# Patient Record
Sex: FEMALE | Race: WHITE | NOT HISPANIC OR LATINO | Employment: OTHER | URBAN - METROPOLITAN AREA
[De-identification: names, ages, dates, MRNs, and addresses within clinical notes are randomized per-mention and may not be internally consistent; named-entity substitution may affect disease eponyms.]

---

## 2017-01-19 ENCOUNTER — ALLSCRIPTS OFFICE VISIT (OUTPATIENT)
Dept: OTHER | Facility: OTHER | Age: 42
End: 2017-01-19

## 2017-01-19 DIAGNOSIS — Z12.31 ENCOUNTER FOR SCREENING MAMMOGRAM FOR MALIGNANT NEOPLASM OF BREAST: ICD-10-CM

## 2017-01-19 DIAGNOSIS — O24.419 GESTATIONAL DIABETES MELLITUS IN PREGNANCY: ICD-10-CM

## 2017-01-19 DIAGNOSIS — E66.9 OBESITY: ICD-10-CM

## 2017-01-24 ENCOUNTER — APPOINTMENT (OUTPATIENT)
Dept: LAB | Facility: HOSPITAL | Age: 42
End: 2017-01-24
Payer: COMMERCIAL

## 2017-01-24 ENCOUNTER — TRANSCRIBE ORDERS (OUTPATIENT)
Dept: LAB | Facility: HOSPITAL | Age: 42
End: 2017-01-24

## 2017-01-24 DIAGNOSIS — O24.419 GESTATIONAL DIABETES MELLITUS IN PREGNANCY: ICD-10-CM

## 2017-01-24 DIAGNOSIS — E66.9 OBESITY: ICD-10-CM

## 2017-01-24 LAB
ALBUMIN SERPL BCP-MCNC: 3.3 G/DL (ref 3.5–5)
ALP SERPL-CCNC: 72 U/L (ref 46–116)
ALT SERPL W P-5'-P-CCNC: 21 U/L (ref 12–78)
ANION GAP SERPL CALCULATED.3IONS-SCNC: 6 MMOL/L (ref 4–13)
AST SERPL W P-5'-P-CCNC: 25 U/L (ref 5–45)
BILIRUB SERPL-MCNC: 0.36 MG/DL (ref 0.2–1)
BUN SERPL-MCNC: 10 MG/DL (ref 5–25)
CALCIUM SERPL-MCNC: 9 MG/DL (ref 8.3–10.1)
CHLORIDE SERPL-SCNC: 105 MMOL/L (ref 100–108)
CHOLEST SERPL-MCNC: 208 MG/DL (ref 50–200)
CO2 SERPL-SCNC: 28 MMOL/L (ref 21–32)
CREAT SERPL-MCNC: 0.63 MG/DL (ref 0.6–1.3)
EST. AVERAGE GLUCOSE BLD GHB EST-MCNC: 103 MG/DL
GFR SERPL CREATININE-BSD FRML MDRD: >60 ML/MIN/1.73SQ M
GLUCOSE SERPL-MCNC: 85 MG/DL (ref 65–140)
HBA1C MFR BLD: 5.2 % (ref 4.2–6.3)
HDLC SERPL-MCNC: 54 MG/DL (ref 40–60)
LDLC SERPL CALC-MCNC: 138 MG/DL (ref 0–100)
POTASSIUM SERPL-SCNC: 4.5 MMOL/L (ref 3.5–5.3)
PROT SERPL-MCNC: 7.5 G/DL (ref 6.4–8.2)
SODIUM SERPL-SCNC: 139 MMOL/L (ref 136–145)
TRIGL SERPL-MCNC: 81 MG/DL

## 2017-01-24 PROCEDURE — 80053 COMPREHEN METABOLIC PANEL: CPT

## 2017-01-24 PROCEDURE — 36415 COLL VENOUS BLD VENIPUNCTURE: CPT

## 2017-01-24 PROCEDURE — 80061 LIPID PANEL: CPT

## 2017-01-24 PROCEDURE — 83036 HEMOGLOBIN GLYCOSYLATED A1C: CPT

## 2017-01-29 ENCOUNTER — HOSPITAL ENCOUNTER (EMERGENCY)
Facility: HOSPITAL | Age: 42
Discharge: HOME | End: 2017-01-29
Attending: EMERGENCY MEDICINE | Admitting: EMERGENCY MEDICINE
Payer: COMMERCIAL

## 2017-01-29 VITALS
OXYGEN SATURATION: 98 % | WEIGHT: 220 LBS | RESPIRATION RATE: 18 BRPM | TEMPERATURE: 97 F | DIASTOLIC BLOOD PRESSURE: 71 MMHG | SYSTOLIC BLOOD PRESSURE: 158 MMHG | BODY MASS INDEX: 38.98 KG/M2 | HEIGHT: 63 IN | HEART RATE: 97 BPM

## 2017-01-29 DIAGNOSIS — L29.8 PRURITIC ERYTHEMATOUS RASH: Primary | ICD-10-CM

## 2017-01-29 PROCEDURE — 99282 EMERGENCY DEPT VISIT SF MDM: CPT

## 2017-03-07 ENCOUNTER — HOSPITAL ENCOUNTER (OUTPATIENT)
Dept: RADIOLOGY | Facility: HOSPITAL | Age: 42
Discharge: HOME | End: 2017-03-07
Payer: COMMERCIAL

## 2017-03-07 DIAGNOSIS — Z12.31 ENCOUNTER FOR SCREENING MAMMOGRAM FOR MALIGNANT NEOPLASM OF BREAST: ICD-10-CM

## 2017-03-07 PROCEDURE — G0202 SCR MAMMO BI INCL CAD: HCPCS

## 2017-05-11 ENCOUNTER — LAB REQUISITION (OUTPATIENT)
Dept: LAB | Facility: HOSPITAL | Age: 42
End: 2017-05-11
Payer: COMMERCIAL

## 2017-05-11 ENCOUNTER — ALLSCRIPTS OFFICE VISIT (OUTPATIENT)
Dept: OTHER | Facility: OTHER | Age: 42
End: 2017-05-11

## 2017-05-11 DIAGNOSIS — N92.6 IRREGULAR MENSTRUATION: ICD-10-CM

## 2017-05-11 DIAGNOSIS — Z01.419 ENCOUNTER FOR GYNECOLOGICAL EXAMINATION WITHOUT ABNORMAL FINDING: ICD-10-CM

## 2017-05-11 PROCEDURE — G0143 SCR C/V CYTO,THINLAYER,RESCR: HCPCS | Performed by: PHYSICIAN ASSISTANT

## 2017-05-16 ENCOUNTER — ALLSCRIPTS OFFICE VISIT (OUTPATIENT)
Dept: OTHER | Facility: OTHER | Age: 42
End: 2017-05-16

## 2017-05-17 ENCOUNTER — ALLSCRIPTS OFFICE VISIT (OUTPATIENT)
Dept: OTHER | Facility: OTHER | Age: 42
End: 2017-05-17

## 2017-05-17 ENCOUNTER — LAB REQUISITION (OUTPATIENT)
Dept: LAB | Facility: HOSPITAL | Age: 42
End: 2017-05-17
Payer: COMMERCIAL

## 2017-05-17 DIAGNOSIS — N90.89 OTHER SPECIFIED NONINFLAMMATORY DISORDER OF VULVA AND PERINEUM: ICD-10-CM

## 2017-05-17 PROCEDURE — 88305 TISSUE EXAM BY PATHOLOGIST: CPT | Performed by: PHYSICIAN ASSISTANT

## 2017-05-18 LAB
LAB AP GYN PRIMARY INTERPRETATION: NORMAL
LAB AP LMP: NORMAL
Lab: NORMAL
PATH INTERP SPEC-IMP: NORMAL

## 2017-05-19 ENCOUNTER — GENERIC CONVERSION - ENCOUNTER (OUTPATIENT)
Dept: OTHER | Facility: OTHER | Age: 42
End: 2017-05-19

## 2017-05-25 ENCOUNTER — GENERIC CONVERSION - ENCOUNTER (OUTPATIENT)
Dept: OTHER | Facility: OTHER | Age: 42
End: 2017-05-25

## 2017-08-02 ENCOUNTER — ALLSCRIPTS OFFICE VISIT (OUTPATIENT)
Dept: OTHER | Facility: OTHER | Age: 42
End: 2017-08-02

## 2017-10-19 ENCOUNTER — GENERIC CONVERSION - ENCOUNTER (OUTPATIENT)
Dept: OTHER | Facility: OTHER | Age: 42
End: 2017-10-19

## 2017-10-19 DIAGNOSIS — M79.672 PAIN OF LEFT FOOT: ICD-10-CM

## 2017-10-23 ENCOUNTER — ALLSCRIPTS OFFICE VISIT (OUTPATIENT)
Dept: OTHER | Facility: OTHER | Age: 42
End: 2017-10-23

## 2017-10-24 NOTE — PROGRESS NOTES
Assessment  1  Breast cancer screening, high risk patient (V76 11) (Z12 31)    Plan  Breast cancer screening, high risk patient    · * MRI BREAST BILATERAL W OR WO CONTRAST; Status:Need Information - Financial  Authorization; Requested ALQ:[id number];    Perform:[facility] Radiology; GYW:[id number]; Ordered; For:Breast cancer screening, high risk patient; Ordered By:Celia Dempsey;   · Follow-up PRN Evaluation and Treatment  Follow-up  Status: Complete  Done:  [id number]   Ordered; For: Breast cancer screening, high risk patient; Ordered By: Ac Holly Performed:  Due: 16KSH1581   · Follow-up visit in 5 months Evaluation and Treatment  Follow-up  Status: Hold For -  Scheduling  Requested for: [id number]   Ordered; For: Breast cancer screening, high risk patient; Ordered By: Ac Holly Performed:  Due: 73IQK9083    Discussion/Summary  Discussion Summary:   Negative BRCA mutation result  Breast cancer risk score of 32 0%  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  GIven provided packet of information to complete and return to the office asap to be return to the [employer]  Discussed elevated risk associated with breast cancer risk score which is based on family, personal and breast cancer markers  Continue with annual mammogram and monthly BSE  Start annual breast MRI with recommendation of rotation of MRI then mammo 6 months later  Negative mutation result means a breast cancer will not be the result of this gene mutation  Breast cancer risk is no less than that of your personal and family history  Share result information with PCP  Counseling Documentation With Imm: The patient was counseled regarding instructions for management,-- risk factor reductions,-- patient and family education,-- risks and benefits of treatment options,-- importance of compliance with treatment  total time of encounter was 20 minutes-- and-- 18 minutes was spent counseling        Chief Complaint  Chief Complaint Free Text Note Form: Pt here for [facility] results      History of Present Illness  HPI: Patient here for BRCA results  Referred from [facility]  Review of Systems  Focused-Female:   Constitutional: No fever, no chills, feels well, no tiredness, no recent weight gain or loss  Breasts: no complaints of breast pain, breast lump or nipple discharge  ROS Reviewed:   ROS reviewed  Active Problems  1  Bacterial vaginosis (616 10,041 9) (N76 0,B96 89)   2  Bipolar disorder (296 80) (F31 9)   3  Encounter for routine gynecological examination (V72 31) (Z01 419)   4  Genetic testing (V82 79) (Z13 79)   5  Gestational diabetes (648 80) (O24 419)   6  Heel pain, chronic, left (729 5,338 29) (M79 672,G89 29)   7  Lichen sclerosus (202 9) (L90 0)   8  Need for influenza vaccination (V04 81) (Z23)   9  Need for Tdap vaccination (V06 1) (Z23)   10  Obesity (278 00) (E66 9)   11  Pap smear, as part of routine gynecological examination (V76 2) (Z01 419)   12  Screening for colon cancer (V76 51) (Z12 11)   13  Screening for colon cancer (V76 51) (Z12 11)   14  Visit for gynecologic examination (V72 31) (Z01 419)   15  Visit for screening mammogram (V76 12) (Z12 31)   16  Vulvar irritation (624 8) (N90 89)    Past Medical History  1  History of Age At First Period 15 Years Old (Menarche)   2  History of Ankle weakness (719 67) (M62 81)   3  History of Asthma (493 90) (J45 909)   4  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) (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 4)   5  History of Depression (311) (F32 9)   6  History of Diastasis recti (728 84) (M62 08)   7  History of Female pelvic pain (625 9) (R10 2)   8  History Of 5  Previous Pregnancies (V61 5)   9  History of anemia (V12 3) (Z86 2)   10  History of bipolar disorder (V11 1) (Z86 59)   11  History of chlamydia infection (V12 09) (Z86 19)   12  History of low back pain (V13 59) (Z87 39)   13  History of menstrual disorder (V13 29) (Z87 42)   14  History of migraine (V12 49) (Z86 69)   15   History of Polyuria (788 42) (R35 8)   16  History of Post traumatic stress disorder (309 81) (F43 10)  Active Problems And Past Medical History Reviewed: The active problems and past medical history were reviewed and updated today  Surgical History  1  History of Breast Surgery Lumpectomy   2  History of  Section   3  History of Cholecystectomy Laparoscopic   4  History of Laparoscopy With Excision Of Ectopic Pregnancy   5  History of Tubal Ligation  Surgical History Reviewed: The surgical history was reviewed and updated today  Family History  Mother    1  Family history of Current Diet Is High In Cholesterol   2  Family history of Diabetes Mellitus (V18 0)   3  Family history of Hyperlipidemia   4  Family history of Hypertension (V17 49)  Father    5  Family history of Alcoholism   6  Family history of Drug abuse   7  Family history of Incarceration  Sister    6  Family history of colon cancer (V16 0) (Z80 0)  Maternal Grandmother    9  Family history of Leukemia (V16 6)  Maternal Aunt    10  Family history of breast cancer (V16 3) (Z80 3)  Family History    11  Family history of Ectopic Pregnancy   12  Family history of Uterine Cancer (V16 49)  Family History Reviewed: The family history was reviewed and updated today  Social History   · Child of  household (V61 06) (Z63 8)   · Disability   · Denied: History of Drug Use   · Four children   · Never A Smoker   · Occasional alcohol use   · Single  Social History Reviewed: The social history was reviewed and updated today  Current Meds   1  Naproxen 500 MG Oral Tablet; TAKE 1 TABLET EVERY 12 HOURS WITH FOOD AS   NEEDED; Therapy: 10AIW7598 to (Evaluate:2017)  Requested for: 2017; Last   Rx:2017 Ordered    Allergies  1  No Known Drug Allergies  2  No Known Food Allergies   3   Pollen    Vitals  Vital Signs    Recorded: 74QZX0964 61:55EK   Systolic 411, RUE, Sitting   Diastolic 68, RUE, Sitting   Height 5 ft 4 5 in   Weight 232 lb    BMI Calculated 39 21   BSA Calculated 2 1   LMP 23Jun2017     Physical Exam  deferred        Signatures   Electronically signed by :  RIAZ Sherwood; Oct 23 2017 11:14AM EST                       (Author)    Electronically signed by : DOT Ma ; Oct 23 2017  7:14PM EST                       (Author)

## 2018-01-12 VITALS
HEIGHT: 65 IN | BODY MASS INDEX: 38.65 KG/M2 | WEIGHT: 232 LBS | DIASTOLIC BLOOD PRESSURE: 68 MMHG | SYSTOLIC BLOOD PRESSURE: 104 MMHG

## 2018-01-12 VITALS
BODY MASS INDEX: 37.15 KG/M2 | HEART RATE: 80 BPM | WEIGHT: 223 LBS | DIASTOLIC BLOOD PRESSURE: 72 MMHG | HEIGHT: 65 IN | TEMPERATURE: 98.6 F | SYSTOLIC BLOOD PRESSURE: 104 MMHG

## 2018-01-13 VITALS
SYSTOLIC BLOOD PRESSURE: 110 MMHG | BODY MASS INDEX: 36.82 KG/M2 | HEIGHT: 65 IN | WEIGHT: 221 LBS | DIASTOLIC BLOOD PRESSURE: 70 MMHG

## 2018-01-14 VITALS
WEIGHT: 225 LBS | HEIGHT: 65 IN | DIASTOLIC BLOOD PRESSURE: 64 MMHG | SYSTOLIC BLOOD PRESSURE: 108 MMHG | BODY MASS INDEX: 37.49 KG/M2

## 2018-01-14 VITALS
SYSTOLIC BLOOD PRESSURE: 120 MMHG | DIASTOLIC BLOOD PRESSURE: 76 MMHG | BODY MASS INDEX: 37.07 KG/M2 | WEIGHT: 222.5 LBS | HEIGHT: 65 IN

## 2018-01-15 NOTE — MISCELLANEOUS
Message  Spoke with patient regarding PAP results  Negative cytology, but showed BV infection  Patient complains of itching, but no discharge or odor  Rx for metronidazole 500 mg BID x 5 days sent to pharmacy  Instructed patient to avoid alcohol while on abx  We will call with results of vulvar biopsy        Signatures   Electronically signed by : Austen Babin, [facility]; May 19 2017 11:17AM EST                       (Author)

## 2018-01-15 NOTE — MISCELLANEOUS
Message  Spoke with patient regarding vulvar biopsy results  Pathology showed lichen sclerosus  Negative for lesion or malignancy  Rx for Topicort cream sent to pharmacy  Patient to use externally sparingly twice a day  Also discussed genetic testing  Explained [employer]'s financial hardship policy  Patient desires testing  F/u in 8 wks for vulvar check and genetic testing        Signatures   Electronically signed by : Colonel Frost [facility]; May 25 2017  4:16PM EST                       (Author)

## 2018-01-22 ENCOUNTER — APPOINTMENT (OUTPATIENT)
Dept: LAB | Facility: CLINIC | Age: 43
End: 2018-01-22
Payer: COMMERCIAL

## 2018-01-22 VITALS
HEIGHT: 65 IN | BODY MASS INDEX: 38.64 KG/M2 | HEART RATE: 72 BPM | SYSTOLIC BLOOD PRESSURE: 96 MMHG | TEMPERATURE: 98.5 F | DIASTOLIC BLOOD PRESSURE: 70 MMHG | WEIGHT: 231.92 LBS

## 2018-01-22 DIAGNOSIS — E66.9 OBESITY: ICD-10-CM

## 2018-01-22 DIAGNOSIS — O24.419 GESTATIONAL DIABETES MELLITUS IN PREGNANCY: ICD-10-CM

## 2018-01-22 LAB
ALBUMIN SERPL BCP-MCNC: 3.5 G/DL (ref 3.5–5)
ALP SERPL-CCNC: 66 U/L (ref 46–116)
ALT SERPL W P-5'-P-CCNC: 19 U/L (ref 12–78)
ANION GAP SERPL CALCULATED.3IONS-SCNC: 8 MMOL/L (ref 4–13)
AST SERPL W P-5'-P-CCNC: 15 U/L (ref 5–45)
BILIRUB SERPL-MCNC: 0.5 MG/DL (ref 0.2–1)
BUN SERPL-MCNC: 11 MG/DL (ref 5–25)
CALCIUM SERPL-MCNC: 8.4 MG/DL (ref 8.3–10.1)
CHLORIDE SERPL-SCNC: 105 MMOL/L (ref 100–108)
CHOLEST SERPL-MCNC: 189 MG/DL (ref 50–200)
CO2 SERPL-SCNC: 22 MMOL/L (ref 21–32)
CREAT SERPL-MCNC: 0.64 MG/DL (ref 0.6–1.3)
EST. AVERAGE GLUCOSE BLD GHB EST-MCNC: 105 MG/DL
GFR SERPL CREATININE-BSD FRML MDRD: 110 ML/MIN/1.73SQ M
GLUCOSE P FAST SERPL-MCNC: 87 MG/DL (ref 65–99)
HBA1C MFR BLD: 5.3 % (ref 4.2–6.3)
HDLC SERPL-MCNC: 50 MG/DL (ref 40–60)
LDLC SERPL CALC-MCNC: 122 MG/DL (ref 0–100)
POTASSIUM SERPL-SCNC: 4.2 MMOL/L (ref 3.5–5.3)
PROT SERPL-MCNC: 7.8 G/DL (ref 6.4–8.2)
SODIUM SERPL-SCNC: 135 MMOL/L (ref 136–145)
TRIGL SERPL-MCNC: 86 MG/DL

## 2018-01-22 PROCEDURE — 80053 COMPREHEN METABOLIC PANEL: CPT

## 2018-01-22 PROCEDURE — 36415 COLL VENOUS BLD VENIPUNCTURE: CPT

## 2018-01-22 PROCEDURE — 83036 HEMOGLOBIN GLYCOSYLATED A1C: CPT

## 2018-01-22 PROCEDURE — 80061 LIPID PANEL: CPT

## 2018-02-13 PROBLEM — Z12.11 COLON CANCER SCREENING: Status: ACTIVE | Noted: 2018-02-13

## 2018-02-16 ENCOUNTER — TELEPHONE (OUTPATIENT)
Dept: INTERNAL MEDICINE CLINIC | Facility: CLINIC | Age: 43
End: 2018-02-16

## 2018-02-16 DIAGNOSIS — Z12.11 COLON CANCER SCREENING: Primary | ICD-10-CM

## 2018-02-16 PROBLEM — L90.0 LICHEN SCLEROSUS: Status: ACTIVE | Noted: 2017-05-25

## 2018-02-16 RX ORDER — POLYETHYLENE GLYCOL 3350 17 G/17G
POWDER, FOR SOLUTION ORAL
Qty: 500 G | Refills: 0 | Status: SHIPPED | OUTPATIENT
Start: 2018-02-16 | End: 2018-12-28 | Stop reason: ALTCHOICE

## 2018-02-16 NOTE — TELEPHONE ENCOUNTER
This patient is scheduled on 03/16/18 for their Colonoscopy/EGD procedure  Please send prep to the pharmacy  I spoke with the patient and made him/her aware of their appointment date, I mailed out the instructions  According to the referral order form, this patient does not need to hold any meds

## 2018-03-01 DIAGNOSIS — Z12.31 ENCOUNTER FOR SCREENING MAMMOGRAM FOR MALIGNANT NEOPLASM OF BREAST: ICD-10-CM

## 2018-12-21 RX ORDER — NAPROXEN 500 MG/1
1 TABLET ORAL EVERY 12 HOURS
COMMUNITY
Start: 2017-10-19 | End: 2019-08-19 | Stop reason: ALTCHOICE

## 2018-12-28 ENCOUNTER — TELEPHONE (OUTPATIENT)
Dept: GASTROENTEROLOGY | Facility: CLINIC | Age: 43
End: 2018-12-28

## 2018-12-28 ENCOUNTER — OFFICE VISIT (OUTPATIENT)
Dept: INTERNAL MEDICINE CLINIC | Facility: CLINIC | Age: 43
End: 2018-12-28
Payer: COMMERCIAL

## 2018-12-28 VITALS
HEIGHT: 65 IN | SYSTOLIC BLOOD PRESSURE: 110 MMHG | DIASTOLIC BLOOD PRESSURE: 80 MMHG | TEMPERATURE: 97.8 F | WEIGHT: 228.4 LBS | HEART RATE: 72 BPM | BODY MASS INDEX: 38.05 KG/M2

## 2018-12-28 DIAGNOSIS — R40.0 DAYTIME SOMNOLENCE: ICD-10-CM

## 2018-12-28 DIAGNOSIS — F31.9 BIPOLAR AFFECTIVE DISORDER, REMISSION STATUS UNSPECIFIED (HCC): ICD-10-CM

## 2018-12-28 DIAGNOSIS — E66.09 CLASS 2 OBESITY DUE TO EXCESS CALORIES WITHOUT SERIOUS COMORBIDITY WITH BODY MASS INDEX (BMI) OF 38.0 TO 38.9 IN ADULT: ICD-10-CM

## 2018-12-28 DIAGNOSIS — R06.83 SNORING: ICD-10-CM

## 2018-12-28 DIAGNOSIS — Z80.0 FAMILY HISTORY OF COLON CANCER: ICD-10-CM

## 2018-12-28 DIAGNOSIS — G43.109 MIGRAINE WITH AURA AND WITHOUT STATUS MIGRAINOSUS, NOT INTRACTABLE: Primary | ICD-10-CM

## 2018-12-28 DIAGNOSIS — Z12.11 COLON CANCER SCREENING: ICD-10-CM

## 2018-12-28 PROBLEM — E66.9 OBESITY (BMI 35.0-39.9 WITHOUT COMORBIDITY): Status: ACTIVE | Noted: 2018-12-28

## 2018-12-28 PROCEDURE — 99214 OFFICE O/P EST MOD 30 MIN: CPT | Performed by: PHYSICIAN ASSISTANT

## 2018-12-28 PROCEDURE — 3725F SCREEN DEPRESSION PERFORMED: CPT | Performed by: PHYSICIAN ASSISTANT

## 2018-12-28 PROCEDURE — 1036F TOBACCO NON-USER: CPT | Performed by: PHYSICIAN ASSISTANT

## 2018-12-28 RX ORDER — SUMATRIPTAN 50 MG/1
TABLET, FILM COATED ORAL
Qty: 8 TABLET | Refills: 1 | Status: SHIPPED | OUTPATIENT
Start: 2018-12-28 | End: 2019-08-19 | Stop reason: ALTCHOICE

## 2018-12-28 NOTE — ASSESSMENT & PLAN NOTE
As reviewed today you are aware that it is recommended that you resume therapy with her mental health providers  You plan to call them to reschedule and get back in under care

## 2018-12-28 NOTE — PROGRESS NOTES
Assessment/Plan:    Migraine with aura and without status migrainosus, not intractable  As we reviewed with you today we are starting you on medications for your migraines  Please take the magnesium oxide 1 tablet daily  The sumatriptan is to help stop headache if it starts  Please take 1 tablet when you see the white spots or the onset of the headache  You may take a 2nd pill if the headache is not resolved in 2 hr  As review the limit is 2 pills in 24 hr and no more than 8 pills in 1 month  Please also keep a headache diary as far as activity in foods as there are a number of possible triggers contributing to this  Daytime somnolence  Please call and schedule the sleep consultation and evaluation  Based on symptoms of snoring and being told you have gasped for air in the middle of the night by her spouse it is important that you get this evaluated  If you have sleep apnea this can contribute to mood, tiredness, headaches weight as well as health issues in the future including heart problems  Family history of colon cancer  Please call and reschedule your colonoscopy  We reviewed that you canceled this back in March of 2018 secondary to being on well at the time  You are aware that this is very important due to positive family history of colon cancer  Bipolar disorder [facility])  As reviewed today you are aware that it is recommended that you resume therapy with her mental health providers  You plan to call them to reschedule and get back in under care  Class 2 obesity due to excess calories without serious comorbidity with body mass index (BMI) of 38 0 to 38 9 in adult  As we discussed today improving diet and daily exercise will help improve your weight, sleeping and mood  Walking at least 30 min at least 5 days per week will help with this  As reviewed please avoid fast foods  Please monitor portion sizes as this significantly contribute to weight gain         Diagnoses and all orders for this visit:    Migraine with aura and without status migrainosus, not intractable  -     magnesium oxide (MAG-OX) 400 mg; Take 1 tablet (400 mg total) by mouth daily for 90 days  -     SUMAtriptan (IMITREX) 50 mg tablet; Take 1 tablet with migraine  If not resolved in 2 hr may take a 2nd tablet  Limit of 2 tablets per 24 hr and 8 tablets/month    Daytime somnolence  -     Ambulatory referral to Sleep Medicine; Future    Family history of colon cancer    Bipolar affective disorder, remission status unspecified ([facility] )    Class 2 obesity due to excess calories without serious comorbidity with body mass index (BMI) of 38 0 to 38 9 in adult    Colon cancer screening  -     Ambulatory referral to Gastroenterology; Future    Snoring  -     Ambulatory referral to Sleep Medicine; Future          Subjective:      Patient ID: Yaneth Nina is a 37 y o  female  Pt here as was told by insurance to come in but not due for annual until Feb 2019  Overall feeling well only new concern is sometimes getting headaches  States started 2 weeks ago, getting 2-3X a week  States will be temporal area and can be R or L, feels a throbbing sensation  Will last 20 minutes to about 1 hour may get more than once a day  No N/V   Pos Phono / Photophobia  Closing eyes does help a bit  No dizzy or light headed  States often will have some white spots in vision prior to onset of pain  No known FH migraines    Patient admits she feels she has been under slightly more stress  Nothing specific just some financial struggles through the holidays wanting to provide everything she can for her children  Patient reports she is not in any financial urgency just [place] for her children  Patient does admit she has not been sleeping as well  When reviewed patient confirm she has been told she snores a lot and in fact her  has reported that at times she has gasped for air    Patient admits she has been tired and feels like she wants to nap during the day but tries to avoid this so she can sleep at night  Patient drinks minimal caffeine and has not had any change in this over the past 6 months  Patient has not noticed any particular triggers however it is only been 2 weeks  Patient also confirm she has not been following with her mental health providers with the diagnosis of bipolar  Patient states she recently had her social security evaluation and was advised that it was better if she remained under care  Patient reports she stopped going is did not like the medications and the last med she was on was lithium which made her feel unwell  Discussed with patient that if her diagnosis is truly bipolar then she really does need to remain under the care of mental health providers  She is encouraged to do so and to have a on this discussion with them regarding medications and side effects  Patient confirms positive family history of bipolar in her father  Patient also confirms that her colonoscopy was canceled by herself  When she received the call as reminder she advised them she was sick so the colonoscopy was canceled and she never recalled to reschedule  Advised patient I was providing a new referral for GI for colonoscopy secondary to positive family history with her sister having colon cancer  Patient states she understands the importance and will call to get this rescheduled  Migraine    This is a recurrent problem  The current episode started 1 to 4 weeks ago  The problem occurs intermittently  The problem has been gradually worsening  The pain is located in the bilateral and temporal region  The pain does not radiate  The pain quality is not similar to prior headaches  The quality of the pain is described as throbbing and stabbing  The pain is at a severity of 8/10  The pain is moderate  Associated symptoms include phonophobia, photophobia and a visual change (as noted only prior to H/A white spots, no blurry vision)  Pertinent negatives include no abdominal pain, blurred vision, coughing, dizziness, eye pain, eye redness, fever, loss of balance, nausea, numbness, scalp tenderness, vomiting or weakness  The symptoms are aggravated by bright light and fatigue  Treatments tried: excedrin for migraine, feels sometimes helps  The treatment provided moderate relief  The following portions of the patient's history were reviewed and updated as appropriate: allergies, current medications, past family history, past medical history, past social history, past surgical history and problem list     Review of Systems   Constitutional: Negative  Negative for chills, fever and unexpected weight change  HENT: Negative  Eyes: Positive for photophobia  Negative for blurred vision, pain and redness  Respiratory: Negative  Negative for cough and shortness of breath  Cardiovascular: Negative  Negative for chest pain and palpitations  Gastrointestinal: Negative for abdominal pain, nausea and vomiting  Genitourinary: Negative  Musculoskeletal: Negative  Skin: Negative  Neurological: Positive for headaches  Negative for dizziness, weakness, numbness and loss of balance  Hematological: Negative  Objective:      /80 (BP Location: Left arm, Patient Position: Sitting, Cuff Size: Large)   Pulse 72   Temp 97 8 °F (36 6 °C) (Oral)   Ht 5' 4 5" (1 638 m)   Wt 104 kg (228 lb 6 3 oz)   BMI 38 60 kg/m²          Physical Exam   Constitutional: She is oriented to person, place, and time  She appears well-developed and well-nourished  HENT:   Head: Normocephalic and atraumatic  Right Ear: External ear normal    Mouth/Throat: Oropharynx is clear and moist    Eyes: Pupils are equal, round, and reactive to light  Conjunctivae are normal    Neck: Neck supple  No thyromegaly present  Cardiovascular: Normal rate, regular rhythm and normal heart sounds  No murmur heard    Pulmonary/Chest: Effort normal and breath sounds normal  She has no wheezes  Abdominal: Soft  Bowel sounds are normal  There is no tenderness  Musculoskeletal: She exhibits no edema  Neurological: She is alert and oriented to person, place, and time  No cranial nerve deficit  Skin: Skin is warm and dry  No rash noted  Psychiatric: She has a normal mood and affect   Her behavior is normal  Judgment and thought content normal

## 2018-12-28 NOTE — ASSESSMENT & PLAN NOTE
Please call and reschedule your colonoscopy  We reviewed that you canceled this back in March of 2018 secondary to being on well at the time  You are aware that this is very important due to positive family history of colon cancer

## 2018-12-28 NOTE — ASSESSMENT & PLAN NOTE
As we reviewed with you today we are starting you on medications for your migraines  Please take the magnesium oxide 1 tablet daily  The sumatriptan is to help stop headache if it starts  Please take 1 tablet when you see the white spots or the onset of the headache  You may take a 2nd pill if the headache is not resolved in 2 hr  As review the limit is 2 pills in 24 hr and no more than 8 pills in 1 month  Please also keep a headache diary as far as activity in foods as there are a number of possible triggers contributing to this

## 2018-12-28 NOTE — ASSESSMENT & PLAN NOTE
Please call and schedule the sleep consultation and evaluation  Based on symptoms of snoring and being told you have gasped for air in the middle of the night by her spouse it is important that you get this evaluated  If you have sleep apnea this can contribute to mood, tiredness, headaches weight as well as health issues in the future including heart problems

## 2018-12-28 NOTE — PATIENT INSTRUCTIONS
As discussed please start the medications for migraine  You are aware to take the sumatriptan with the onset of your aura which is the white spots in vision  If headache not resolved in 2 hr you may take a 2nd tablet  No more than 2 tablets in 24 hr and no more than 8 tablets per month  Please also keep a headache diary as this may help identify triggers to migraines  Please schedule your sleep consultation and evaluation as this to can contribute to chronic headaches  Please also continue to work to decrease weight as this can also help with your Sleep and breathing  I have also reordered a referral to GI as you still need to get your colonoscopy with positive family history of colon cancer

## 2018-12-28 NOTE — TELEPHONE ENCOUNTER
Colon scheduled 2/19/19 at [facility] with Obie Peñax/Dulcolax instructions mailed to patient's home as that was the prep originally given to her in March

## 2018-12-28 NOTE — ASSESSMENT & PLAN NOTE
As we discussed today improving diet and daily exercise will help improve your weight, sleeping and mood  Walking at least 30 min at least 5 days per week will help with this  As reviewed please avoid fast foods  Please monitor portion sizes as this significantly contribute to weight gain

## 2019-02-14 ENCOUNTER — TELEPHONE (OUTPATIENT)
Dept: GASTROENTEROLOGY | Facility: CLINIC | Age: 44
End: 2019-02-14

## 2019-05-22 ENCOUNTER — ANESTHESIA EVENT (OUTPATIENT)
Dept: GASTROENTEROLOGY | Facility: HOSPITAL | Age: 44
End: 2019-05-22

## 2019-05-22 RX ORDER — SODIUM CHLORIDE 9 MG/ML
125 INJECTION, SOLUTION INTRAVENOUS CONTINUOUS
Status: CANCELLED | OUTPATIENT
Start: 2019-05-22

## 2019-05-22 RX ORDER — LIDOCAINE HYDROCHLORIDE 10 MG/ML
0.5 INJECTION, SOLUTION EPIDURAL; INFILTRATION; INTRACAUDAL; PERINEURAL ONCE AS NEEDED
Status: CANCELLED | OUTPATIENT
Start: 2019-05-22

## 2019-05-23 ENCOUNTER — HOSPITAL ENCOUNTER (OUTPATIENT)
Dept: GASTROENTEROLOGY | Facility: HOSPITAL | Age: 44
Setting detail: OUTPATIENT SURGERY
Discharge: HOME | End: 2019-05-23

## 2019-05-23 ENCOUNTER — ANESTHESIA (OUTPATIENT)
Dept: GASTROENTEROLOGY | Facility: HOSPITAL | Age: 44
End: 2019-05-23

## 2019-06-06 ENCOUNTER — APPOINTMENT (OUTPATIENT)
Dept: LAB | Facility: CLINIC | Age: 44
End: 2019-06-06
Payer: MEDICARE

## 2019-06-06 DIAGNOSIS — F31.32 MODERATE DEPRESSED BIPOLAR I DISORDER (HCC): ICD-10-CM

## 2019-06-06 DIAGNOSIS — Z79.899 ENCOUNTER FOR LONG-TERM (CURRENT) USE OF OTHER MEDICATIONS: Primary | ICD-10-CM

## 2019-06-06 LAB
25(OH)D3 SERPL-MCNC: 12.9 NG/ML (ref 30–100)
ALBUMIN SERPL BCP-MCNC: 3.7 G/DL (ref 3.5–5)
ALP SERPL-CCNC: 66 U/L (ref 46–116)
ALT SERPL W P-5'-P-CCNC: 20 U/L (ref 12–78)
ANION GAP SERPL CALCULATED.3IONS-SCNC: 5 MMOL/L (ref 4–13)
AST SERPL W P-5'-P-CCNC: 12 U/L (ref 5–45)
BASOPHILS # BLD AUTO: 0.04 THOUSANDS/ΜL (ref 0–0.1)
BASOPHILS NFR BLD AUTO: 1 % (ref 0–1)
BILIRUB SERPL-MCNC: 0.41 MG/DL (ref 0.2–1)
BUN SERPL-MCNC: 8 MG/DL (ref 5–25)
CALCIUM SERPL-MCNC: 8.2 MG/DL (ref 8.3–10.1)
CHLORIDE SERPL-SCNC: 107 MMOL/L (ref 100–108)
CHOLEST SERPL-MCNC: 166 MG/DL (ref 50–200)
CO2 SERPL-SCNC: 26 MMOL/L (ref 21–32)
CREAT SERPL-MCNC: 0.65 MG/DL (ref 0.6–1.3)
EOSINOPHIL # BLD AUTO: 0.1 THOUSAND/ΜL (ref 0–0.61)
EOSINOPHIL NFR BLD AUTO: 1 % (ref 0–6)
ERYTHROCYTE [DISTWIDTH] IN BLOOD BY AUTOMATED COUNT: 13.4 % (ref 11.6–15.1)
FOLATE SERPL-MCNC: 7.5 NG/ML (ref 3.1–17.5)
GFR SERPL CREATININE-BSD FRML MDRD: 109 ML/MIN/1.73SQ M
GLUCOSE P FAST SERPL-MCNC: 84 MG/DL (ref 65–99)
GLUCOSE P FAST SERPL-MCNC: 85 MG/DL (ref 65–99)
HCT VFR BLD AUTO: 41.4 % (ref 34.8–46.1)
HDLC SERPL-MCNC: 43 MG/DL (ref 40–60)
HGB BLD-MCNC: 13.1 G/DL (ref 11.5–15.4)
IMM GRANULOCYTES # BLD AUTO: 0.02 THOUSAND/UL (ref 0–0.2)
IMM GRANULOCYTES NFR BLD AUTO: 0 % (ref 0–2)
LDLC SERPL CALC-MCNC: 107 MG/DL (ref 0–100)
LYMPHOCYTES # BLD AUTO: 2.76 THOUSANDS/ΜL (ref 0.6–4.47)
LYMPHOCYTES NFR BLD AUTO: 36 % (ref 14–44)
MCH RBC QN AUTO: 27.1 PG (ref 26.8–34.3)
MCHC RBC AUTO-ENTMCNC: 31.6 G/DL (ref 31.4–37.4)
MCV RBC AUTO: 86 FL (ref 82–98)
MONOCYTES # BLD AUTO: 0.41 THOUSAND/ΜL (ref 0.17–1.22)
MONOCYTES NFR BLD AUTO: 5 % (ref 4–12)
NEUTROPHILS # BLD AUTO: 4.42 THOUSANDS/ΜL (ref 1.85–7.62)
NEUTS SEG NFR BLD AUTO: 57 % (ref 43–75)
NONHDLC SERPL-MCNC: 123 MG/DL
NRBC BLD AUTO-RTO: 0 /100 WBCS
PLATELET # BLD AUTO: 297 THOUSANDS/UL (ref 149–390)
PMV BLD AUTO: 11.8 FL (ref 8.9–12.7)
POTASSIUM SERPL-SCNC: 3.5 MMOL/L (ref 3.5–5.3)
PROT SERPL-MCNC: 7.4 G/DL (ref 6.4–8.2)
RBC # BLD AUTO: 4.83 MILLION/UL (ref 3.81–5.12)
SODIUM SERPL-SCNC: 138 MMOL/L (ref 136–145)
TRIGL SERPL-MCNC: 80 MG/DL
TSH SERPL DL<=0.05 MIU/L-ACNC: 1.88 UIU/ML (ref 0.36–3.74)
VIT B12 SERPL-MCNC: 267 PG/ML (ref 100–900)
WBC # BLD AUTO: 7.75 THOUSAND/UL (ref 4.31–10.16)

## 2019-06-06 PROCEDURE — 80061 LIPID PANEL: CPT

## 2019-06-06 PROCEDURE — 80307 DRUG TEST PRSMV CHEM ANLYZR: CPT

## 2019-06-06 PROCEDURE — 80053 COMPREHEN METABOLIC PANEL: CPT

## 2019-06-06 PROCEDURE — 82306 VITAMIN D 25 HYDROXY: CPT

## 2019-06-06 PROCEDURE — 85025 COMPLETE CBC W/AUTO DIFF WBC: CPT

## 2019-06-06 PROCEDURE — 82607 VITAMIN B-12: CPT

## 2019-06-06 PROCEDURE — 82746 ASSAY OF FOLIC ACID SERUM: CPT

## 2019-06-06 PROCEDURE — 82951 GLUCOSE TOLERANCE TEST (GTT): CPT

## 2019-06-06 PROCEDURE — 84443 ASSAY THYROID STIM HORMONE: CPT

## 2019-06-06 PROCEDURE — 36415 COLL VENOUS BLD VENIPUNCTURE: CPT

## 2019-06-07 LAB
AMPHETAMINES UR QL SCN: NEGATIVE NG/ML
BARBITURATES UR QL SCN: NEGATIVE NG/ML
BENZODIAZ UR QL: NEGATIVE NG/ML
BZE UR QL: NEGATIVE NG/ML
CANNABINOIDS UR QL SCN: NEGATIVE NG/ML
METHADONE UR QL SCN: NEGATIVE NG/ML
OPIATES UR QL: NEGATIVE NG/ML
PCP UR QL: NEGATIVE NG/ML
PROPOXYPH UR QL SCN: NEGATIVE NG/ML

## 2019-08-16 RX ORDER — BUPROPION HYDROCHLORIDE 75 MG/1
TABLET ORAL
COMMUNITY
Start: 2019-06-02 | End: 2021-09-21 | Stop reason: ALTCHOICE

## 2019-08-16 RX ORDER — BUSPIRONE HYDROCHLORIDE 5 MG/1
5 TABLET ORAL 2 TIMES DAILY
Refills: 0 | COMMUNITY
Start: 2019-06-01 | End: 2021-09-21 | Stop reason: ALTCHOICE

## 2019-08-19 ENCOUNTER — TRANSCRIBE ORDERS (OUTPATIENT)
Dept: LAB | Facility: HOSPITAL | Age: 44
End: 2019-08-19

## 2019-08-19 ENCOUNTER — OFFICE VISIT (OUTPATIENT)
Dept: INTERNAL MEDICINE CLINIC | Facility: CLINIC | Age: 44
End: 2019-08-19

## 2019-08-19 ENCOUNTER — APPOINTMENT (OUTPATIENT)
Dept: LAB | Facility: HOSPITAL | Age: 44
End: 2019-08-19
Payer: COMMERCIAL

## 2019-08-19 ENCOUNTER — TELEPHONE (OUTPATIENT)
Dept: INTERNAL MEDICINE CLINIC | Facility: CLINIC | Age: 44
End: 2019-08-19

## 2019-08-19 VITALS
WEIGHT: 220.9 LBS | TEMPERATURE: 98.4 F | SYSTOLIC BLOOD PRESSURE: 110 MMHG | DIASTOLIC BLOOD PRESSURE: 84 MMHG | HEART RATE: 80 BPM | BODY MASS INDEX: 37.71 KG/M2 | HEIGHT: 64 IN

## 2019-08-19 DIAGNOSIS — E78.00 ELEVATED LDL CHOLESTEROL LEVEL: Chronic | ICD-10-CM

## 2019-08-19 DIAGNOSIS — Z28.39 IMMUNIZATION DEFICIENCY: Chronic | ICD-10-CM

## 2019-08-19 DIAGNOSIS — Z11.1 SCREENING FOR TUBERCULOSIS: ICD-10-CM

## 2019-08-19 DIAGNOSIS — Z28.39 IMMUNIZATION DEFICIENCY: Primary | ICD-10-CM

## 2019-08-19 DIAGNOSIS — E66.09 CLASS 2 OBESITY DUE TO EXCESS CALORIES WITHOUT SERIOUS COMORBIDITY WITH BODY MASS INDEX (BMI) OF 37.0 TO 37.9 IN ADULT: Chronic | ICD-10-CM

## 2019-08-19 DIAGNOSIS — Z00.00 WELL ADULT EXAM: Primary | ICD-10-CM

## 2019-08-19 DIAGNOSIS — Z02.89 ENCOUNTER FOR PHYSICAL EXAMINATION RELATED TO EMPLOYMENT: Chronic | ICD-10-CM

## 2019-08-19 DIAGNOSIS — F31.10 BIPOLAR AFFECTIVE DISORDER, CURRENT EPISODE MANIC, CURRENT EPISODE SEVERITY UNSPECIFIED (HCC): ICD-10-CM

## 2019-08-19 DIAGNOSIS — Z80.0 FAMILY HISTORY OF COLON CANCER: ICD-10-CM

## 2019-08-19 PROBLEM — R06.83 SNORING: Status: RESOLVED | Noted: 2018-12-28 | Resolved: 2019-08-19

## 2019-08-19 PROBLEM — G43.109 MIGRAINE WITH AURA AND WITHOUT STATUS MIGRAINOSUS, NOT INTRACTABLE: Status: RESOLVED | Noted: 2018-12-28 | Resolved: 2019-08-19

## 2019-08-19 PROBLEM — R40.0 DAYTIME SOMNOLENCE: Status: RESOLVED | Noted: 2018-12-28 | Resolved: 2019-08-19

## 2019-08-19 PROBLEM — E66.812 CLASS 2 OBESITY DUE TO EXCESS CALORIES WITHOUT SERIOUS COMORBIDITY WITH BODY MASS INDEX (BMI) OF 37.0 TO 37.9 IN ADULT: Chronic | Status: ACTIVE | Noted: 2019-08-19

## 2019-08-19 LAB — RUBV IGG SERPL IA-ACNC: 11.7 IU/ML

## 2019-08-19 PROCEDURE — 86580 TB INTRADERMAL TEST: CPT | Performed by: PHYSICIAN ASSISTANT

## 2019-08-19 PROCEDURE — 86762 RUBELLA ANTIBODY: CPT

## 2019-08-19 PROCEDURE — 1036F TOBACCO NON-USER: CPT | Performed by: PHYSICIAN ASSISTANT

## 2019-08-19 PROCEDURE — 86706 HEP B SURFACE ANTIBODY: CPT

## 2019-08-19 PROCEDURE — 86787 VARICELLA-ZOSTER ANTIBODY: CPT

## 2019-08-19 PROCEDURE — 86765 RUBEOLA ANTIBODY: CPT

## 2019-08-19 PROCEDURE — G0438 PPPS, INITIAL VISIT: HCPCS | Performed by: PHYSICIAN ASSISTANT

## 2019-08-19 PROCEDURE — 86735 MUMPS ANTIBODY: CPT

## 2019-08-19 PROCEDURE — 3725F SCREEN DEPRESSION PERFORMED: CPT | Performed by: PHYSICIAN ASSISTANT

## 2019-08-19 PROCEDURE — 36415 COLL VENOUS BLD VENIPUNCTURE: CPT

## 2019-08-19 PROCEDURE — 3008F BODY MASS INDEX DOCD: CPT | Performed by: PHYSICIAN ASSISTANT

## 2019-08-19 RX ORDER — LURASIDONE HYDROCHLORIDE 20 MG/1
20 TABLET, FILM COATED ORAL
Refills: 0 | COMMUNITY
Start: 2019-07-09 | End: 2019-08-19 | Stop reason: ALTCHOICE

## 2019-08-19 NOTE — TELEPHONE ENCOUNTER
Patient is here today for PPD placement  PPD placed in left forearm  Patient does have paperwork to be completed  Form can be found in Candie's folder  TB test was placed on 8/19/2019 at 2:00 pm      Patient is aware to return for TB test reading on 8/21/2019 after 2:00 pm (or) 8/22/2019 before 2:00 pm     Patient was provided a TB appointment reminder with this information

## 2019-08-19 NOTE — PATIENT INSTRUCTIONS
As per our discussion PPD for tuberculosis screening placed today  You confirm you can return Wednesday afternoon or Thursday morning for reading  Script provided for the blood tests for your other immunity for measles mumps rubella, chickenpox as well as hepatitis-B  We will contact you with those results and advise if you need any booster shots  Tetanus shot is up-to-date and due in the year 2027  Flu vaccine this fall     Please call the gastroenterologist office back to find out when they will be rescheduling your colonoscopy that was canceled by them in May earlier this year  Please schedule your annual checkup appointment with your gyn  According to her records last visit was 2017  Continue routine follow-up for dental care in cleaning  Congratulations on healthy lifestyle changes and positive weight loss  Once her PPD is red and we have the results of your blood tests we will advise you when her form is ready to be picked up

## 2019-08-19 NOTE — PROGRESS NOTES
Assessment/Plan:  As per our discussion PPD for tuberculosis screening placed today  You confirm you can return Wednesday afternoon or Thursday morning for reading  Script provided for the blood tests for your other immunity for measles mumps rubella, chickenpox as well as hepatitis-B  We will contact you with those results and advise if you need any booster shots  Tetanus shot is up-to-date and due in the year 2027  Flu vaccine this fall     Please call the gastroenterologist office back to find out when they will be rescheduling your colonoscopy that was canceled by them in May earlier this year  Please schedule your annual checkup appointment with your gyn  According to her records last visit was 2017  Continue routine follow-up for dental care in cleaning  Congratulations on healthy lifestyle changes and positive weight loss  Once her PPD is red and we have the results of your blood tests we will advise you when her form is ready to be picked up  No problem-specific Assessment & Plan notes found for this encounter  Diagnoses and all orders for this visit:    Well adult exam    Class 2 obesity due to excess calories without serious comorbidity with body mass index (BMI) of 37 0 to 37 9 in adult    Encounter for physical examination related to employment    Immunization deficiency  -     Measles/Mumps/Rubella Immunity; Future  -     Varicella zoster antibody, IgG; Future  -     Hepatitis B surface antibody; Future    Bipolar affective disorder, current episode manic, current episode severity unspecified ([facility] )    Elevated LDL cholesterol level    Family history of colon cancer    Other orders  -     buPROPion (WELLBUTRIN) 75 mg tablet  -     busPIRone (BUSPAR) 5 mg tablet; Take 5 mg by mouth 2 (two) times a day  -     Discontinue: LATUDA 20 MG tablet; Take 20 mg by mouth daily at bedtime          Subjective:      Patient ID: Tim Upton is a 40 y o  female      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  Reports already advised medicare and will be letting welfare know as well  States aware the amount of time / money she is allowed to work and keep her benefits  States has been feeling better and just does not want to sit home all the time  Has also been making dietary improvements, stopped sugary drinks  Has been walking more regularly  Positive 8 lb weight loss  Continues with [facility] on Wellbutrin and Buspar daily feels really helping  Seeing therapist once a month and psych once a month as well  Patient reports she has been doing very well  While she states she never had a lot of extreme mood swings she has noticed being back in therapy on an medications that she has been feeling much better and able to get a lot more accomplished  This is also the reason while she is interested in working part-time as she does not like to sit home all day  Colonoscopy was canceled in May by GI but has not had called back to reschedule  Positive family history colon cancer  Patient will be getting the PPD test for tuberculosis screening placed today  She confirm she can return Wednesday afternoon or Thursday morning  Patient does not have any of her immunization records  She reports when she was young she was in foster care and does not have any of those records  Review of labs completed in June of this year shows that cholesterol was good just slightly elevated LDL  Sugar, kidney liver also all normal   Patient has not seen her gyn recently  She is aware she needs to call and schedule routine follow-up for women's health  She is also now reporting that her mother was recently diagnosed with ovarian cancer  There is also known breast cancer in the family  As per genetic testing in 2017 patient was negative for BRCA    Patient is aware however that this does not mean she could not have breast cancer just that it would not be related to this genetic cause  Advised patient given additional information likely related to same genetic mutation if in same side of the family and she still needs to schedule routine gyn appointments  Eye exam completed in the office and results are acceptable  20/20 right and 20/25 left      The following portions of the patient's history were reviewed and updated as appropriate: allergies, current medications, past family history, past medical history, past social history, past surgical history and problem list     Review of Systems   Constitutional: Negative for appetite change, diaphoresis, fatigue and unexpected weight change  Eyes: Negative  Respiratory: Negative  Negative for cough and shortness of breath  Cardiovascular: Negative  Negative for chest pain, palpitations and leg swelling  Gastrointestinal: Negative  Negative for abdominal pain, constipation, diarrhea and vomiting  Endocrine: Negative  Negative for cold intolerance and heat intolerance  Genitourinary: Negative  Musculoskeletal: Negative  Negative for arthralgias, back pain, joint swelling and myalgias  Skin: Negative  Negative for rash  Neurological: Negative  Negative for dizziness, syncope, weakness, light-headedness and headaches  Psychiatric/Behavioral: Negative  Negative for decreased concentration, self-injury, sleep disturbance and suicidal ideas  The patient is not nervous/anxious  Objective:      /84 (BP Location: Left arm, Patient Position: Sitting, Cuff Size: Large)   Pulse 80   Temp 98 4 °F (36 9 °C) (Oral)   Ht 5' 4 25" (1 632 m)   Wt 100 kg (220 lb 14 4 oz)   BMI 37 62 kg/m²          Physical Exam   Constitutional: She is oriented to person, place, and time  She appears well-developed and well-nourished  HENT:   Head: Normocephalic and atraumatic  Mouth/Throat: No oropharyngeal exudate  Eyes: Pupils are equal, round, and reactive to light  Conjunctivae and EOM are normal    Neck: Normal range of motion  Neck supple  No thyromegaly present  Cardiovascular: Normal rate, regular rhythm and normal heart sounds  No murmur heard  Pulmonary/Chest: Effort normal and breath sounds normal    Abdominal: Soft  Bowel sounds are normal  There is no tenderness  Musculoskeletal: She exhibits no edema  Neurological: She is alert and oriented to person, place, and time  Skin: Skin is warm and dry  Psychiatric: She has a normal mood and affect  Her behavior is normal  Thought content normal    Nursing note and vitals reviewed  BMI Counseling: Body mass index is 37 62 kg/m²  Discussed the patient's BMI with her  The BMI is above average  BMI counseling and education was provided to the patient  Nutrition recommendations include reducing portion sizes, decreasing overall calorie intake, consuming healthier snacks, moderation in carbohydrate intake, reducing intake of saturated fat and trans fat and reducing intake of cholesterol  Exercise recommendations include exercising 3-5 times per week       PHQ-9 Depression Screening    PHQ-9:    Frequency of the following problems over the past two weeks:       Little interest or pleasure in doing things:  0 - not at all  Feeling down, depressed, or hopeless:  0 - not at all  PHQ-2 Score:  0

## 2019-08-19 NOTE — TELEPHONE ENCOUNTER
Patient here today for an employment physical and she bring a form to be completed ([address])

## 2019-08-20 LAB
HBV SURFACE AB SER-ACNC: 4.23 MIU/ML
MEV IGG SER QL: NORMAL
MUV IGG SER QL: ABNORMAL

## 2019-08-21 DIAGNOSIS — Z23 NEED FOR HEPATITIS B BOOSTER VACCINATION: Primary | ICD-10-CM

## 2019-08-21 DIAGNOSIS — Z23 NEED FOR MMR VACCINE: ICD-10-CM

## 2019-08-21 LAB
INDURATION: 0 MM
TB SKIN TEST: NEGATIVE
VZV IGG SER IA-ACNC: NORMAL

## 2019-08-21 PROCEDURE — G0010 ADMIN HEPATITIS B VACCINE: HCPCS | Performed by: INTERNAL MEDICINE

## 2019-08-21 PROCEDURE — 90707 MMR VACCINE SC: CPT | Performed by: INTERNAL MEDICINE

## 2019-08-21 PROCEDURE — 90746 HEPB VACCINE 3 DOSE ADULT IM: CPT | Performed by: INTERNAL MEDICINE

## 2019-08-21 PROCEDURE — 90471 IMMUNIZATION ADMIN: CPT | Performed by: INTERNAL MEDICINE

## 2019-08-21 NOTE — PROGRESS NOTES
PT  CAME IN TO THE OFFICE TO HAVE A PPD READ AND SHOWED ON HER TITER LABS THAT SHE IS NOT IMMUNE TO HEP B AND MUMPS  INFO  GIVEN TO DARCY GAYTAN AND ADVISED TO START SERIES ON BOTH  PT  OK WITH THAT AND DOSE #1 OF HEP B GIVEN IN LEFT DELTOID AND DOSE #1 OF MMR GIVEN IN LEFT ARM SUBQ  PT  TO RETURN IN 1-2 MONTHS FOR 2ND STEP HEP B   THEN 6 MONTHS FOR 3RD HEP B AND MMR #2

## 2019-08-21 NOTE — TELEPHONE ENCOUNTER
PPD READ ON 8/21/19 AS NEGATIVE, PT  HAD TITERS DONE AND IS NOT IMMUNE TO HEP B OR MUMPS  STARTED BOTH SERIES TODAY  DOSE #1 OF HEP B GIVEN IN LEFT DELTOID, DOSE #1 OF MMR GIVEN IN LEFT ARM SUBQ  I COULD NOT GIVE FORM BACK TO PT  SHE WILL DISCUSS WITH HER EMPLOYER IF I CAN GIVE HER A NOTE STATING SHE STARTED THE SERIES   PT  WILL LET ME KNOW

## 2021-06-24 ENCOUNTER — VBI (OUTPATIENT)
Dept: ADMINISTRATIVE | Facility: OTHER | Age: 46
End: 2021-06-24

## 2021-09-21 ENCOUNTER — APPOINTMENT (OUTPATIENT)
Dept: LAB | Facility: CLINIC | Age: 46
End: 2021-09-21
Payer: COMMERCIAL

## 2021-09-21 ENCOUNTER — TELEPHONE (OUTPATIENT)
Dept: INTERNAL MEDICINE CLINIC | Facility: CLINIC | Age: 46
End: 2021-09-21

## 2021-09-21 ENCOUNTER — OFFICE VISIT (OUTPATIENT)
Dept: INTERNAL MEDICINE CLINIC | Facility: CLINIC | Age: 46
End: 2021-09-21

## 2021-09-21 VITALS
DIASTOLIC BLOOD PRESSURE: 84 MMHG | OXYGEN SATURATION: 98 % | SYSTOLIC BLOOD PRESSURE: 121 MMHG | BODY MASS INDEX: 40.97 KG/M2 | HEIGHT: 64 IN | HEART RATE: 87 BPM | WEIGHT: 240 LBS | TEMPERATURE: 97.7 F

## 2021-09-21 DIAGNOSIS — Z11.1 TUBERCULOSIS SCREENING: ICD-10-CM

## 2021-09-21 DIAGNOSIS — Z23 NEED FOR HEPATITIS B VACCINATION: ICD-10-CM

## 2021-09-21 DIAGNOSIS — Z28.39 IMMUNIZATION DEFICIENCY: Chronic | ICD-10-CM

## 2021-09-21 DIAGNOSIS — Z12.11 COLON CANCER SCREENING: ICD-10-CM

## 2021-09-21 DIAGNOSIS — Z12.4 CERVICAL CANCER SCREENING: ICD-10-CM

## 2021-09-21 DIAGNOSIS — F31.77 BIPOLAR DISORDER, IN PARTIAL REMISSION, MOST RECENT EPISODE MIXED (HCC): ICD-10-CM

## 2021-09-21 DIAGNOSIS — K08.9 POOR DENTITION: ICD-10-CM

## 2021-09-21 DIAGNOSIS — Z23 NEED FOR INFLUENZA VACCINATION: ICD-10-CM

## 2021-09-21 DIAGNOSIS — Z02.89 ENCOUNTER FOR PHYSICAL EXAMINATION RELATED TO EMPLOYMENT: Primary | Chronic | ICD-10-CM

## 2021-09-21 DIAGNOSIS — E66.01 CLASS 3 SEVERE OBESITY DUE TO EXCESS CALORIES WITHOUT SERIOUS COMORBIDITY WITH BODY MASS INDEX (BMI) OF 40.0 TO 44.9 IN ADULT (HCC): ICD-10-CM

## 2021-09-21 DIAGNOSIS — Z12.31 ENCOUNTER FOR SCREENING MAMMOGRAM FOR MALIGNANT NEOPLASM OF BREAST: ICD-10-CM

## 2021-09-21 DIAGNOSIS — E78.00 ELEVATED LDL CHOLESTEROL LEVEL: Chronic | ICD-10-CM

## 2021-09-21 PROBLEM — E66.9 OBESITY (BMI 35.0-39.9 WITHOUT COMORBIDITY): Status: RESOLVED | Noted: 2018-12-28 | Resolved: 2021-09-21

## 2021-09-21 PROBLEM — E66.09 CLASS 2 OBESITY DUE TO EXCESS CALORIES WITHOUT SERIOUS COMORBIDITY WITH BODY MASS INDEX (BMI) OF 37.0 TO 37.9 IN ADULT: Chronic | Status: RESOLVED | Noted: 2019-08-19 | Resolved: 2021-09-21

## 2021-09-21 PROBLEM — E66.812 CLASS 2 OBESITY DUE TO EXCESS CALORIES WITHOUT SERIOUS COMORBIDITY WITH BODY MASS INDEX (BMI) OF 37.0 TO 37.9 IN ADULT: Chronic | Status: RESOLVED | Noted: 2019-08-19 | Resolved: 2021-09-21

## 2021-09-21 PROBLEM — E66.813 CLASS 3 SEVERE OBESITY DUE TO EXCESS CALORIES WITHOUT SERIOUS COMORBIDITY WITH BODY MASS INDEX (BMI) OF 40.0 TO 44.9 IN ADULT (HCC): Status: ACTIVE | Noted: 2021-09-21

## 2021-09-21 LAB
ALBUMIN SERPL BCP-MCNC: 3.2 G/DL (ref 3.5–5)
ALP SERPL-CCNC: 81 U/L (ref 46–116)
ALT SERPL W P-5'-P-CCNC: 21 U/L (ref 12–78)
ANION GAP SERPL CALCULATED.3IONS-SCNC: 4 MMOL/L (ref 4–13)
AST SERPL W P-5'-P-CCNC: 10 U/L (ref 5–45)
BILIRUB SERPL-MCNC: 0.28 MG/DL (ref 0.2–1)
BUN SERPL-MCNC: 11 MG/DL (ref 5–25)
CALCIUM ALBUM COR SERPL-MCNC: 9.5 MG/DL (ref 8.3–10.1)
CALCIUM SERPL-MCNC: 8.9 MG/DL (ref 8.3–10.1)
CHLORIDE SERPL-SCNC: 106 MMOL/L (ref 100–108)
CO2 SERPL-SCNC: 26 MMOL/L (ref 21–32)
CREAT SERPL-MCNC: 0.64 MG/DL (ref 0.6–1.3)
GFR SERPL CREATININE-BSD FRML MDRD: 107 ML/MIN/1.73SQ M
GLUCOSE P FAST SERPL-MCNC: 147 MG/DL (ref 65–99)
POTASSIUM SERPL-SCNC: 3.6 MMOL/L (ref 3.5–5.3)
PROT SERPL-MCNC: 7.6 G/DL (ref 6.4–8.2)
RUBV IGG SERPL IA-ACNC: 70.7 IU/ML
SODIUM SERPL-SCNC: 136 MMOL/L (ref 136–145)

## 2021-09-21 PROCEDURE — 3725F SCREEN DEPRESSION PERFORMED: CPT | Performed by: PHYSICIAN ASSISTANT

## 2021-09-21 PROCEDURE — 3008F BODY MASS INDEX DOCD: CPT | Performed by: PHYSICIAN ASSISTANT

## 2021-09-21 PROCEDURE — 80053 COMPREHEN METABOLIC PANEL: CPT | Performed by: PHYSICIAN ASSISTANT

## 2021-09-21 PROCEDURE — 90746 HEPB VACCINE 3 DOSE ADULT IM: CPT | Performed by: PHYSICIAN ASSISTANT

## 2021-09-21 PROCEDURE — G0008 ADMIN INFLUENZA VIRUS VAC: HCPCS | Performed by: PHYSICIAN ASSISTANT

## 2021-09-21 PROCEDURE — 99213 OFFICE O/P EST LOW 20 MIN: CPT | Performed by: PHYSICIAN ASSISTANT

## 2021-09-21 PROCEDURE — 90686 IIV4 VACC NO PRSV 0.5 ML IM: CPT | Performed by: PHYSICIAN ASSISTANT

## 2021-09-21 PROCEDURE — 1036F TOBACCO NON-USER: CPT | Performed by: PHYSICIAN ASSISTANT

## 2021-09-21 PROCEDURE — 86735 MUMPS ANTIBODY: CPT

## 2021-09-21 PROCEDURE — 86762 RUBELLA ANTIBODY: CPT

## 2021-09-21 PROCEDURE — G0010 ADMIN HEPATITIS B VACCINE: HCPCS | Performed by: PHYSICIAN ASSISTANT

## 2021-09-21 PROCEDURE — 36415 COLL VENOUS BLD VENIPUNCTURE: CPT | Performed by: PHYSICIAN ASSISTANT

## 2021-09-21 PROCEDURE — 86580 TB INTRADERMAL TEST: CPT | Performed by: PHYSICIAN ASSISTANT

## 2021-09-21 PROCEDURE — 86765 RUBEOLA ANTIBODY: CPT

## 2021-09-21 NOTE — PROGRESS NOTES
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  Script provided to get your labs completed for elevated cholesterol in the past     Referrals today provided for GI to get scheduled for your colon cancer screening  You are aware that this is very important given positive family history of colon cancer  Also referral for gyn for women's health and Pap smear as well as to call and schedule your mammogram     You will contact her dentist for updated visit as you have not seen them in almost 4 years  If you do require extensive extractions we can provide referral to oral surgeon for further evaluation as well  We did review at last employment physical in 2019 you did require MMR booster and I provided you with a script for updated labs to make sure it confirms immunity  You also needed updated immunizations for hepatitis-B, you received the 1st 1 but admit you did  not return for the 2nd or 3rd vaccine     you have completed your COVID vaccine series  Flu vaccine provided today  Hepatitis B #2  Provided today  PPD placed and you do confirm you can return within 48-72 hours for the reading  Once PPD completed and MMR blood test results available we will contact you regarding your employment form  Referral also provided today for our  as you are interested in establishing care with new mental health services  No problem-specific Assessment & Plan notes found for this encounter         Diagnoses and all orders for this visit:    Encounter for physical examination related to employment    Class 3 severe obesity due to excess calories without serious comorbidity with body mass index (BMI) of 40 0 to 44 9 in adult ([facility] )    Elevated LDL cholesterol level  -     Comprehensive metabolic panel  -     Lipid Panel with Direct LDL reflex    Colon cancer screening  - Ambulatory referral to Gastroenterology; Future    Encounter for screening mammogram for malignant neoplasm of breast  -     Mammo screening bilateral w 3d & cad; Future    Bipolar disorder, in partial remission, most recent episode mixed ([address] )  -     Ambulatory referral to social work care management program; Future    Poor dentition    Need for influenza vaccination  -     influenza vaccine, quadrivalent, 0 5 mL, preservative-free, for adult and pediatric patients 6 mos+ (AFLURIA, FLUARIX, FLULAVAL, FLUZONE)    Need for hepatitis B vaccination  -     HEPATITIS B VACCINE ADULT IM    Cervical cancer screening  -     Ambulatory referral to Obstetrics / Gynecology; Future    Immunization deficiency  -     Measles/Mumps/Rubella Immunity; Future    Tuberculosis screening  -     TB Skin Test          Subjective:      Patient ID: Eladio Fish is a 55 y o  female  Patient presents today for employment physical     Please also note patient has not been seen since 2019  At that time patient was also seen for pre employment physical   Labs drawn at that time required updated mm are and was to complete hepatitis B vaccine series but only 1 was given, states did not return due to Pandemic     patient is also overdue for her routine health maintenance  Mammogram not since 2017  Patient was scheduled for colonoscopy twice in 2019 and canceled both appointments  States just got worried  Positive FH sister with colon cancer in her 42's, unsure what treatment but is alive and in her 52's now    States mother told her recently diagnosed with ovarian cancer in her 66's    Patient last saw her Gyn about 5 years ago  Completed COVID series  States is looking for new  location was going to [place] felt not meeting her needs, states has not seen anyone in 2 years now  States overall coping well  Without therapy and medications      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    Last saw dentist about 4 years ago was told then may need extractions, will call to reschedule and if needed can get referral to oral surgeon  Patient states that she did call our [facility] dentist but they were not accepting any new patients at this time  Patient completed her COVID vaccine series  Patient will receive her 2nd hepatitis-B vaccine, flu vaccine and PPD today  Patient did require a MMR booster shot in 2019 and I will order titers to confirm immunity at this point  Patient is aware of the importance of getting her health maintenance up-to-date and maintaining as well  The following portions of the patient's history were reviewed and updated as appropriate: allergies, current medications, past family history, past medical history, past social history, past surgical history and problem list     Review of Systems   Constitutional: Negative  Negative for chills, fever and unexpected weight change  Eyes: Negative for visual disturbance  Respiratory: Negative  Negative for cough and shortness of breath  Cardiovascular: Negative  Gastrointestinal: Negative for abdominal pain, blood in stool, constipation, nausea and vomiting  Gets diarrhea but only if drinks milk   Endocrine: Negative  Negative for polydipsia, polyphagia and polyuria  Genitourinary: Negative for menstrual problem  Musculoskeletal: Negative  Skin: Negative  Neurological: Negative  Negative for dizziness, light-headedness and headaches  Psychiatric/Behavioral: The patient is nervous/anxious  Objective:      /84 (BP Location: Left arm, Patient Position: Sitting, Cuff Size: Large)   Pulse 87   Temp 97 7 °F (36 5 °C) (Temporal)   Ht 5' 4" (1 626 m)   Wt 109 kg (240 lb)   SpO2 98%   BMI 41 20 kg/m²          Physical Exam  Vitals and nursing note reviewed  Constitutional:       General: She is not in acute distress       Appearance: She is obese  HENT:      Head: Normocephalic  Mouth/Throat:      Mouth: Mucous membranes are moist       Comments: Poor dentition  Eyes:      Conjunctiva/sclera: Conjunctivae normal    Cardiovascular:      Rate and Rhythm: Normal rate and regular rhythm  Heart sounds: Normal heart sounds  Pulmonary:      Effort: Pulmonary effort is normal       Breath sounds: Normal breath sounds  No wheezing  Abdominal:      General: Bowel sounds are normal       Tenderness: There is no abdominal tenderness  Musculoskeletal:      Right lower leg: No edema  Left lower leg: No edema  Skin:     Findings: No rash  Neurological:      Mental Status: She is alert  Mental status is at baseline  Psychiatric:         Mood and Affect: Mood normal          Thought Content: Thought content normal          Judgment: Judgment normal          BMI Counseling: Body mass index is 41 2 kg/m²  The BMI is above normal  Nutrition recommendations include reducing portion sizes, decreasing overall calorie intake, decreasing soda and/or juice intake, moderation in carbohydrate intake, reducing intake of saturated fat and trans fat and reducing intake of cholesterol  Exercise recommendations include exercising 3-5 times per week

## 2021-09-21 NOTE — PATIENT INSTRUCTIONS
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  Script provided to get your labs completed for elevated cholesterol in the past     Referrals today provided for GI to get scheduled for your colon cancer screening  You are aware that this is very important given positive family history of colon cancer  Also referral for gyn for women's health and Pap smear as well as to call and schedule your mammogram     You will contact her dentist for updated visit as you have not seen them in almost 4 years  If you do require extensive extractions we can provide referral to oral surgeon for further evaluation as well  We did review at last employment physical in 2019 you did require MMR booster and I provided you with a script for updated labs to make sure it confirms immunity  You also needed updated immunizations for hepatitis-B, you received the 1st 1 but admit you did  not return for the 2nd or 3rd vaccine     you have completed your COVID vaccine series  Flu vaccine provided today  Hepatitis B 2  Provided today  PPD placed and you do confirm you can return within 48-72 hours for the reading  Once PPD completed and MMR blood test results available we will contact you regarding your employment form  Referral also provided today for our  as you are interested in establishing care with new mental health services  Weight Management   AMBULATORY CARE:   Why it is important to manage your weight:  Being overweight increases your risk of health conditions such as heart disease, high blood pressure, type 2 diabetes, and certain types of cancer  It can also increase your risk for osteoarthritis, sleep apnea, and other respiratory problems  Aim for a slow, steady weight loss  Even a small amount of weight loss can lower your risk of health problems    How to lose weight safely:  A safe and healthy way to lose weight is to eat fewer calories and get regular exercise  · You can lose up about 1 pound a week by decreasing the number of calories you eat by 500 calories each day  You can decrease calories by eating smaller portion sizes or by cutting out high-calorie foods  Read labels to find out how many calories are in the foods you eat  · You can also burn calories with exercise such as walking, swimming, or biking  You will be more likely to keep weight off if you make these changes part of your lifestyle  Exercise at least 30 minutes per day on most days of the week  You can also fit in more physical activity by taking the stairs instead of the elevator or parking farther away from stores  Ask your healthcare provider about the best exercise plan for you  Healthy meal plan for weight management:  A healthy meal plan includes a variety of foods, contains fewer calories, and helps you stay healthy  A healthy meal plan includes the following:     · Eat whole-grain foods more often  A healthy meal plan should contain fiber  Fiber is the part of grains, fruits, and vegetables that is not broken down by your body  Whole-grain foods are healthy and provide extra fiber in your diet  Some examples of whole-grain foods are whole-wheat breads and pastas, oatmeal, brown rice, and bulgur  · Eat a variety of vegetables every day  Include dark, leafy greens such as spinach, kale, brina greens, and mustard greens  Eat yellow and orange vegetables such as carrots, sweet potatoes, and winter squash  · Eat a variety of fruits every day  Choose fresh or canned fruit (canned in its own juice or light syrup) instead of juice  Fruit juice has very little or no fiber  · Eat low-fat dairy foods  Drink fat-free (skim) milk or 1% milk  Eat fat-free yogurt and low-fat cottage cheese  Try low-fat cheeses such as mozzarella and other reduced-fat cheeses      · Choose meat and other protein foods that are low in fat  Choose beans or other legumes such as split peas or lentils  Choose fish, skinless poultry (chicken or turkey), or lean cuts of red meat (beef or pork)  Before you cook meat or poultry, cut off any visible fat  · Use less fat and oil  Try baking foods instead of frying them  Add less fat, such as margarine, sour cream, regular salad dressing and mayonnaise to foods  Eat fewer high-fat foods  Some examples of high-fat foods include french fries, doughnuts, ice cream, and cakes  · Eat fewer sweets  Limit foods and drinks that are high in sugar  This includes candy, cookies, regular soda, and sweetened drinks  Ways to decrease calories:   · Eat smaller portions  ? Use a small plate with smaller servings  ? Do not eat second helpings  ? When you eat at a restaurant, ask for a box and place half of your meal in the box before you eat  ? Share an entrée with someone else  · Replace high-calorie snacks with healthy, low-calorie snacks  ? Choose fresh fruit, vegetables, fat-free rice cakes, or air-popped popcorn instead of potato chips, nuts, or chocolate  ? Choose water or calorie-free drinks instead of soda or sweetened drinks  · Do not shop for groceries when you are hungry  You may be more likely to make unhealthy food choices  Take a grocery list of healthy foods and shop after you have eaten  · Eat regular meals  Do not skip meals  Skipping meals can lead to overeating later in the day  This can make it harder for you to lose weight  Eat a healthy snack in place of a meal if you do not have time to eat a regular meal  Talk with a dietitian to help you create a meal plan and schedule that is right for you  Other things to consider as you try to lose weight:   · Be aware of situations that may give you the urge to overeat, such as eating while watching television  Find ways to avoid these situations   For example, read a book, go for a walk, or do ana laura  · Meet with a weight loss support group or friends who are also trying to lose weight  This may help you stay motivated to continue working on your weight loss goals  © Copyright [employer] 2021 Information is for End User's use only and may not be sold, redistributed or otherwise used for commercial purposes  All illustrations and images included in CareNotes® are the copyrighted property of A [employer] M [employer]  or Evangelina Sanchez   The above information is an  only  It is not intended as medical advice for individual conditions or treatments  Talk to your doctor, nurse or pharmacist before following any medical regimen to see if it is safe and effective for you  Low Fat Diet   AMBULATORY CARE:   A low-fat diet  is an eating plan that is low in total fat, unhealthy fat, and cholesterol  You may need to follow a low-fat diet if you have trouble digesting or absorbing fat  You may also need to follow this diet if you have high cholesterol  You can also lower your cholesterol by increasing the amount of fiber in your diet  Soluble fiber is a type of fiber that helps to decrease cholesterol levels  Different types of fat in food:   · Limit unhealthy fats  A diet that is high in cholesterol, saturated fat, and trans fat may cause unhealthy cholesterol levels  Unhealthy cholesterol levels increase your risk of heart disease  ? Cholesterol:  Limit intake of cholesterol to less than 200 mg per day  Cholesterol is found in meat, eggs, and dairy  ? Saturated fat:  Limit saturated fat to less than 7% of your total daily calories  Ask your dietitian how many calories you need each day  Saturated fat is found in butter, cheese, ice cream, whole milk, and palm oil  Saturated fat is also found in meat, such as beef, pork, chicken skin, and processed meats  Processed meats include sausage, hot dogs, and bologna  ? Trans fat:  Avoid trans fat as much as possible   Trans fat is used in fried and baked foods  Foods that say trans fat free on the label may still have up to 0 5 grams of trans fat per serving  · Include healthy fats  Replace foods that are high in saturated and trans fat with foods high in healthy fats  This may help to decrease high cholesterol levels  ? Monounsaturated fats: These are found in avocados, nuts, and vegetable oils, such as olive, canola, and sunflower oil  ? Polyunsaturated fats: These can be found in vegetable oils, such as soybean or corn oil  Omega-3 fats can help to decrease the risk of heart disease  Omega-3 fats are found in fish, such as salmon, herring, trout, and tuna  Omega-3 fats can also be found in plant foods, such as walnuts, flaxseed, soybeans, and canola oil  Foods to limit or avoid:   · Grains:      ? Snacks that are made with partially hydrogenated oils, such as chips, regular crackers, and butter-flavored popcorn    ? High-fat baked goods, such as biscuits, croissants, doughnuts, pies, cookies, and pastries    · Dairy:      ? Whole milk, 2% milk, and yogurt and ice cream made with whole milk    ? Half and half creamer, heavy cream, and whipping cream    ? Cheese, cream cheese, and sour cream    · Meats and proteins:      ? High-fat cuts of meat (T-bone steak, regular hamburger, and ribs)    ? Fried meat, poultry (turkey and chicken), and fish    ? Poultry (chicken and turkey) with skin    ? Cold cuts (salami or bologna), hot dogs, myers, and sausage    ? Whole eggs and egg yolks    · Vegetables and fruits with added fat:      ? Fried vegetables or vegetables in butter or high-fat sauces, such as cream or cheese sauces    ? Fried fruit or fruit served with butter or cream    · Fats:      ? Butter, stick margarine, and shortening    ? Coconut, palm oil, and palm kernel oil    Foods to include:   · Grains:      ? Whole-grain breads, cereals, pasta, and brown rice    ?  Low-fat crackers and pretzels    · Vegetables and fruits: ? Fresh, frozen, or canned vegetables (no salt or low-sodium)    ? Fresh, frozen, dried, or canned fruit (canned in light syrup or fruit juice)    ? Avocado    · Low-fat dairy products:      ? Nonfat (skim) or 1% milk    ? Nonfat or low-fat cheese, yogurt, and cottage cheese    · Meats and proteins:      ? Chicken or turkey with no skin    ? Baked or broiled fish    ? Lean beef and pork (loin, round, extra lean hamburger)    ? Beans and peas, unsalted nuts, soy products    ? Egg whites and substitutes    ? Seeds and nuts    · Fats:      ? Unsaturated oil, such as canola, olive, peanut, soybean, or sunflower oil    ? Soft or liquid margarine and vegetable oil spread    ? Low-fat salad dressing    Other ways to decrease fat:   · Read food labels before you buy foods  Choose foods that have less than 30% of calories from fat  Choose low-fat or fat-free dairy products  Remember that fat free does not mean calorie free  These foods still contain calories, and too many calories can lead to weight gain  · Trim fat from meat and avoid fried food  Trim all visible fat from meat before you cook it  Remove the skin from poultry  Do not norton meat, fish, or poultry  Bake, roast, boil, or broil these foods instead  Avoid fried foods  Eat a baked potato instead of [place] fries  Steam vegetables instead of sautéing them in butter  · Add less fat to foods  Use imitation myers bits on salads and baked potatoes instead of regular myers bits  Use fat-free or low-fat salad dressings instead of regular dressings  Use low-fat or nonfat butter-flavored topping instead of regular butter or margarine on popcorn and other foods  Ways to decrease fat in recipes:  Replace high-fat ingredients with low-fat or nonfat ones  This may cause baked goods to be drier than usual  You may need to use nonfat cooking spray on pans to prevent food from sticking   You also may need to change the amount of other ingredients, such as water, in the recipe  Try the following:  · Use low-fat or light margarine instead of regular margarine or shortening  · Use lean ground turkey breast or chicken, or lean ground beef (less than 5% fat) instead of hamburger  · Add 1 teaspoon of canola oil to 8 ounces of skim milk instead of using cream or half and half  · Use grated zucchini, carrots, or apples in breads instead of coconut  · Use blenderized, low-fat cottage cheese, plain tofu, or low-fat ricotta cheese instead of cream cheese  · Use 1 egg white and 1 teaspoon of canola oil, or use ¼ cup (2 ounces) of fat-free egg substitute instead of a whole egg  · Replace half of the oil that is called for in a recipe with applesauce when you bake  Use 3 tablespoons of cocoa powder and 1 tablespoon of canola oil instead of a square of baking chocolate  How to increase fiber:  Eat enough high-fiber foods to get 20 to 30 grams of fiber every day  Slowly increase your fiber intake to avoid stomach cramps, gas, and other problems  · Eat 3 ounces of whole-grain foods each day  An ounce is about 1 slice of bread  Eat whole-grain breads, such as whole-wheat bread  Whole wheat, whole-wheat flour, or other whole grains should be listed as the first ingredient on the food label  Replace white flour with whole-grain flour or use half of each in recipes  Whole-grain flour is heavier than white flour, so you may have to add more yeast or baking powder  · Eat a high-fiber cereal for breakfast   Oatmeal is a good source of soluble fiber  Look for cereals that have bran or fiber in the name  Choose whole-grain products, such as brown rice, barley, and whole-wheat pasta  · Eat more beans, peas, and lentils  For example, add beans to soups or salads  Eat at least 5 cups of fruits and vegetables each day  Eat fruits and vegetables with the peel because the peel is high in fiber      © Copyright [employer] 2021 Information is for End User's use only and may not be sold, redistributed or otherwise used for commercial purposes  All illustrations and images included in CareNotes® are the copyrighted property of [employer] M [employer] Inc  or Evangelina Concepcion  The above information is an  only  It is not intended as medical advice for individual conditions or treatments  Talk to your doctor, nurse or pharmacist before following any medical regimen to see if it is safe and effective for you  Heart Healthy Diet   AMBULATORY CARE:   A heart healthy diet  is an eating plan low in unhealthy fats and sodium (salt)  The plan is high in healthy fats and fiber  A heart healthy diet helps improve your cholesterol levels and lowers your risk for heart disease and stroke  A dietitian will teach you how to read and understand food labels  Heart healthy diet guidelines to follow:   · Choose foods that contain healthy fats  ? Unsaturated fats  include monounsaturated and polyunsaturated fats  Unsaturated fat is found in foods such as soybean, canola, olive, corn, and safflower oils  It is also found in soft tub margarine that is made with liquid vegetable oil  ? Omega-3 fat  is found in certain fish, such as salmon, tuna, and trout, and in walnuts and flaxseed  Eat fish high in omega-3 fats at least 2 times a week  · Get 20 to 30 grams of fiber each day  Fruits, vegetables, whole-grain foods, and legumes (cooked beans) are good sources of fiber  · Limit or do not have unhealthy fats  ? Cholesterol  is found in animal foods, such as eggs and lobster, and in dairy products made from whole milk  Limit cholesterol to less than 200 mg each day  ? Saturated fat  is found in meats, such as myers and hamburger  It is also found in chicken or turkey skin, whole milk, and butter  Limit saturated fat to less than 7% of your total daily calories  ? Trans fat  is found in packaged foods, such as potato chips and cookies   It is also in hard margarine, some fried foods, and shortening  Do not eat foods that contain trans fats  · Limit sodium as directed  You may be told to limit sodium to 2,000 to 2,300 mg each day  Choose low-sodium or no-salt-added foods  Add little or no salt to food you prepare  Use herbs and spices in place of salt  Include the following in your heart healthy plan:  Ask your dietitian or healthcare provider how many servings to have from each of the following food groups:  · Grains:      ? Whole-wheat breads, cereals, and pastas, and brown rice    ? Low-fat, low-sodium crackers and chips    · Vegetables:      ? Broccoli, green beans, green peas, and spinach    ? Collards, kale, and lima beans    ? Carrots, sweet potatoes, tomatoes, and peppers    ? Canned vegetables with no salt added    · Fruits:      ? Bananas, peaches, pears, and pineapple    ? Grapes, raisins, and dates    ? Oranges, tangerines, grapefruit, orange juice, and grapefruit juice    ? Apricots, mangoes, melons, and papaya    ? Raspberries and strawberries    ? Canned fruit with no added sugar    · Low-fat dairy:      ? Nonfat (skim) milk, 1% milk, and low-fat almond, cashew, or soy milks fortified with calcium    ? Low-fat cheese, regular or frozen yogurt, and cottage cheese    · Meats and proteins:      ? Lean cuts of beef and pork (loin, leg, round), skinless chicken and turkey    ? Legumes, soy products, egg whites, or nuts    Limit or do not include the following in your heart healthy plan:   · Unhealthy fats and oils:      ? Whole or 2% milk, cream cheese, sour cream, or cheese    ? High-fat cuts of beef (T-bone steaks, ribs), chicken or turkey with skin, and organ meats such as liver    ? Butter, stick margarine, shortening, and cooking oils such as coconut or palm oil    · Foods and liquids high in sodium:      ? Packaged foods, such as frozen dinners, cookies, macaroni and cheese, and cereals with more than 300 mg of sodium per serving    ?  Vegetables with added sodium, such as instant potatoes, vegetables with added sauces, or regular canned vegetables    ? Cured or smoked meats, such as hot dogs, myers, and sausage    ? High-sodium ketchup, barbecue sauce, salad dressing, pickles, olives, soy sauce, or miso    · Foods and liquids high in sugar:      ? Candy, cake, cookies, pies, or doughnuts    ? Soft drinks (soda), sports drinks, or sweetened tea    ? Canned or dry mixes for cakes, soups, sauces, or gravies    Other healthy heart guidelines:   · Do not smoke  Nicotine and other chemicals in cigarettes and cigars can cause lung and heart damage  Ask your healthcare provider for information if you currently smoke and need help to quit  E-cigarettes or smokeless tobacco still contain nicotine  Talk to your healthcare provider before you use these products  · Limit or do not drink alcohol as directed  Alcohol can damage your heart and raise your blood pressure  Your healthcare provider may give you specific daily and weekly limits  The general recommended limit is 1 drink a day for women 21 or older and for men 72 or older  Do not have more than 3 drinks in a day or 7 in a week  The recommended limit is 2 drinks a day for men 24to 59years of age  Do not have more than 4 drinks in a day or 14 in a week  A drink of alcohol is 12 ounces of beer, 5 ounces of wine, or 1½ ounces of liquor  · Exercise regularly  Exercise can help you maintain a healthy weight and improve your blood pressure and cholesterol levels  Regular exercise can also decrease your risk for heart problems  Ask your healthcare provider about the best exercise plan for you  Do not start an exercise program without asking your healthcare provider  Follow up with your doctor or cardiologist as directed:  Write down your questions so you remember to ask them during your visits    © Copyright [employer] 2021 Information is for End User's use only and may not be sold, redistributed or otherwise used for commercial purposes  All illustrations and images included in CareNotes® are the copyrighted property of [employer]  or Evangelina Concepcion  The above information is an  only  It is not intended as medical advice for individual conditions or treatments  Talk to your doctor, nurse or pharmacist before following any medical regimen to see if it is safe and effective for you

## 2021-09-23 ENCOUNTER — APPOINTMENT (OUTPATIENT)
Dept: LAB | Facility: CLINIC | Age: 46
End: 2021-09-23
Payer: COMMERCIAL

## 2021-09-23 LAB
CHOLEST SERPL-MCNC: 174 MG/DL (ref 50–200)
HDLC SERPL-MCNC: 41 MG/DL
LDLC SERPL CALC-MCNC: 110 MG/DL (ref 0–100)
MEV IGG SER QL: NORMAL
MUV IGG SER QL: ABNORMAL
TRIGL SERPL-MCNC: 114 MG/DL

## 2021-09-23 PROCEDURE — 36415 COLL VENOUS BLD VENIPUNCTURE: CPT | Performed by: PHYSICIAN ASSISTANT

## 2021-09-23 PROCEDURE — 80061 LIPID PANEL: CPT | Performed by: PHYSICIAN ASSISTANT

## 2021-09-24 LAB
INDURATION: 0 MM
TB SKIN TEST: NEGATIVE

## 2021-09-27 ENCOUNTER — PATIENT OUTREACH (OUTPATIENT)
Dept: INTERNAL MEDICINE CLINIC | Facility: CLINIC | Age: 46
End: 2021-09-27

## 2021-09-27 NOTE — PROGRESS NOTES
[facility] received a referral in pt expressing interest in establishing OP [address] services  I attempted to reach pt today and requested a return call to [facility] provided call back information for myself and for on site [facility] as well  [facility] will attempt outreach on a later day  Update 9/28/2021- [facility] made second attempt to reach pt in regard to OP [address] services  [facility] will attempt outreach at a later time

## 2021-10-13 ENCOUNTER — TELEPHONE (OUTPATIENT)
Dept: GASTROENTEROLOGY | Facility: CLINIC | Age: 46
End: 2021-10-13

## 2021-10-19 ENCOUNTER — TELEPHONE (OUTPATIENT)
Dept: OBGYN CLINIC | Facility: CLINIC | Age: 46
End: 2021-10-19

## 2021-10-19 PROBLEM — Z02.89 ENCOUNTER FOR PHYSICAL EXAMINATION RELATED TO EMPLOYMENT: Chronic | Status: RESOLVED | Noted: 2019-08-19 | Resolved: 2021-10-19

## 2021-10-19 PROBLEM — Z00.00 WELL ADULT EXAM: Status: RESOLVED | Noted: 2019-08-19 | Resolved: 2021-10-19

## 2021-10-19 PROBLEM — Z12.11 COLON CANCER SCREENING: Status: RESOLVED | Noted: 2018-02-13 | Resolved: 2021-10-19

## 2021-11-02 ENCOUNTER — ANNUAL EXAM (OUTPATIENT)
Dept: OBGYN CLINIC | Facility: CLINIC | Age: 46
End: 2021-11-02
Payer: COMMERCIAL

## 2021-11-02 VITALS
DIASTOLIC BLOOD PRESSURE: 80 MMHG | WEIGHT: 237 LBS | SYSTOLIC BLOOD PRESSURE: 122 MMHG | BODY MASS INDEX: 40.46 KG/M2 | HEIGHT: 64 IN

## 2021-11-02 DIAGNOSIS — L90.0 LICHEN SCLEROSUS: ICD-10-CM

## 2021-11-02 DIAGNOSIS — Z01.419 ENCOUNTER FOR GYNECOLOGICAL EXAMINATION WITHOUT ABNORMAL FINDING: Primary | ICD-10-CM

## 2021-11-02 PROCEDURE — 99386 PREV VISIT NEW AGE 40-64: CPT | Performed by: PHYSICIAN ASSISTANT

## 2021-11-02 PROCEDURE — G0476 HPV COMBO ASSAY CA SCREEN: HCPCS | Performed by: PHYSICIAN ASSISTANT

## 2021-11-02 PROCEDURE — G0145 SCR C/V CYTO,THINLAYER,RESCR: HCPCS | Performed by: PATHOLOGY

## 2021-11-02 PROCEDURE — G0124 SCREEN C/V THIN LAYER BY MD: HCPCS | Performed by: PATHOLOGY

## 2021-11-02 RX ORDER — CLOBETASOL PROPIONATE 0.5 MG/G
OINTMENT TOPICAL 2 TIMES DAILY
Qty: 45 G | Refills: 1 | Status: SHIPPED | OUTPATIENT
Start: 2021-11-02 | End: 2022-02-08 | Stop reason: SDUPTHER

## 2021-11-03 ENCOUNTER — PATIENT OUTREACH (OUTPATIENT)
Dept: INTERNAL MEDICINE CLINIC | Facility: CLINIC | Age: 46
End: 2021-11-03

## 2021-11-05 LAB
HPV HR 12 DNA CVX QL NAA+PROBE: POSITIVE
HPV16 DNA CVX QL NAA+PROBE: NEGATIVE
HPV18 DNA CVX QL NAA+PROBE: NEGATIVE

## 2021-11-09 ENCOUNTER — TELEPHONE (OUTPATIENT)
Dept: OBGYN CLINIC | Facility: CLINIC | Age: 46
End: 2021-11-09

## 2021-11-09 DIAGNOSIS — B96.89 BV (BACTERIAL VAGINOSIS): Primary | ICD-10-CM

## 2021-11-09 DIAGNOSIS — N76.0 BV (BACTERIAL VAGINOSIS): Primary | ICD-10-CM

## 2021-11-09 LAB
LAB AP GYN PRIMARY INTERPRETATION: ABNORMAL
Lab: ABNORMAL
PATH INTERP SPEC-IMP: ABNORMAL

## 2021-11-09 RX ORDER — METRONIDAZOLE 500 MG/1
500 TABLET ORAL EVERY 12 HOURS SCHEDULED
Qty: 14 TABLET | Refills: 0 | Status: SHIPPED | OUTPATIENT
Start: 2021-11-09 | End: 2021-11-16

## 2021-11-17 ENCOUNTER — OFFICE VISIT (OUTPATIENT)
Dept: GASTROENTEROLOGY | Facility: CLINIC | Age: 46
End: 2021-11-17
Payer: COMMERCIAL

## 2021-11-17 VITALS
DIASTOLIC BLOOD PRESSURE: 84 MMHG | SYSTOLIC BLOOD PRESSURE: 133 MMHG | HEART RATE: 77 BPM | HEIGHT: 64 IN | TEMPERATURE: 98.1 F | BODY MASS INDEX: 41.15 KG/M2 | WEIGHT: 241 LBS

## 2021-11-17 DIAGNOSIS — Z80.0 FAMILY HISTORY OF COLON CANCER: Primary | ICD-10-CM

## 2021-11-17 DIAGNOSIS — Z12.11 COLON CANCER SCREENING: ICD-10-CM

## 2021-11-17 PROCEDURE — 3008F BODY MASS INDEX DOCD: CPT | Performed by: INTERNAL MEDICINE

## 2021-11-17 PROCEDURE — 1036F TOBACCO NON-USER: CPT | Performed by: INTERNAL MEDICINE

## 2021-11-17 PROCEDURE — 99204 OFFICE O/P NEW MOD 45 MIN: CPT | Performed by: INTERNAL MEDICINE

## 2021-11-22 ENCOUNTER — PROCEDURE VISIT (OUTPATIENT)
Dept: OBGYN CLINIC | Facility: CLINIC | Age: 46
End: 2021-11-22
Payer: COMMERCIAL

## 2021-11-22 VITALS — BODY MASS INDEX: 41.02 KG/M2 | DIASTOLIC BLOOD PRESSURE: 82 MMHG | WEIGHT: 239 LBS | SYSTOLIC BLOOD PRESSURE: 118 MMHG

## 2021-11-22 DIAGNOSIS — R87.810 ASCUS WITH POSITIVE HIGH RISK HPV CERVICAL: Primary | ICD-10-CM

## 2021-11-22 DIAGNOSIS — R87.610 ASCUS WITH POSITIVE HIGH RISK HPV CERVICAL: Primary | ICD-10-CM

## 2021-11-22 PROCEDURE — 88305 TISSUE EXAM BY PATHOLOGIST: CPT | Performed by: PATHOLOGY

## 2021-11-22 PROCEDURE — 88344 IMHCHEM/IMCYTCHM EA MLT ANTB: CPT | Performed by: PATHOLOGY

## 2021-11-22 PROCEDURE — 57454 BX/CURETT OF CERVIX W/SCOPE: CPT | Performed by: OBSTETRICS & GYNECOLOGY

## 2021-12-02 ENCOUNTER — TELEPHONE (OUTPATIENT)
Dept: OBGYN CLINIC | Facility: CLINIC | Age: 46
End: 2021-12-02

## 2021-12-03 ENCOUNTER — TRANSCRIBE ORDERS (OUTPATIENT)
Dept: OBGYN CLINIC | Facility: CLINIC | Age: 46
End: 2021-12-03

## 2021-12-03 ENCOUNTER — OFFICE VISIT (OUTPATIENT)
Dept: OBGYN CLINIC | Facility: CLINIC | Age: 46
End: 2021-12-03
Payer: COMMERCIAL

## 2021-12-03 VITALS — BODY MASS INDEX: 41.88 KG/M2 | WEIGHT: 244 LBS | DIASTOLIC BLOOD PRESSURE: 82 MMHG | SYSTOLIC BLOOD PRESSURE: 126 MMHG

## 2021-12-03 DIAGNOSIS — R87.613 HIGH GRADE SQUAMOUS INTRAEPITHELIAL CERVICAL DYSPLASIA: Primary | ICD-10-CM

## 2021-12-03 DIAGNOSIS — N87.1 HIGH GRADE SQUAMOUS INTRAEPITHELIAL LESION (HGSIL), GRADE 2 CIN, ON BIOPSY OF CERVIX: Primary | ICD-10-CM

## 2021-12-03 PROCEDURE — 99213 OFFICE O/P EST LOW 20 MIN: CPT | Performed by: OBSTETRICS & GYNECOLOGY

## 2021-12-06 ENCOUNTER — TELEPHONE (OUTPATIENT)
Dept: HEMATOLOGY ONCOLOGY | Facility: CLINIC | Age: 46
End: 2021-12-06

## 2021-12-14 ENCOUNTER — CONSULT (OUTPATIENT)
Dept: GYNECOLOGIC ONCOLOGY | Facility: CLINIC | Age: 46
End: 2021-12-14
Payer: COMMERCIAL

## 2021-12-14 VITALS
HEART RATE: 71 BPM | WEIGHT: 240 LBS | OXYGEN SATURATION: 98 % | DIASTOLIC BLOOD PRESSURE: 74 MMHG | HEIGHT: 64 IN | BODY MASS INDEX: 40.97 KG/M2 | TEMPERATURE: 98.4 F | SYSTOLIC BLOOD PRESSURE: 122 MMHG | RESPIRATION RATE: 17 BRPM

## 2021-12-14 DIAGNOSIS — R87.613 HIGH GRADE SQUAMOUS INTRAEPITHELIAL CERVICAL DYSPLASIA: ICD-10-CM

## 2021-12-14 DIAGNOSIS — D06.1 CARCINOMA IN SITU OF EXOCERVIX: Primary | ICD-10-CM

## 2021-12-14 PROBLEM — D06.9 CIN III (CERVICAL INTRAEPITHELIAL NEOPLASIA III): Status: ACTIVE | Noted: 2021-12-14

## 2021-12-14 PROCEDURE — 99203 OFFICE O/P NEW LOW 30 MIN: CPT | Performed by: OBSTETRICS & GYNECOLOGY

## 2021-12-14 PROCEDURE — 3008F BODY MASS INDEX DOCD: CPT | Performed by: OBSTETRICS & GYNECOLOGY

## 2021-12-14 PROCEDURE — 1036F TOBACCO NON-USER: CPT | Performed by: OBSTETRICS & GYNECOLOGY

## 2022-01-04 ENCOUNTER — APPOINTMENT (OUTPATIENT)
Dept: LAB | Facility: HOSPITAL | Age: 47
End: 2022-01-04
Payer: MEDICARE

## 2022-01-04 ENCOUNTER — TELEPHONE (OUTPATIENT)
Dept: GYNECOLOGIC ONCOLOGY | Facility: CLINIC | Age: 47
End: 2022-01-04

## 2022-01-04 ENCOUNTER — OFFICE VISIT (OUTPATIENT)
Dept: LAB | Facility: HOSPITAL | Age: 47
End: 2022-01-04
Attending: OBSTETRICS & GYNECOLOGY
Payer: MEDICARE

## 2022-01-04 DIAGNOSIS — R73.9 ELEVATED SERUM GLUCOSE: ICD-10-CM

## 2022-01-04 DIAGNOSIS — R87.613 HIGH GRADE SQUAMOUS INTRAEPITHELIAL CERVICAL DYSPLASIA: ICD-10-CM

## 2022-01-04 LAB
ANION GAP SERPL CALCULATED.3IONS-SCNC: 3 MMOL/L (ref 4–13)
ATRIAL RATE: 65 BPM
BUN SERPL-MCNC: 9 MG/DL (ref 5–25)
CALCIUM SERPL-MCNC: 8.7 MG/DL (ref 8.3–10.1)
CHLORIDE SERPL-SCNC: 107 MMOL/L (ref 100–108)
CO2 SERPL-SCNC: 26 MMOL/L (ref 21–32)
CREAT SERPL-MCNC: 0.66 MG/DL (ref 0.6–1.3)
ERYTHROCYTE [DISTWIDTH] IN BLOOD BY AUTOMATED COUNT: 14.6 % (ref 11.6–15.1)
GFR SERPL CREATININE-BSD FRML MDRD: 106 ML/MIN/1.73SQ M
GLUCOSE P FAST SERPL-MCNC: 95 MG/DL (ref 65–99)
HCT VFR BLD AUTO: 43.4 % (ref 34.8–46.1)
HGB BLD-MCNC: 13 G/DL (ref 11.5–15.4)
MCH RBC QN AUTO: 24.8 PG (ref 26.8–34.3)
MCHC RBC AUTO-ENTMCNC: 30 G/DL (ref 31.4–37.4)
MCV RBC AUTO: 83 FL (ref 82–98)
P AXIS: 26 DEGREES
PLATELET # BLD AUTO: 323 THOUSANDS/UL (ref 149–390)
PMV BLD AUTO: 11.3 FL (ref 8.9–12.7)
POTASSIUM SERPL-SCNC: 3.9 MMOL/L (ref 3.5–5.3)
PR INTERVAL: 114 MS
QRS AXIS: -4 DEGREES
QRSD INTERVAL: 92 MS
QT INTERVAL: 386 MS
QTC INTERVAL: 401 MS
RBC # BLD AUTO: 5.24 MILLION/UL (ref 3.81–5.12)
SODIUM SERPL-SCNC: 136 MMOL/L (ref 136–145)
T WAVE AXIS: 6 DEGREES
VENTRICULAR RATE: 65 BPM
WBC # BLD AUTO: 7.63 THOUSAND/UL (ref 4.31–10.16)

## 2022-01-04 PROCEDURE — 93010 ELECTROCARDIOGRAM REPORT: CPT | Performed by: INTERNAL MEDICINE

## 2022-01-04 PROCEDURE — 80048 BASIC METABOLIC PNL TOTAL CA: CPT

## 2022-01-04 PROCEDURE — 93005 ELECTROCARDIOGRAM TRACING: CPT

## 2022-01-04 PROCEDURE — 36415 COLL VENOUS BLD VENIPUNCTURE: CPT

## 2022-01-04 PROCEDURE — 85027 COMPLETE CBC AUTOMATED: CPT

## 2022-01-04 NOTE — TELEPHONE ENCOUNTER
Phone call to patient to let her know that per Dr Bo Mayorga, she needs to see her PCP for clearance prior to her surgery due to her abnormal EKG  Patient indicates understanding  She will call their office to schedule an appointment  Advised that they can call us with any questions and they will be able to see everything since they are part of [facility]

## 2022-01-12 ENCOUNTER — TELEPHONE (OUTPATIENT)
Dept: GASTROENTEROLOGY | Facility: CLINIC | Age: 47
End: 2022-01-12

## 2022-01-13 ENCOUNTER — CONSULT (OUTPATIENT)
Dept: INTERNAL MEDICINE CLINIC | Facility: CLINIC | Age: 47
End: 2022-01-13

## 2022-01-13 VITALS
WEIGHT: 242 LBS | BODY MASS INDEX: 40.32 KG/M2 | DIASTOLIC BLOOD PRESSURE: 77 MMHG | SYSTOLIC BLOOD PRESSURE: 109 MMHG | HEIGHT: 65 IN | TEMPERATURE: 98 F | HEART RATE: 69 BPM

## 2022-01-13 DIAGNOSIS — D06.1 CARCINOMA IN SITU OF EXOCERVIX: ICD-10-CM

## 2022-01-13 DIAGNOSIS — Z01.818 PREOPERATIVE CLEARANCE: Primary | ICD-10-CM

## 2022-01-13 DIAGNOSIS — R94.31 ABNORMAL EKG: ICD-10-CM

## 2022-01-13 PROCEDURE — 3008F BODY MASS INDEX DOCD: CPT | Performed by: OBSTETRICS & GYNECOLOGY

## 2022-01-13 PROCEDURE — 99212 OFFICE O/P EST SF 10 MIN: CPT | Performed by: PHYSICIAN ASSISTANT

## 2022-01-13 NOTE — PROGRESS NOTES
Assessment/Plan:      Diagnoses and all orders for this visit:    Preoperative clearance    Carcinoma in situ of exocervix    Abnormal EKG      54-year-old presenting today for preoperative clearance for cervical procedure as a result of High grade squamous intraepithelial cervical dysplasia  Details as in HPI  Patient referred to PCP for preop consultation secondary to abnormal EKG as part of preop testing  EKG reviewed personally and with attending physician reading incomplete bundle branch block and poor R-wave progression  We do not feel this is the case and could be an over read also secondary to patient body habitus  There is no EKG to compare  She has no prior cardiac history  She does have mild elevated LDL with recent lipid showing LDL of 110  She has no cardiovascular respiratory symptoms and has felt well otherwise  She does not smoke and does not drink alcohol  Blood pressure today 109/77  At this time I do not feel surgical delay is necessary and I do not feel any further testing is necessary after her procedure  I explained this to patient she expresses understanding  Otherwise the rest of her examination unremarkable, she is asymptomatic  At this time she is considered low risk for surgical complication and is cleared for her procedure as planned  Dr Salazar West notified via [facility] Text as well  Chief Complaint   Patient presents with    Pre-op Exam     cervix surgery       Subjective:     Patient ID: Gabby Muro is a 55 y o  female      Gabby Muro  55 y o   female    SURGEON: Dr Gentile [place]: Cold knife cone - abnormal PAP    DATE OF SURGERY: 2022    PRIOR SURGERIES:Past Surgical History:  No date: BREAST SURGERY      Comment:  Lumpectomy  No date:  SECTION  No date: LAPAROSCOPY FOR ECTOPIC PREGNANCY  No date: TUBAL LIGATION     PRIOR ANESTHESIA:YES    COMPLICATION: No     BLEEDING PROBLEM: no    PERTINENT PMH: yes - anxiety/depression, bipolar and PTSD  No medications - stable  EXERCISE CAPACITY:   CAN WALK 4 BLOCKS AND OR CLIMB 2 FLIGHTS: Yes    HOME LIVING SITUATION SAFE AND SECURE: Yes      TOBACCO: no     ETOH: no     ILLEGAL DRUGS: no    Over past 2 weeks, she denies cp, SOB or exertional sxs, HA's, dizziness/lightheadedness  Denies diarrhea, abd pain, blood in stool  Denies N/V, coughing or spitting up blood  Denies fever or chills  Denies sudden weight loss or loss of appetite  Deenis any ruainry sxs  Mother with recent MI age 79, also notes bnorn with hole in her heart  Pt herself denies any personal hx of cardiac disease  Review of Systems   Constitutional: Negative  HENT: Negative  Respiratory: Negative  Cardiovascular: Negative  Gastrointestinal: Negative  Genitourinary: Negative  Musculoskeletal: Negative  Skin: Negative  Neurological: Negative  Psychiatric/Behavioral: Negative  The following portions of the patient's history were reviewed and updated as appropriate: allergies, current medications, past family history, past medical history, past social history, past surgical history and problem list       Objective:     Physical Exam  Vitals reviewed  Constitutional:       General: She is not in acute distress  Appearance: She is obese  She is not ill-appearing or toxic-appearing  Comments: BMI 39 96   HENT:      Head: Normocephalic and atraumatic  Right Ear: Tympanic membrane, ear canal and external ear normal       Left Ear: Tympanic membrane, ear canal and external ear normal    Eyes:      Conjunctiva/sclera: Conjunctivae normal    Neck:      Vascular: No carotid bruit  Cardiovascular:      Rate and Rhythm: Normal rate and regular rhythm  Pulses:           Carotid pulses are 2+ on the right side and 2+ on the left side  Radial pulses are 2+ on the right side and 2+ on the left side          Posterior tibial pulses are 2+ on the right side and 2+ on the left side  Heart sounds: Normal heart sounds  Pulmonary:      Effort: Pulmonary effort is normal       Breath sounds: Normal breath sounds  Abdominal:      General: Abdomen is protuberant  Bowel sounds are normal       Palpations: Abdomen is soft  Tenderness: There is no abdominal tenderness  Musculoskeletal:      Cervical back: Neck supple  Right lower leg: No edema  Left lower leg: No edema  Comments: Gross normal appearance and movement of extremities and spine without any obvious deformity, pain or limitation  Lymphadenopathy:      Head:      Right side of head: No submandibular or tonsillar adenopathy  Left side of head: No submandibular or tonsillar adenopathy  Neurological:      Mental Status: She is alert and oriented to person, place, and time  Psychiatric:         Mood and Affect: Mood normal          Speech: Speech normal          Behavior: Behavior normal  Behavior is cooperative             Vitals:    01/13/22 1114   BP: 109/77   BP Location: Left arm   Patient Position: Sitting   Cuff Size: Large   Pulse: 69   Temp: 98 °F (36 7 °C)   TempSrc: Temporal   Weight: 110 kg (242 lb)   Height: 5' 5 25" (1 657 m)

## 2022-01-18 NOTE — PRE-PROCEDURE INSTRUCTIONS
Pre-Surgery Instructions:   Medication Instructions    clobetasol (TEMOVATE) 0 05 % ointment Instructed patient per Anesthesia Guidelines  Do Not apply day of surgery   Covid screening negative as per patient  Fully vaccinated  Reviewed showering and medication instructions  Patient verbalized understanding  Advised NPO after MN and ASC will call with scheduled surgical time

## 2022-01-19 ENCOUNTER — ANESTHESIA EVENT (OUTPATIENT)
Dept: PERIOP | Facility: HOSPITAL | Age: 47
End: 2022-01-19
Payer: MEDICARE

## 2022-01-19 ENCOUNTER — HOSPITAL ENCOUNTER (OUTPATIENT)
Dept: RADIOLOGY | Age: 47
Discharge: HOME | End: 2022-01-19
Payer: MEDICARE

## 2022-01-19 VITALS — WEIGHT: 242 LBS | HEIGHT: 65 IN | BODY MASS INDEX: 40.32 KG/M2

## 2022-01-19 DIAGNOSIS — Z12.31 ENCOUNTER FOR SCREENING MAMMOGRAM FOR MALIGNANT NEOPLASM OF BREAST: ICD-10-CM

## 2022-01-19 PROCEDURE — 77063 BREAST TOMOSYNTHESIS BI: CPT

## 2022-01-19 PROCEDURE — 77067 SCR MAMMO BI INCL CAD: CPT

## 2022-01-20 ENCOUNTER — HOSPITAL ENCOUNTER (OUTPATIENT)
Facility: HOSPITAL | Age: 47
Setting detail: OUTPATIENT SURGERY
Discharge: HOME | End: 2022-01-20
Attending: OBSTETRICS & GYNECOLOGY | Admitting: OBSTETRICS & GYNECOLOGY
Payer: MEDICARE

## 2022-01-20 ENCOUNTER — ANESTHESIA (OUTPATIENT)
Dept: PERIOP | Facility: HOSPITAL | Age: 47
End: 2022-01-20
Payer: MEDICARE

## 2022-01-20 VITALS
HEIGHT: 65 IN | HEART RATE: 70 BPM | BODY MASS INDEX: 39.99 KG/M2 | RESPIRATION RATE: 18 BRPM | DIASTOLIC BLOOD PRESSURE: 73 MMHG | OXYGEN SATURATION: 99 % | SYSTOLIC BLOOD PRESSURE: 130 MMHG | WEIGHT: 240 LBS | TEMPERATURE: 97.6 F

## 2022-01-20 DIAGNOSIS — R87.613 HIGH GRADE SQUAMOUS INTRAEPITHELIAL CERVICAL DYSPLASIA: ICD-10-CM

## 2022-01-20 PROCEDURE — 88307 TISSUE EXAM BY PATHOLOGIST: CPT | Performed by: SPECIALIST

## 2022-01-20 PROCEDURE — 88305 TISSUE EXAM BY PATHOLOGIST: CPT | Performed by: SPECIALIST

## 2022-01-20 PROCEDURE — 57520 CONIZATION OF CERVIX: CPT | Performed by: OBSTETRICS & GYNECOLOGY

## 2022-01-20 PROCEDURE — 88344 IMHCHEM/IMCYTCHM EA MLT ANTB: CPT | Performed by: SPECIALIST

## 2022-01-20 PROCEDURE — 99024 POSTOP FOLLOW-UP VISIT: CPT | Performed by: OBSTETRICS & GYNECOLOGY

## 2022-01-20 PROCEDURE — 56605 BIOPSY OF VULVA/PERINEUM: CPT | Performed by: OBSTETRICS & GYNECOLOGY

## 2022-01-20 RX ORDER — LIDOCAINE HYDROCHLORIDE 10 MG/ML
INJECTION, SOLUTION EPIDURAL; INFILTRATION; INTRACAUDAL; PERINEURAL AS NEEDED
Status: DISCONTINUED | OUTPATIENT
Start: 2022-01-20 | End: 2022-01-20

## 2022-01-20 RX ORDER — SODIUM CHLORIDE, SODIUM LACTATE, POTASSIUM CHLORIDE, CALCIUM CHLORIDE 600; 310; 30; 20 MG/100ML; MG/100ML; MG/100ML; MG/100ML
INJECTION, SOLUTION INTRAVENOUS CONTINUOUS PRN
Status: DISCONTINUED | OUTPATIENT
Start: 2022-01-20 | End: 2022-01-20

## 2022-01-20 RX ORDER — ACETAMINOPHEN 325 MG/1
975 TABLET ORAL EVERY 6 HOURS PRN
Status: DISCONTINUED | OUTPATIENT
Start: 2022-01-20 | End: 2022-01-20 | Stop reason: HOSPADM

## 2022-01-20 RX ORDER — ONDANSETRON 2 MG/ML
4 INJECTION INTRAMUSCULAR; INTRAVENOUS EVERY 4 HOURS PRN
Status: DISCONTINUED | OUTPATIENT
Start: 2022-01-20 | End: 2022-01-20 | Stop reason: HOSPADM

## 2022-01-20 RX ORDER — ONDANSETRON 2 MG/ML
4 INJECTION INTRAMUSCULAR; INTRAVENOUS ONCE AS NEEDED
Status: DISCONTINUED | OUTPATIENT
Start: 2022-01-20 | End: 2022-01-20 | Stop reason: HOSPADM

## 2022-01-20 RX ORDER — DEXAMETHASONE SODIUM PHOSPHATE 10 MG/ML
INJECTION, SOLUTION INTRAMUSCULAR; INTRAVENOUS AS NEEDED
Status: DISCONTINUED | OUTPATIENT
Start: 2022-01-20 | End: 2022-01-20

## 2022-01-20 RX ORDER — PROMETHAZINE HYDROCHLORIDE 25 MG/ML
25 INJECTION, SOLUTION INTRAMUSCULAR; INTRAVENOUS ONCE AS NEEDED
Status: DISCONTINUED | OUTPATIENT
Start: 2022-01-20 | End: 2022-01-20 | Stop reason: HOSPADM

## 2022-01-20 RX ORDER — OXYCODONE HYDROCHLORIDE 5 MG/1
5 TABLET ORAL EVERY 4 HOURS PRN
Status: DISCONTINUED | OUTPATIENT
Start: 2022-01-20 | End: 2022-01-20 | Stop reason: HOSPADM

## 2022-01-20 RX ORDER — MAGNESIUM HYDROXIDE 1200 MG/15ML
LIQUID ORAL AS NEEDED
Status: DISCONTINUED | OUTPATIENT
Start: 2022-01-20 | End: 2022-01-20 | Stop reason: HOSPADM

## 2022-01-20 RX ORDER — FENTANYL CITRATE/PF 50 MCG/ML
25 SYRINGE (ML) INJECTION
Status: DISCONTINUED | OUTPATIENT
Start: 2022-01-20 | End: 2022-01-20 | Stop reason: HOSPADM

## 2022-01-20 RX ORDER — MIDAZOLAM HYDROCHLORIDE 2 MG/2ML
INJECTION, SOLUTION INTRAMUSCULAR; INTRAVENOUS AS NEEDED
Status: DISCONTINUED | OUTPATIENT
Start: 2022-01-20 | End: 2022-01-20

## 2022-01-20 RX ORDER — FENTANYL CITRATE 50 UG/ML
INJECTION, SOLUTION INTRAMUSCULAR; INTRAVENOUS AS NEEDED
Status: DISCONTINUED | OUTPATIENT
Start: 2022-01-20 | End: 2022-01-20

## 2022-01-20 RX ORDER — IODINE SOLUTION STRONG 5% (LUGOL'S) 5 %
SOLUTION ORAL AS NEEDED
Status: DISCONTINUED | OUTPATIENT
Start: 2022-01-20 | End: 2022-01-20 | Stop reason: HOSPADM

## 2022-01-20 RX ORDER — MEPERIDINE HYDROCHLORIDE 25 MG/ML
12.5 INJECTION INTRAMUSCULAR; INTRAVENOUS; SUBCUTANEOUS
Status: DISCONTINUED | OUTPATIENT
Start: 2022-01-20 | End: 2022-01-20 | Stop reason: HOSPADM

## 2022-01-20 RX ORDER — LIDOCAINE HYDROCHLORIDE AND EPINEPHRINE 10; 10 MG/ML; UG/ML
INJECTION, SOLUTION INFILTRATION; PERINEURAL AS NEEDED
Status: DISCONTINUED | OUTPATIENT
Start: 2022-01-20 | End: 2022-01-20 | Stop reason: HOSPADM

## 2022-01-20 RX ORDER — SODIUM CHLORIDE, SODIUM LACTATE, POTASSIUM CHLORIDE, CALCIUM CHLORIDE 600; 310; 30; 20 MG/100ML; MG/100ML; MG/100ML; MG/100ML
100 INJECTION, SOLUTION INTRAVENOUS CONTINUOUS
Status: DISCONTINUED | OUTPATIENT
Start: 2022-01-20 | End: 2022-01-20 | Stop reason: HOSPADM

## 2022-01-20 RX ORDER — PROPOFOL 10 MG/ML
INJECTION, EMULSION INTRAVENOUS AS NEEDED
Status: DISCONTINUED | OUTPATIENT
Start: 2022-01-20 | End: 2022-01-20

## 2022-01-20 RX ADMIN — LIDOCAINE HYDROCHLORIDE 50 MG: 10 INJECTION, SOLUTION EPIDURAL; INFILTRATION; INTRACAUDAL at 13:43

## 2022-01-20 RX ADMIN — DEXAMETHASONE SODIUM PHOSPHATE 4 MG: 10 INJECTION, SOLUTION INTRAMUSCULAR; INTRAVENOUS at 13:56

## 2022-01-20 RX ADMIN — ONDANSETRON 4 MG: 2 INJECTION INTRAMUSCULAR; INTRAVENOUS at 13:56

## 2022-01-20 RX ADMIN — FENTANYL CITRATE 50 MCG: 50 INJECTION, SOLUTION INTRAMUSCULAR; INTRAVENOUS at 13:49

## 2022-01-20 RX ADMIN — SODIUM CHLORIDE, SODIUM LACTATE, POTASSIUM CHLORIDE, AND CALCIUM CHLORIDE: .6; .31; .03; .02 INJECTION, SOLUTION INTRAVENOUS at 13:38

## 2022-01-20 RX ADMIN — PROPOFOL 250 MG: 10 INJECTION, EMULSION INTRAVENOUS at 13:43

## 2022-01-20 RX ADMIN — MIDAZOLAM HYDROCHLORIDE 2 MG: 1 INJECTION, SOLUTION INTRAMUSCULAR; INTRAVENOUS at 13:36

## 2022-01-20 RX ADMIN — FENTANYL CITRATE 50 MCG: 50 INJECTION, SOLUTION INTRAMUSCULAR; INTRAVENOUS at 14:01

## 2022-01-20 RX ADMIN — ACETAMINOPHEN 975 MG: 325 TABLET, FILM COATED ORAL at 15:02

## 2022-01-20 NOTE — H&P
H&P Exam - Gynecology Oncology  Luna Cordero 55 y o  female MRN: [id number]  Unit/Bed#: OR [place] Encounter: [id number]        Assessment/Plan    51yo with PTSD/depression, obesity, lichen sclerosis and CIN3 presents for surgical intervention  Previously discussed risks and benefits fof proceeding  Agrees to proceed with definitive management  Post op expectations and recovery discussed  History of Present Illness     HPI:  Luna Cordero is a 55 y o  female who presents for surgical intervention  No complaints at this time  Oncology History    No history exists  Review of Systems   Constitutional: Negative for appetite change, chills, fatigue and fever  Respiratory: Negative for chest tightness and shortness of breath  Gastrointestinal: Negative for abdominal distention, abdominal pain, constipation, diarrhea and nausea  Genitourinary: Negative for difficulty urinating, flank pain, frequency, urgency, vaginal bleeding, vaginal discharge and vaginal pain  Musculoskeletal: Negative for back pain, joint swelling and myalgias  Skin: Negative for rash  Neurological: Negative for dizziness, light-headedness, numbness and headaches         Historical Information   Past Medical History:   Diagnosis Date    Anemia     Last assessed 2014    Anxiety     Bipolar 1 disorder ([facility] [address] )     Class 2 obesity due to excess calories without serious comorbidity with body mass index (BMI) of 37 0 to 37 9 in adult 2019    Depression     Diastasis recti     Last assessed 2017    Migraine     Last assessed 2014    Obesity (BMI 35 0-39 9 without comorbidity) 2018    PTSD (post-traumatic stress disorder)      Past Surgical History:   Procedure Laterality Date    BREAST EXCISIONAL BIOPSY Left     age 12   [place] BREAST SURGERY      Lumpectomy     SECTION      LAPAROSCOPY FOR ECTOPIC PREGNANCY      TUBAL LIGATION       OB History    Para Term  AB Living   5 4 4   1 4   SAB IAB Ectopic Multiple Live Births   1              # Outcome Date GA Lbr Harris/2nd Weight Sex Delivery Anes PTL Lv   5 SAB            4 Term            3 Term            2 Term            1 Term              Family History   Problem Relation Age of Onset    Diabetes Mother     Hyperlipidemia Mother     Hypertension Mother     Ovarian cancer Mother 76    Alcohol abuse Father     Substance Abuse Father     Colon cancer Sister 39    Leukemia Maternal Grandmother     Breast cancer Maternal Aunt         unknown age   [place] No Known Problems Maternal Grandfather     No Known Problems Paternal Grandmother     No Known Problems Paternal Grandfather     No Known Problems Son     No Known Problems Son     No Known Problems Son     No Known Problems Son     No Known Problems Sister    [place] Breast cancer Maternal Aunt         unknown age   [place] Breast cancer Maternal Aunt         unknown age   [place] Breast cancer Maternal Aunt         unknown age   [place] Uterine cancer Maternal Aunt         unknonw age   [place] No Known Problems Paternal Aunt     No Known Problems Paternal Aunt     No Known Problems Paternal Aunt     Testicular cancer Cousin 29     Social History   Social History     Substance and Sexual Activity   Alcohol Use Yes    Comment: Rarely     Social History     Substance and Sexual Activity   Drug Use Never     Social History     Tobacco Use   Smoking Status Never Smoker   Smokeless Tobacco Never Used       Meds/Allergies   Medications Prior to Admission   Medication    clobetasol (TEMOVATE) 0 05 % ointment     Allergies   Allergen Reactions    Pollen Extract        Objective     /79   Pulse 77   Temp (!) 97 4 °F (36 3 °C) (Temporal)   Resp 16   Ht 5' 5" (1 651 m)   Wt 109 kg (240 lb)   LMP 12/17/2021 (Exact Date)   SpO2 100%   BMI 39 94 kg/m²     No intake/output data recorded  No intake/output data recorded      Lab Results   Component Value Date    WBC 7 63 01/04/2022    HGB 13 0 01/04/2022    HCT 43 4 01/04/2022    MCV 83 01/04/2022     01/04/2022       Lab Results   Component Value Date    GLUCOSE 91 11/19/2014    CALCIUM 8 7 01/04/2022     11/19/2014    K 3 9 01/04/2022    CO2 26 01/04/2022     01/04/2022    BUN 9 01/04/2022    CREATININE 0 66 01/04/2022             Code Status: No Order    Alta Gutierrez MD  1/20/2022  1:02 PM

## 2022-01-20 NOTE — OP NOTE
PERATIVE REPORT  PATIENT NAME: Prisca Wild    :  1975  MRN: [id number]  Pt Location: AN OR ROOM 04    SURGERY DATE: 2022    Surgeon(s) and Role:     * Merly Syed MD - Primary     * Paulie Powell MD - Assisting    Preop Diagnosis:  High grade squamous intraepithelial cervical dysplasia [R87 613]    Post-Op Diagnosis Codes:     * High grade squamous intraepithelial cervical dysplasia [R87 613]    Procedure(s) (LRB):  COLD KNIFE CONE AND VULVAR BIOPSIES (N/A)    Specimen(s):  ID Type Source Tests Collected by Time Destination   1 : CERVICAL CONE STITCH AT 12:00 O'CLOCK  Tissue Cervix TISSUE EXAM Merly Syed MD 2022  2:03 PM    2 : ENDOCERVICAL CURETTINGS Tissue Soft Tissue, Other TISSUE EXAM Merly Syed MD 2022  2:08 PM    3 : CLITTORAL GONZALEZ  BIOPSY Tissue Soft Tissue, Other TISSUE EXAM Merly Syed MD 2022  2:09 PM    4 : PERINEAL BIOPSY Tissue Soft Tissue, Other TISSUE EXAM Merly Syed MD 2022  2:11 PM        Estimated Blood Loss:   Minimal    Drains:  * No LDAs found *    Anesthesia Type:   General    Operative Indications:  High grade squamous intraepithelial cervical dysplasia [R87 613]  45yo with hx of abnormal pap for which she underwent colposcopy revealing CIN3 at the 1 oclock position  She has a good understanding and has agreed to proceed with definitive surgical management  Operative Findings:  Extensive lichen sclerosis like changes throughout inner labia majora from mons to perineum  Given long standing history, biopsies were taken of the vulva  Lugols applied to cervix and vagina and no lesions were visualized  Complications:   None    Procedure and Technique:  The patient was met in the preoperative area where history and physical were reviewed, consents were reviewed, and all questions were answered  The patient was then taken to the operating room by anesthesia where general anesthesia was performed without difficulty   The patient was then placed in the dorsal lithotomy position and an exam under anesthesia was performed with the above-noted findings noted  A patient safety time-out was performed with all members of the operating room team present  The patient was then prepped and draped in the usual sterile fashion  Attention was directed to the perineum where the cervix was easily visualized with Sanders and Nayely retractors and grasped at the anterior lip with a single-tooth tenaculum  Lugol's solution was applied to the cervix  The cervix was then injected with 1% lidocaine with 1:100,000 epinephrine for anesthesia and hemostasis  A cone biopsy was performed in the usual fashion with a scalpel  An endocervical currettage was then performed  The cone bed was then cauterized with Bovie cautery and Monsel's solution was applied  Fibrillar was placed for complete hemostasis  The tenaculum was removed and the tenaculum site was also noted to be hemostatic  Clitoral bryant was biopsied as well as the perineum using forceps and a scalpel  Single figure of eight 3-0 vicryl was placed for hemostasis at these 2 points  At this point, the patient was taken out of dorsal lithotomy position  Anesthesia was reversed without difficulty  Lap, sponge, needle, and instrument counts were correct x2  The patient was taken to the recovery room in stable condition  I was present for the entire procedure  All sponge, needle and instrument counts were correct x2  Anesthesia was reversed and the patient was taken to the PACU in a stable condition       I was present for the entire procedure    Patient Disposition:  PACU     SIGNATURE: Rahat Tenorio MD  DATE: January 20, 2022  TIME: 2:22 PM

## 2022-01-20 NOTE — DISCHARGE INSTRUCTIONS
Cervical Cone Biopsy   WHAT YOU NEED TO KNOW:   A cervical cone biopsy is surgery to take cells from your cervix  Surgery may be done so cells can be tested for cancer or can be removed before they become cancer  DISCHARGE INSTRUCTIONS:   Call your surgeon or gynecologist if:   · You have white or yellow vaginal discharge  · You have more vaginal bleeding than you were told to expect  · You have a fever  · You have new or increased pain in your lower abdomen and pelvic area  · You have vaginal bleeding, and it is not time for your monthly period  · You feel pain when you urinate, or your urine looks cloudy  · You have questions or concerns about your condition or care  Medicines: You may need any of the following:  · Prescription pain medicine  may be given  Ask your healthcare provider how to take this medicine safely  Some prescription pain medicines contain acetaminophen  Do not take other medicines that contain acetaminophen without talking to your healthcare provider  Too much acetaminophen may cause liver damage  Prescription pain medicine may cause constipation  Ask your healthcare provider how to prevent or treat constipation  · Antibiotics  prevent or fight an infection caused by bacteria  · Take your medicine as directed  Contact your healthcare provider if you think your medicine is not helping or if you have side effects  Tell him or her if you are allergic to any medicine  Keep a list of the medicines, vitamins, and herbs you take  Include the amounts, and when and why you take them  Bring the list or the pill bottles to follow-up visits  Carry your medicine list with you in case of an emergency  Self-care:   · Care for the surgery area as directed  Do not use tampons or have sex for 2 to 3 weeks after your surgery  Do not douche during this time  · Ask about vaccines    Ask your healthcare provider or gynecologist for more information about the vaccine for human papillomavirus (HPV)  The HPV vaccine may help decrease your risk for an HPV infection and abnormal cervical cells  A condom can help prevent the spread of HPV during sex  Ask your healthcare provider for more information on ways to prevent HPV infection  · Talk to your providers about pregnancy  You may have trouble becoming pregnant after a cervical cone biopsy  If you do become pregnant, your baby may need to be delivered early  You may need more care during a future pregnancy to help prevent problems  · Do not smoke  Smoking increases the risk of abnormal cells in your cervix  Ask for information if you need help quitting  Do not use E-cigarettes or smokeless tobacco in place of cigarettes or to help you quit  These still contain nicotine  Follow up with your surgeon or gynecologist as directed: You may need to return to make sure your cervix is healing  Write down your questions so you remember to ask them during your visits  © Copyright [employer] 2021 Information is for End User's use only and may not be sold, redistributed or otherwise used for commercial purposes  All illustrations and images included in CareNotes® are the copyrighted property of [employer]  or Evangelina Sanchez   The above information is an  only  It is not intended as medical advice for individual conditions or treatments  Talk to your doctor, nurse or pharmacist before following any medical regimen to see if it is safe and effective for you

## 2022-01-20 NOTE — ANESTHESIA POSTPROCEDURE EVALUATION
Post-Op Assessment Note    CV Status:  Stable    Pain management: adequate     Mental Status:  Sleepy and arousable   Hydration Status:  Euvolemic   PONV Controlled:  Controlled   Airway Patency:  Patent      Post Op Vitals Reviewed: Yes      Staff: Anesthesiologist         There were no known complications for this encounter      /93 (01/20/22 1431)    Temp (!) 97 1 °F (36 2 °C) (01/20/22 1431)    Pulse 77 (01/20/22 1431)   Resp 16 (01/20/22 1431)    SpO2 99 % (01/20/22 1431)

## 2022-01-20 NOTE — ANESTHESIA PREPROCEDURE EVALUATION
Procedure:  COLD KNIFE CONE (N/A Cervix)    Relevant Problems   ANESTHESIA (within normal limits)      CARDIO   (-) HTN (hypertension)      ENDO   (-) Diabetes mellitus, type 2 (HCC)   (-) Hyperthyroidism   (-) Hypothyroidism      GI/HEPATIC   (-) Gastroesophageal reflux disease      /RENAL   (-) Dialysis patient ([place] [address] )      GYN (within normal limits)      HEMATOLOGY   (+) Anemia      MUSCULOSKELETAL (within normal limits)      NEURO/PSYCH   (+) Depression   (+) PTSD (post-traumatic stress disorder)   (-) CVA (cerebral vascular accident) ([place] [address] )   (-) Seizures (HCC)      PULMONARY   (-) Asthma   (-) Sleep apnea      Other   (+) Bipolar disorder (HCC)   (+) Elevated LDL cholesterol level        Physical Exam    Airway    Mallampati score: III  TM Distance: >3 FB  Neck ROM: full     Dental       Cardiovascular  Rhythm: regular, Rate: normal, No murmur,     Pulmonary  Breath sounds clear to auscultation,     Other Findings        Anesthesia Plan  ASA Score- 1     Anesthesia Type- general with ASA Monitors  Additional Monitors:   Airway Plan: LMA  Plan Factors-Exercise tolerance (METS): >4 METS  Chart reviewed  EKG reviewed  Existing labs reviewed  Patient summary reviewed  Patient is not a current smoker  Induction- intravenous  Postoperative Plan- Plan for postoperative opioid use  Informed Consent- Anesthetic plan and risks discussed with patient

## 2022-01-26 ENCOUNTER — TELEPHONE (OUTPATIENT)
Dept: GASTROENTEROLOGY | Facility: HOSPITAL | Age: 47
End: 2022-01-26

## 2022-01-26 ENCOUNTER — ANESTHESIA EVENT (OUTPATIENT)
Dept: ANESTHESIOLOGY | Facility: HOSPITAL | Age: 47
End: 2022-01-26

## 2022-01-26 ENCOUNTER — ANESTHESIA (OUTPATIENT)
Dept: ANESTHESIOLOGY | Facility: HOSPITAL | Age: 47
End: 2022-01-26

## 2022-01-26 NOTE — ANESTHESIA PREPROCEDURE EVALUATION
Procedure:  PRE-OP ONLY    Relevant Problems   HEMATOLOGY   (+) Anemia      NEURO/PSYCH   (+) Depression   (+) PTSD (post-traumatic stress disorder)      EKG 1/4/2022:  Normal sinus rhythm  Incomplete right bundle branch block  Poor R wave progression  Abnormal ECG      Lab Results   Component Value Date    WBC 7 63 01/04/2022    HGB 13 0 01/04/2022    HCT 43 4 01/04/2022    MCV 83 01/04/2022     01/04/2022     Lab Results   Component Value Date    SODIUM 136 01/04/2022    K 3 9 01/04/2022     01/04/2022    CO2 26 01/04/2022    BUN 9 01/04/2022    CREATININE 0 66 01/04/2022    GLUC 85 01/24/2017    CALCIUM 8 7 01/04/2022     No results found for: INR, PROTIME  Lab Results   Component Value Date    HGBA1C 5 3 01/22/2018               Anesthesia Plan  ASA Score- 2     Anesthesia Type- IV sedation with anesthesia with ASA Monitors  Additional Monitors:   Airway Plan:           Plan Factors-    Chart reviewed  EKG reviewed  Existing labs reviewed  Patient summary reviewed  Induction- intravenous      Postoperative Plan-     Informed Consent-

## 2022-01-27 ENCOUNTER — HOSPITAL ENCOUNTER (OUTPATIENT)
Dept: GASTROENTEROLOGY | Facility: HOSPITAL | Age: 47
Setting detail: OUTPATIENT SURGERY
Discharge: HOME | End: 2022-01-27
Attending: INTERNAL MEDICINE | Admitting: INTERNAL MEDICINE
Payer: MEDICARE

## 2022-01-27 ENCOUNTER — ANESTHESIA EVENT (OUTPATIENT)
Dept: GASTROENTEROLOGY | Facility: HOSPITAL | Age: 47
End: 2022-01-27

## 2022-01-27 ENCOUNTER — ANESTHESIA (OUTPATIENT)
Dept: GASTROENTEROLOGY | Facility: HOSPITAL | Age: 47
End: 2022-01-27

## 2022-01-27 VITALS
TEMPERATURE: 96.5 F | DIASTOLIC BLOOD PRESSURE: 76 MMHG | OXYGEN SATURATION: 98 % | RESPIRATION RATE: 18 BRPM | BODY MASS INDEX: 37.56 KG/M2 | WEIGHT: 220 LBS | SYSTOLIC BLOOD PRESSURE: 116 MMHG | HEART RATE: 71 BPM | HEIGHT: 64 IN

## 2022-01-27 DIAGNOSIS — Z12.11 COLON CANCER SCREENING: ICD-10-CM

## 2022-01-27 PROCEDURE — 45385 COLONOSCOPY W/LESION REMOVAL: CPT | Performed by: INTERNAL MEDICINE

## 2022-01-27 PROCEDURE — 88305 TISSUE EXAM BY PATHOLOGIST: CPT | Performed by: PATHOLOGY

## 2022-01-27 RX ORDER — SODIUM CHLORIDE, SODIUM LACTATE, POTASSIUM CHLORIDE, CALCIUM CHLORIDE 600; 310; 30; 20 MG/100ML; MG/100ML; MG/100ML; MG/100ML
INJECTION, SOLUTION INTRAVENOUS CONTINUOUS PRN
Status: DISCONTINUED | OUTPATIENT
Start: 2022-01-27 | End: 2022-01-27

## 2022-01-27 RX ORDER — PROPOFOL 10 MG/ML
INJECTION, EMULSION INTRAVENOUS CONTINUOUS PRN
Status: DISCONTINUED | OUTPATIENT
Start: 2022-01-27 | End: 2022-01-27

## 2022-01-27 RX ORDER — PROPOFOL 10 MG/ML
INJECTION, EMULSION INTRAVENOUS AS NEEDED
Status: DISCONTINUED | OUTPATIENT
Start: 2022-01-27 | End: 2022-01-27

## 2022-01-27 RX ADMIN — PROPOFOL 140 MCG/KG/MIN: 10 INJECTION, EMULSION INTRAVENOUS at 12:01

## 2022-01-27 RX ADMIN — SODIUM CHLORIDE, SODIUM LACTATE, POTASSIUM CHLORIDE, AND CALCIUM CHLORIDE: .6; .31; .03; .02 INJECTION, SOLUTION INTRAVENOUS at 11:55

## 2022-01-27 RX ADMIN — PROPOFOL 90 MG: 10 INJECTION, EMULSION INTRAVENOUS at 12:01

## 2022-01-27 NOTE — H&P
History and Physical - [facility]  Calixto Class 55 y o  female MRN: [id number]                  HPI: Calixto Medley is a 55y o  year old female who presents for colon cancer screening due to family history       REVIEW OF SYSTEMS: Per the HPI, and otherwise unremarkable      Historical Information   Past Medical History:   Diagnosis Date    Anemia     Last assessed 2014    Anxiety     Bipolar 1 disorder ([place] [address] )     Class 2 obesity due to excess calories without serious comorbidity with body mass index (BMI) of 37 0 to 37 9 in adult 2019    Depression     Diastasis recti     Last assessed 2017    Migraine     Last assessed 2014    Obesity (BMI 35 0-39 9 without comorbidity) 2018    PTSD (post-traumatic stress disorder)      Past Surgical History:   Procedure Laterality Date    BREAST EXCISIONAL BIOPSY Left     age 12   [place] [facility] BREAST SURGERY      Lumpectomy    CERVICAL BIOPSY N/A 2022    Procedure: COLD KNIFE CONE AND VULVAR BIOPSIES;  Surgeon: Mil Blackwell MD;  Location: [facility] OR;  Service: Gynecology Oncology     SECTION      LAPAROSCOPY FOR ECTOPIC PREGNANCY      TUBAL LIGATION       Social History   Social History     Substance and Sexual Activity   Alcohol Use Yes    Comment: Rarely     Social History     Substance and Sexual Activity   Drug Use Never     Social History     Tobacco Use   Smoking Status Never Smoker   Smokeless Tobacco Never Used     Family History   Problem Relation Age of Onset    Diabetes Mother     Hyperlipidemia Mother     Hypertension Mother     Ovarian cancer Mother 76    Alcohol abuse Father     Substance Abuse Father     Colon cancer Sister 39    Leukemia Maternal Grandmother     Breast cancer Maternal Aunt         unknown age   [place] [facility] No Known Problems Maternal Grandfather     No Known Problems Paternal Grandmother     No Known Problems Paternal Grandfather     No Known Problems Son     No Known Problems Son     No Known Problems Son     No Known Problems Son     No Known Problems Sister     Breast cancer Maternal [de-identified]         unknown age   [place] Breast cancer Maternal Aunt         unknown age   [place] Breast cancer Maternal Aunt         unknown age   [place] Uterine cancer Maternal Aunt         unknonw age   [place] No Known Problems Paternal Aunt     No Known Problems Paternal Aunt     No Known Problems Paternal Aunt     Testicular cancer Cousin 29       Meds/Allergies       Current Outpatient Medications:     clobetasol (TEMOVATE) 0 05 % ointment    Allergies   Allergen Reactions    Pollen Extract        Objective     /73   Pulse 80   Temp 98 7 °F (37 1 °C) (Tympanic)   Resp 18   Ht 5' 4" (1 626 m)   Wt 99 8 kg (220 lb)   LMP 12/18/2021   SpO2 98%   BMI 37 76 kg/m²       PHYSICAL EXAM    Gen: NAD  Head: NCAT  CV: RRR  CHEST: Clear  ABD: soft, NT/ND  EXT: no edema      ASSESSMENT/PLAN:  This is a 55y o  year old female here for colonoscopy, and she is stable and optimized for her procedure

## 2022-01-27 NOTE — ANESTHESIA PREPROCEDURE EVALUATION
Procedure:  COLONOSCOPY    Relevant Problems   ANESTHESIA (within normal limits)      CARDIO (within normal limits)      ENDO (within normal limits)      GI/HEPATIC (within normal limits)      /RENAL (within normal limits)      HEMATOLOGY   (+) Anemia      NEURO/PSYCH   (+) Depression   (+) PTSD (post-traumatic stress disorder)      PULMONARY (within normal limits)      Other   (+) Bipolar disorder (HCC)   (+) LIZETH III (cervical intraepithelial neoplasia III)   (+) Class 3 severe obesity due to excess calories without serious comorbidity with body mass index (BMI) of 40 0 to 44 9 in adult [facility])   (+) Elevated LDL cholesterol level      EKG 1/4/2022:  Normal sinus rhythm  Incomplete right bundle branch block  Poor R wave progression  Abnormal ECG      Lab Results   Component Value Date    WBC 7 63 01/04/2022    HGB 13 0 01/04/2022    HCT 43 4 01/04/2022    MCV 83 01/04/2022     01/04/2022     Lab Results   Component Value Date    SODIUM 136 01/04/2022    K 3 9 01/04/2022     01/04/2022    CO2 26 01/04/2022    BUN 9 01/04/2022    CREATININE 0 66 01/04/2022    GLUC 85 01/24/2017    CALCIUM 8 7 01/04/2022     No results found for: INR, PROTIME  Lab Results   Component Value Date    HGBA1C 5 3 01/22/2018          Physical Exam    Airway    Mallampati score: II  TM Distance: >3 FB  Neck ROM: full     Dental       Cardiovascular  Cardiovascular exam normal    Pulmonary  Pulmonary exam normal     Other Findings        Anesthesia Plan  ASA Score- 2     Anesthesia Type- IV sedation with anesthesia with ASA Monitors  Additional Monitors:   Airway Plan:           Plan Factors-Exercise tolerance (METS): >4 METS  Chart reviewed  EKG reviewed  Existing labs reviewed  Patient summary reviewed  Induction- intravenous  Postoperative Plan-     Informed Consent- Anesthetic plan and risks discussed with patient  I personally reviewed this patient with the CRNA   Discussed and agreed on the Anesthesia Plan with the CB Branham

## 2022-01-27 NOTE — ANESTHESIA POSTPROCEDURE EVALUATION
Post-Op Assessment Note    CV Status:  Stable  Pain Score: 0    Pain management: adequate     Mental Status:  Alert   Hydration Status:  Stable   PONV Controlled:  Controlled   Airway Patency:  Patent      Post Op Vitals Reviewed: Yes      Staff: Anesthesiologist, CRNA         No complications documented      /69 (01/27/22 1233)    Temp (!) 96 5 °F (35 8 °C) (01/27/22 1233)    Pulse 73 (01/27/22 1233)   Resp 18 (01/27/22 1233)    SpO2 97 % (01/27/22 1233)

## 2022-02-08 ENCOUNTER — OFFICE VISIT (OUTPATIENT)
Dept: GYNECOLOGIC ONCOLOGY | Facility: CLINIC | Age: 47
End: 2022-02-08

## 2022-02-08 VITALS
RESPIRATION RATE: 17 BRPM | OXYGEN SATURATION: 99 % | HEART RATE: 77 BPM | SYSTOLIC BLOOD PRESSURE: 118 MMHG | TEMPERATURE: 98.2 F | DIASTOLIC BLOOD PRESSURE: 74 MMHG | WEIGHT: 238 LBS | HEIGHT: 64 IN | BODY MASS INDEX: 40.63 KG/M2

## 2022-02-08 DIAGNOSIS — D06.1 CARCINOMA IN SITU OF EXOCERVIX: Primary | ICD-10-CM

## 2022-02-08 DIAGNOSIS — L90.0 LICHEN SCLEROSUS: ICD-10-CM

## 2022-02-08 PROCEDURE — 99024 POSTOP FOLLOW-UP VISIT: CPT | Performed by: OBSTETRICS & GYNECOLOGY

## 2022-02-08 RX ORDER — CLOBETASOL PROPIONATE 0.5 MG/G
OINTMENT TOPICAL DAILY
Qty: 45 G | Refills: 2 | Status: SHIPPED | OUTPATIENT
Start: 2022-02-08 | End: 2022-03-08

## 2022-02-08 NOTE — ASSESSMENT & PLAN NOTE
Biopsy confirmed  Has been using clobetasol p r n  Discussed with patient using a clobetasol taper given the extensive nature and young age a developing    Would recommend clobetasol maintenance afterward

## 2022-02-08 NOTE — PROGRESS NOTES
Assessment/Plan:    Problem List Items Addressed This Visit        Genitourinary    LIZETH III (cervical intraepithelial neoplasia III) - Primary     53yo with PTSD/depression, obesity, lichen sclerosis and CIN3 s/p CKC presents for post op  Pathology c/w High grade and low grade lesions  Negative margins noted  Repeat pap 1 year and f/u with gyn for routine care  Other    Lichen sclerosus     Biopsy confirmed  Has been using clobetasol p r n  Discussed with patient using a clobetasol taper given the extensive nature and young age a developing  Would recommend clobetasol maintenance afterward         Relevant Medications    clobetasol (TEMOVATE) 0 05 % ointment            CHIEF COMPLAINT: post op        Problem:  Cancer Staging  No matching staging information was found for the patient  Previous therapy:  Oncology History    No history exists  Patient ID: Howie Valverde is a 55 y o  female  53yo with PTSD/depression, obesity, lichen sclerosis and CIN3 s/p CKC presents for post op  Patient denies any vaginal bleeding  Mild vaginal discharge  She reports longstanding lichen sclerosis for which she uses clobetasol p r n     She only uses it when she notices itching  She has not noticed any new lesions and does not like to look at the area  5/2017: NIL  11/2021:ASCUS/+HRHPV, colpo 1:00 CIN2-3  1/2022: CKC negative margins           The following portions of the patient's history were reviewed and updated as appropriate: allergies, current medications, past family history, past medical history, past social history, past surgical history and problem list     Review of Systems   Constitutional: Negative for appetite change, chills, fatigue and fever  Respiratory: Negative for chest tightness and shortness of breath  Gastrointestinal: Negative for abdominal distention, abdominal pain, constipation, diarrhea and nausea     Genitourinary: Negative for difficulty urinating, flank pain, frequency, urgency, vaginal bleeding, vaginal discharge and vaginal pain  Musculoskeletal: Negative for back pain, joint swelling and myalgias  Skin: Negative for rash  Neurological: Negative for dizziness, light-headedness, numbness and headaches  Current Outpatient Medications:     clobetasol (TEMOVATE) 0 05 % ointment, Apply topically 2 (two) times a day for 7 days, Disp: 45 g, Rfl: 1    Objective:    not currently breastfeeding  There is no height or weight on file to calculate BMI  There is no height or weight on file to calculate BSA  Physical Exam  HENT:      Head: Normocephalic and atraumatic  Cardiovascular:      Rate and Rhythm: Normal rate and regular rhythm  Pulmonary:      Effort: Pulmonary effort is normal    Abdominal:      General: There is no distension  Palpations: Abdomen is soft  There is no mass  Genitourinary:     Comments: The external female genitalia has extensive lichen sclerosis along bilateral labia minora and majora  The bartholin's, uretheral and skenes glands are normal  The urethral meatus is normal (midline with no lesions)  Anus without fissure or lesion  Speculum exam reveals vagina without lesion or discharge  Cervix is well healing without visible lesions  No bleeding  No significant cystocele or rectocele noted  Bimanual exam notes a uterus with normal contour, mobility  No tenderness  Adnexa without masses or tenderness  Bladder is without fullness, mass or tenderness  Musculoskeletal:         General: Normal range of motion  Cervical back: Normal range of motion  Skin:     General: Skin is warm and dry  Neurological:      Mental Status: She is alert and oriented to person, place, and time  detailed exam

## 2022-02-08 NOTE — ASSESSMENT & PLAN NOTE
53yo with PTSD/depression, obesity, lichen sclerosis and CIN3 s/p CKC presents for post op  Pathology c/w High grade and low grade lesions  Negative margins noted  Repeat pap 1 year and f/u with gyn for routine care

## 2022-11-23 ENCOUNTER — OFFICE VISIT (OUTPATIENT)
Dept: FAMILY MEDICINE CLINIC | Facility: CLINIC | Age: 47
End: 2022-11-23

## 2022-11-23 VITALS
DIASTOLIC BLOOD PRESSURE: 62 MMHG | SYSTOLIC BLOOD PRESSURE: 118 MMHG | TEMPERATURE: 98 F | HEART RATE: 70 BPM | WEIGHT: 241 LBS | OXYGEN SATURATION: 97 % | BODY MASS INDEX: 41.15 KG/M2 | RESPIRATION RATE: 19 BRPM | HEIGHT: 64 IN

## 2022-11-23 DIAGNOSIS — L90.0 LICHEN SCLEROSUS: ICD-10-CM

## 2022-11-23 DIAGNOSIS — Z13.1 SCREENING FOR DIABETES MELLITUS (DM): ICD-10-CM

## 2022-11-23 DIAGNOSIS — R73.01 IMPAIRED FASTING BLOOD SUGAR: ICD-10-CM

## 2022-11-23 DIAGNOSIS — Z23 FLU VACCINE NEED: Primary | ICD-10-CM

## 2022-11-23 DIAGNOSIS — R21 RASH OF FOOT: ICD-10-CM

## 2022-11-23 DIAGNOSIS — Z00.00 ENCOUNTER FOR ANNUAL WELLNESS EXAM IN MEDICARE PATIENT: ICD-10-CM

## 2022-11-23 DIAGNOSIS — D06.9 CIN III (CERVICAL INTRAEPITHELIAL NEOPLASIA III): ICD-10-CM

## 2022-11-23 DIAGNOSIS — F31.77 BIPOLAR DISORDER, IN PARTIAL REMISSION, MOST RECENT EPISODE MIXED (HCC): ICD-10-CM

## 2022-11-23 DIAGNOSIS — Z12.31 ENCOUNTER FOR SCREENING MAMMOGRAM FOR MALIGNANT NEOPLASM OF BREAST: ICD-10-CM

## 2022-11-23 DIAGNOSIS — Z13.0 SCREENING FOR DEFICIENCY ANEMIA: ICD-10-CM

## 2022-11-23 NOTE — ASSESSMENT & PLAN NOTE
FU with gyn for a repeat pap 2/2023  She has a ho HGSIL grade 2 LIZETH s/p cold knife cone and vulvar biopsies 2/8/22  The margins were negative and she will repeat the pap after 1 year

## 2022-11-23 NOTE — ASSESSMENT & PLAN NOTE
Mammogram UTD 1/2022  Pap UTD- cervical cancer treatment  Colonoscopy 1/27/22 negative  Recall 5 years due to family ho colon cancer

## 2022-11-23 NOTE — PROGRESS NOTES
Assessment/Plan:    LIZETH III (cervical intraepithelial neoplasia III)  FU with gyn for a repeat pap 2/2023  She has a ho HGSIL grade 2 LIZETH s/p cold knife cone and vulvar biopsies 2/8/22  The margins were negative and she will repeat the pap after 1 year  Lichen sclerosus  Continue clobetasol  Bipolar disorder ([facility] [address] )  Well controlled off medication  She was told she has the "good" bipolar  Likely she has bipolar 2  Diagnoses and all orders for this visit:    Flu vaccine need  -     influenza vaccine, quadrivalent, recombinant, PF, 0 5 mL, for patients 18 yr+ ([id number])    LIZETH III (cervical intraepithelial neoplasia III)    Lichen sclerosus    Impaired fasting blood sugar  -     Hemoglobin A1C; Future    Screening for deficiency anemia  -     CBC and differential; Future    Screening for diabetes mellitus (DM)  -     Comprehensive metabolic panel; Future    Bipolar disorder, in partial remission, most recent episode [facility])  -     Ambulatory Referral to Psychiatry; Future    Body mass index (BMI) 40 0-44 9, adult ([facility])    Encounter for screening mammogram for malignant neoplasm of breast  -     Mammo screening bilateral w 3d & cad; Future    Rash of foot  -     Ambulatory Referral to Podiatry; Future        Subjective: establish care     Patient ID: Ruthie Irene is a 52 y o  female with a ho bipolar disorder, obesity, cervical dysplasia, anemia, PTSD    HPI  Pt here to establish care  She has a ho HGSIL grade 2 LIZETH s/p cold knife cone and vulvar biopsies 2/8/22  The margins were negative and she will repeat the pap after 1 year  Colonoscopy 1/27/22 negative  Recall 5 years due to family ho colon cancer  Labs reviewed from January 2022  Normal fasting sugar, no anemia,  Lipids wnl 9/23/21  Mammogram UTD 1/2022  BMI Counseling: Body mass index is 41 37 kg/m²  The BMI is above normal  Nutrition recommendations include limiting drinks that contain sugar   Exercise recommendations include strength training exercises  Rationale for BMI follow-up plan is due to patient being overweight or obese  The following portions of the patient's history were reviewed and updated as appropriate: allergies, current medications, past family history, past medical history, past social history, past surgical history and problem list     Review of Systems   Constitutional: Negative for fever and unexpected weight change  HENT: Negative for ear pain, sore throat and trouble swallowing  Eyes: Negative for pain and visual disturbance  Respiratory: Negative for cough, chest tightness, shortness of breath and wheezing  Cardiovascular: Negative for chest pain  Gastrointestinal: Negative for abdominal distention, abdominal pain, blood in stool, constipation, diarrhea, nausea and vomiting  Endocrine: Negative for polydipsia and polyuria  Genitourinary: Negative for dysuria and hematuria  Musculoskeletal: Negative for back pain and myalgias  Skin: Negative for rash  Neurological: Negative for syncope and headaches  Psychiatric/Behavioral: Negative for suicidal ideas  PHQ-2/9 Depression Screening         [unfilled]    Objective:      /62 (BP Location: Left arm, Patient Position: Sitting, Cuff Size: Adult)   Pulse 70   Temp 98 °F (36 7 °C) (Tympanic)   Resp 19   Ht 5' 4" (1 626 m)   Wt 109 kg (241 lb)   SpO2 97%   BMI 41 37 kg/m²          Physical Exam  Constitutional:       Appearance: She is well-developed and well-nourished  HENT:      Head: Normocephalic and atraumatic  Right Ear: External ear normal       Left Ear: External ear normal       Mouth/Throat:      Pharynx: No oropharyngeal exudate  Eyes:      General: No scleral icterus  Extraocular Movements: EOM normal       Conjunctiva/sclera: Conjunctivae normal       Pupils: Pupils are equal, round, and reactive to light  Cardiovascular:      Rate and Rhythm: Normal rate and regular rhythm        Pulses: Intact distal pulses  Heart sounds: No murmur heard  No friction rub  No gallop  Pulmonary:      Effort: Pulmonary effort is normal  No respiratory distress  Breath sounds: Normal breath sounds  No wheezing or rales  Abdominal:      General: Bowel sounds are normal  There is no distension  Palpations: Abdomen is soft  There is no mass  Tenderness: There is no abdominal tenderness  There is no rebound  Musculoskeletal:         General: No edema  Normal range of motion  Cervical back: Normal range of motion and neck supple  Skin:     General: Skin is warm and dry  Neurological:      Mental Status: She is alert and oriented to person, place, and time     Psychiatric:         Mood and Affect: Mood and affect normal

## 2022-11-23 NOTE — PROGRESS NOTES
Assessment and Plan:     Problem List Items Addressed This Visit        Genitourinary    LIZETH III (cervical intraepithelial neoplasia III)     FU with gyn for a repeat pap 2/2023  She has a ho HGSIL grade 2 LIZETH s/p cold knife cone and vulvar biopsies 2/8/22  The margins were negative and she will repeat the pap after 1 year  Other    Bipolar disorder ([facility] )     Well controlled off medication  She was told she has the "good" bipolar  Likely she has bipolar 2  Relevant Orders    Ambulatory Referral to Psychiatry    Lichen sclerosus     Continue clobetasol  Encounter for annual wellness exam in Medicare patient     Mammogram UTD 1/2022  Pap UTD- cervical cancer treatment  Colonoscopy 1/27/22 negative  Recall 5 years due to family ho colon cancer  Other Visit Diagnoses     Flu vaccine need    -  Primary    Relevant Orders    influenza vaccine, quadrivalent, recombinant, PF, 0 5 mL, for patients 18 yr+ (FLUBLOK) (Completed)    Impaired fasting blood sugar        Relevant Orders    Hemoglobin A1C    Screening for deficiency anemia        Relevant Orders    CBC and differential    Screening for diabetes mellitus (DM)        Relevant Orders    Comprehensive metabolic panel    Body mass index (BMI) 40 0-44 9, adult ([facility] )        Encounter for screening mammogram for malignant neoplasm of breast        Relevant Orders    Mammo screening bilateral w 3d & cad    Rash of foot        Relevant Orders    Ambulatory Referral to Podiatry           Preventive health issues were discussed with patient, and age appropriate screening tests were ordered as noted in patient's After Visit Summary  Personalized health advice and appropriate referrals for health education or preventive services given if needed, as noted in patient's After Visit Summary       History of Present Illness:     Patient presents for a Medicare Wellness Visit    HPI   Patient Care Team:  Amaury Caruso MD as PCP - General (Family Medicine)     Review of Systems:     Review of Systems   Constitutional: Negative for fever and unexpected weight change  HENT: Negative for ear pain, sore throat and trouble swallowing  Eyes: Negative for pain and visual disturbance  Respiratory: Negative for cough, chest tightness, shortness of breath and wheezing  Cardiovascular: Negative for chest pain  Gastrointestinal: Negative for abdominal distention, abdominal pain, blood in stool, constipation, diarrhea, nausea and vomiting  Endocrine: Negative for polydipsia and polyuria  Genitourinary: Negative for dysuria and hematuria  Musculoskeletal: Negative for back pain and myalgias  Skin: Negative for rash  Neurological: Negative for syncope and headaches  Psychiatric/Behavioral: Negative for suicidal ideas          Problem List:     Patient Active Problem List   Diagnosis   • Bipolar disorder ([facility] )   • Lichen sclerosus   • Family history of colon cancer   • Immunization deficiency   • Elevated LDL cholesterol level   • Encounter for annual wellness exam in Medicare patient   • Class 3 severe obesity due to excess calories without serious comorbidity with body mass index (BMI) of 40 0 to 44 9 in adult [facility])   • LIZETH III (cervical intraepithelial neoplasia III)   • Depression   • Anemia   • PTSD (post-traumatic stress disorder)      Past Medical and Surgical History:     Past Medical History:   Diagnosis Date   • Anemia     Last assessed 9/25/2014   • Anxiety    • Bipolar 1 disorder ([facility] )    • Class 2 obesity due to excess calories without serious comorbidity with body mass index (BMI) of 37 0 to 37 9 in adult 8/19/2019   • Depression    • Diastasis recti     Last assessed 1/19/2017   • Migraine     Last assessed 11/19/2014   • Obesity (BMI 35 0-39 9 without comorbidity) 12/28/2018   • PTSD (post-traumatic stress disorder)      Past Surgical History:   Procedure Laterality Date   • BREAST EXCISIONAL BIOPSY Left     age 12   • BREAST SURGERY      Lumpectomy   • CERVICAL BIOPSY N/A 2022    Procedure: COLD KNIFE CONE AND VULVAR BIOPSIES;  Surgeon: Shankar Small MD;  [facility]: [facility];  Service: Gynecology Oncology   •  SECTION     • LAPAROSCOPY FOR ECTOPIC PREGNANCY     • TUBAL LIGATION        Family History:     Family History   Problem Relation Age of Onset   • Diabetes Mother    • Hyperlipidemia Mother    • Hypertension Mother    • Ovarian cancer Mother 76   • Alcohol abuse Father    • Substance Abuse Father    • Mental illness Father    • Colon cancer Sister    • Leukemia Maternal Grandmother    • Breast cancer Maternal Aunt         unknown age   • No Known Problems Maternal Grandfather    • No Known Problems Paternal Grandmother    • No Known Problems Paternal Grandfather    • No Known Problems Son    • No Known Problems Son    • No Known Problems Son    • No Known Problems Son    • No Known Problems Sister    • Breast cancer Maternal Aunt         unknown age   • Breast cancer Maternal Aunt         unknown age   • Breast cancer Maternal Aunt         unknown age   • Uterine cancer Maternal Aunt         unknonw age   • No Known Problems Paternal Aunt    • No Known Problems Paternal Aunt    • No Known Problems Paternal Aunt    • Testicular cancer Cousin 29   • Cancer Maternal Aunt       Social History:     Social History     Socioeconomic History   • Marital status: Single     Spouse name: None   • Number of children: 4   • Years of education: None   • Highest education level: None   Occupational History   • None   Tobacco Use   • Smoking status: Never   • Smokeless tobacco: Never   Vaping Use   • Vaping Use: Never used   Substance and Sexual Activity   • Alcohol use: Yes     Comment: Rarely   • Drug use: Never   • Sexual activity: Yes     Partners: Male     Birth control/protection: Surgical   Other Topics Concern   • None   Social History Narrative    Child of  household    Disability-Mental Health Social Determinants of Health     Financial Resource Strain: Low Risk    • Difficulty of Paying Living Expenses: Not very hard   Food Insecurity: Not on file   Transportation Needs: No Transportation Needs   • Lack of Transportation (Medical): No   • Lack of Transportation (Non-Medical): No   Physical Activity: Not on file   Stress: Not on file   Social Connections: Not on file   Intimate Partner Violence: Not on file   Housing Stability: Not on file      Medications and Allergies:     Current Outpatient Medications   Medication Sig Dispense Refill   • clobetasol (TEMOVATE) 0 05 % ointment Apply topically in the morning for 28 days Clobetasol application: Apply to affected area at night for four weeks, then every other night for four weeks, then twice weekly until follow up 45 g 2     No current facility-administered medications for this visit  Allergies   Allergen Reactions   • Pollen Extract       Immunizations:     Immunization History   Administered Date(s) Administered   • COVID-19 PFIZER VACCINE 0 3 ML IM 04/20/2021, 05/02/2021, 02/10/2022   • Hep B, adult 08/21/2019, 09/21/2021   • INFLUENZA 12/18/2018   • Influenza, injectable, quadrivalent, preservative free 0 5 mL 09/21/2021   • Influenza, recombinant, quadrivalent,injectable, preservative free 11/23/2022   • MMR 08/21/2019   • Tdap 01/19/2017   • Tuberculin Skin Test-PPD Intradermal 08/19/2019, 09/21/2021      Health Maintenance:         Topic Date Due   • Hepatitis C Screening  Never done   • HIV Screening  Never done   • Breast Cancer Screening: Mammogram  01/19/2023   • Cervical Cancer Screening  11/02/2026   • Colorectal Cancer Screening  01/26/2027         Topic Date Due   • Hepatitis B Vaccine (3 of 3 - 3-dose series) 11/16/2021      Medicare Screening Tests and Risk Assessments:     Gina Enrique is here for her Subsequent Wellness visit  Last Medicare Wellness visit information reviewed, patient interviewed and updates made to the record today  Health Risk Assessment:   Patient rates overall health as good  Patient feels that their physical health rating is same  Eyesight was rated as same  Hearing was rated as same  Patient feels that their emotional and mental health rating is much better  Patients states they are never, rarely angry  Patient states they are never, rarely unusually tired/fatigued  Pain experienced in the last 7 days has been none  Patient states that she has experienced no weight loss or gain in last 6 months  Depression Screening:   PHQ-9 Score: 0      Fall Risk Screening: In the past year, patient has experienced: no history of falling in past year      Urinary Incontinence Screening:   Patient has not leaked urine accidently in the last six months  Home Safety:  Patient does not have trouble with stairs inside or outside of their home  Patient has working smoke alarms and has working carbon monoxide detector  Home safety hazards include: none  Nutrition:   Current diet is Regular  Medications:   Patient is not currently taking any over-the-counter supplements  Patient is able to manage medications  Activities of Daily Living (ADLs)/Instrumental Activities of Daily Living (IADLs):   Walk and transfer into and out of bed and chair?: Yes  Dress and groom yourself?: Yes    Bathe or shower yourself?: Yes    Feed yourself?  Yes  Do your laundry/housekeeping?: Yes  Manage your money, pay your bills and track your expenses?: Yes  Make your own meals?: Yes    Do your own shopping?: Yes    Previous Hospitalizations:   Any hospitalizations or ED visits within the last 12 months?: No      Advance Care Planning:   Living will: Yes    Durable POA for healthcare: No    Advanced directive: Yes      Cognitive Screening:   Provider or family/friend/caregiver concerned regarding cognition?: No    PREVENTIVE SCREENINGS      Cardiovascular Screening:    General: Screening Current      Diabetes Screening:     General: Screening Current      Colorectal Cancer Screening:     General: Screening Current      Breast Cancer Screening:     General: Screening Current      Cervical Cancer Screening:    General: History Cervical Cancer and Screening Current      Osteoporosis Screening:    General: Screening Not Indicated      Abdominal Aortic Aneurysm (AAA) Screening:        General: Screening Not Indicated      Lung Cancer Screening:     General: Screening Not Indicated      Hepatitis C Screening:    General: Screening Not Indicated    Screening, Brief Intervention, and Referral to Treatment (SBIRT)    Screening  Typical number of drinks in a day: 0  Typical number of drinks in a week: 0  Interpretation: Low risk drinking behavior  AUDIT-C Screenin) How often did you have a drink containing alcohol in the past year? never  2) How many drinks did you have on a typical day when you were drinking in the past year? 0  3) How often did you have 6 or more drinks on one occasion in the past year? never    AUDIT-C Score: 0  Interpretation: Score 0-2 (female): Negative screen for alcohol misuse    Single Item Drug Screening:  How often have you used an illegal drug (including marijuana) or a prescription medication for non-medical reasons in the past year? never    Single Item Drug Screen Score: 0  Interpretation: Negative screen for possible drug use disorder    Brief Intervention  Alcohol & drug use screenings were reviewed  No concerns regarding substance use disorder identified  No results found  Physical Exam:     /62 (BP Location: Left arm, Patient Position: Sitting, Cuff Size: Adult)   Pulse 70   Temp 98 °F (36 7 °C) (Tympanic)   Resp 19   Ht 5' 4" (1 626 m)   Wt 109 kg (241 lb)   SpO2 97%   BMI 41 37 kg/m²     Physical Exam  Constitutional:       Appearance: She is well-developed  HENT:      Head: Normocephalic and atraumatic  Eyes:      General: No scleral icterus       Conjunctiva/sclera: Conjunctivae normal  Pupils: Pupils are equal, round, and reactive to light  Cardiovascular:      Rate and Rhythm: Normal rate and regular rhythm  Heart sounds: Normal heart sounds  No murmur heard  No friction rub  No gallop  Pulmonary:      Effort: Pulmonary effort is normal  No respiratory distress  Breath sounds: No wheezing or rales  Chest:      Chest wall: No tenderness  Abdominal:      General: Bowel sounds are normal  There is no distension  Palpations: Abdomen is soft  There is no mass  Tenderness: There is no abdominal tenderness  Musculoskeletal:         General: Normal range of motion  Cervical back: Normal range of motion and neck supple  Lymphadenopathy:      Cervical: No cervical adenopathy  Skin:     General: Skin is warm and dry  Capillary Refill: Capillary refill takes less than 2 seconds  Findings: No rash  Neurological:      Mental Status: She is alert and oriented to person, place, and time  Cranial Nerves: No cranial nerve deficit            Nhan Lugo MD

## 2022-11-28 ENCOUNTER — TELEPHONE (OUTPATIENT)
Dept: PSYCHIATRY | Facility: CLINIC | Age: 47
End: 2022-11-28

## 2022-11-29 ENCOUNTER — LAB (OUTPATIENT)
Dept: LAB | Facility: CLINIC | Age: 47
End: 2022-11-29

## 2022-11-29 DIAGNOSIS — Z13.1 SCREENING FOR DIABETES MELLITUS (DM): ICD-10-CM

## 2022-11-29 DIAGNOSIS — R73.01 IMPAIRED FASTING BLOOD SUGAR: ICD-10-CM

## 2022-11-29 DIAGNOSIS — Z13.0 SCREENING FOR DEFICIENCY ANEMIA: ICD-10-CM

## 2022-11-29 LAB
ALBUMIN SERPL BCP-MCNC: 3.1 G/DL (ref 3.5–5)
ALP SERPL-CCNC: 86 U/L (ref 46–116)
ALT SERPL W P-5'-P-CCNC: 26 U/L (ref 12–78)
ANION GAP SERPL CALCULATED.3IONS-SCNC: 4 MMOL/L (ref 4–13)
AST SERPL W P-5'-P-CCNC: 17 U/L (ref 5–45)
BASOPHILS # BLD AUTO: 0.05 THOUSANDS/ÂΜL (ref 0–0.1)
BASOPHILS NFR BLD AUTO: 1 % (ref 0–1)
BILIRUB SERPL-MCNC: 0.35 MG/DL (ref 0.2–1)
BUN SERPL-MCNC: 10 MG/DL (ref 5–25)
CALCIUM ALBUM COR SERPL-MCNC: 9.6 MG/DL (ref 8.3–10.1)
CALCIUM SERPL-MCNC: 8.9 MG/DL (ref 8.3–10.1)
CHLORIDE SERPL-SCNC: 108 MMOL/L (ref 96–108)
CO2 SERPL-SCNC: 27 MMOL/L (ref 21–32)
CREAT SERPL-MCNC: 0.72 MG/DL (ref 0.6–1.3)
EOSINOPHIL # BLD AUTO: 0.21 THOUSAND/ÂΜL (ref 0–0.61)
EOSINOPHIL NFR BLD AUTO: 3 % (ref 0–6)
ERYTHROCYTE [DISTWIDTH] IN BLOOD BY AUTOMATED COUNT: 15.8 % (ref 11.6–15.1)
EST. AVERAGE GLUCOSE BLD GHB EST-MCNC: 108 MG/DL
GFR SERPL CREATININE-BSD FRML MDRD: 100 ML/MIN/1.73SQ M
GLUCOSE P FAST SERPL-MCNC: 106 MG/DL (ref 65–99)
HBA1C MFR BLD: 5.4 %
HCT VFR BLD AUTO: 42.3 % (ref 34.8–46.1)
HGB BLD-MCNC: 12.6 G/DL (ref 11.5–15.4)
IMM GRANULOCYTES # BLD AUTO: 0.02 THOUSAND/UL (ref 0–0.2)
IMM GRANULOCYTES NFR BLD AUTO: 0 % (ref 0–2)
LYMPHOCYTES # BLD AUTO: 3.18 THOUSANDS/ÂΜL (ref 0.6–4.47)
LYMPHOCYTES NFR BLD AUTO: 39 % (ref 14–44)
MCH RBC QN AUTO: 24.9 PG (ref 26.8–34.3)
MCHC RBC AUTO-ENTMCNC: 29.8 G/DL (ref 31.4–37.4)
MCV RBC AUTO: 83 FL (ref 82–98)
MONOCYTES # BLD AUTO: 0.49 THOUSAND/ÂΜL (ref 0.17–1.22)
MONOCYTES NFR BLD AUTO: 6 % (ref 4–12)
NEUTROPHILS # BLD AUTO: 4.2 THOUSANDS/ÂΜL (ref 1.85–7.62)
NEUTS SEG NFR BLD AUTO: 51 % (ref 43–75)
NRBC BLD AUTO-RTO: 0 /100 WBCS
PLATELET # BLD AUTO: 324 THOUSANDS/UL (ref 149–390)
PMV BLD AUTO: 11.3 FL (ref 8.9–12.7)
POTASSIUM SERPL-SCNC: 4.4 MMOL/L (ref 3.5–5.3)
PROT SERPL-MCNC: 7.5 G/DL (ref 6.4–8.4)
RBC # BLD AUTO: 5.07 MILLION/UL (ref 3.81–5.12)
SODIUM SERPL-SCNC: 139 MMOL/L (ref 135–147)
WBC # BLD AUTO: 8.15 THOUSAND/UL (ref 4.31–10.16)

## 2022-12-05 ENCOUNTER — CONSULT (OUTPATIENT)
Dept: MULTI SPECIALTY CLINIC | Facility: CLINIC | Age: 47
End: 2022-12-05

## 2022-12-05 VITALS
HEART RATE: 72 BPM | WEIGHT: 245 LBS | BODY MASS INDEX: 42.05 KG/M2 | DIASTOLIC BLOOD PRESSURE: 74 MMHG | TEMPERATURE: 98.1 F | SYSTOLIC BLOOD PRESSURE: 113 MMHG

## 2022-12-05 DIAGNOSIS — L30.9 DERMATITIS: Primary | ICD-10-CM

## 2022-12-05 DIAGNOSIS — R21 RASH OF FOOT: ICD-10-CM

## 2022-12-05 RX ORDER — BETAMETHASONE DIPROPIONATE 0.05 %
OINTMENT (GRAM) TOPICAL 2 TIMES DAILY
Qty: 30 G | Refills: 0 | Status: SHIPPED | OUTPATIENT
Start: 2022-12-05

## 2022-12-05 NOTE — PROGRESS NOTES
[address] 52 y o  female MRN: [id number]  Encounter: [id number]      Assessment/Plan        Diagnoses and all orders for this visit:    Dermatitis  -     betamethasone dipropionate (DIPROSONE) 0 05 % ointment; Apply topically 2 (two) times a day    Rash of foot  -     Ambulatory Referral to Podiatry  -     betamethasone dipropionate (DIPROSONE) 0 05 % ointment; Apply topically 2 (two) times a day       Plan:  • Patient was seen/examined  All questions and concerns addressed  • Rx for betamethasone ointment to be applied twice daily to affected area to alleviate pruritis  Also encouraged patient to keep feet moisturized  • Low suspicion for tinea pedis at this time as rash has been localized to one area for years, no interdigital maceration and no pathology noted to toenails  • Ambulatory referral to dermatology for further evaluation and treatment if symptoms do not alleviate  • Patient should follow up as needed  Dr Edwin Lackey was present during this entire procedure  History of Present Illness     Yaneth Chawla is a 52year old female patient presenting for rash of left foot  She states that the rash started years ago and it has been consistently itchy  She has tried changing socks and footwear multiple times  She has not seen anyone else for this condition  She states that it has not spread past her left plantar forefoot ever  She denies areas of similar pruritis in other parts of her body  She has tried topical hydrocortisone to no alleviation of symptoms  Denies numbness and tingling  Denies N/V/F/CP/SOB  Review of Systems   Constitutional: Negative  HENT: Negative  Eyes: Negative  Respiratory: Negative  Cardiovascular: Negative  Gastrointestinal: Negative  Musculoskeletal: negative  Skin: left foot rash  Neurological: Negative         Historical Information   Past Medical History:   Diagnosis Date   • Anemia     Last assessed 9/25/2014   • Anxiety    • Bipolar 1 disorder ([facility] )    • Class 2 obesity due to excess calories without serious comorbidity with body mass index (BMI) of 37 0 to 37 9 in adult 2019   • Depression    • Diastasis recti     Last assessed 2017   • Migraine     Last assessed 2014   • Obesity (BMI 35 0-39 9 without comorbidity) 2018   • PTSD (post-traumatic stress disorder)      Past Surgical History:   Procedure Laterality Date   • BREAST EXCISIONAL BIOPSY Left     age 12   • BREAST SURGERY      Lumpectomy   • CERVICAL BIOPSY N/A 2022    Procedure: COLD KNIFE CONE AND VULVAR BIOPSIES;  Surgeon: Chula Leggett MD;  [facility];  Service: Gynecology Oncology   •  SECTION     • LAPAROSCOPY FOR ECTOPIC PREGNANCY     • TUBAL LIGATION       Social History   Social History     Substance and Sexual Activity   Alcohol Use Yes    Comment: Rarely     Social History     Substance and Sexual Activity   Drug Use Never     Social History     Tobacco Use   Smoking Status Never   Smokeless Tobacco Never     Family History:   Family History   Problem Relation Age of Onset   • Diabetes Mother    • Hyperlipidemia Mother    • Hypertension Mother    • Ovarian cancer Mother 76   • Alcohol abuse Father    • Substance Abuse Father    • Mental illness Father    • Colon cancer Sister    • Leukemia Maternal Grandmother    • Breast cancer Maternal Aunt         unknown age   • No Known Problems Maternal Grandfather    • No Known Problems Paternal Grandmother    • No Known Problems Paternal Grandfather    • No Known Problems Son    • No Known Problems Son    • No Known Problems Son    • No Known Problems Son    • No Known Problems Sister    • Breast cancer Maternal Aunt         unknown age   • Breast cancer Maternal Aunt         unknown age   • Breast cancer Maternal Aunt         unknown age   • Uterine cancer Maternal Aunt         unknonw age   • No Known Problems Paternal Aunt    • No Known Problems Paternal Aunt    • No Known Problems Paternal Aunt    • Testicular cancer Cousin 29   • Cancer Maternal Aunt        Meds/Allergies   (Not in a hospital admission)    Allergies   Allergen Reactions   • Pollen Extract        Objective     Current Vitals:   Blood Pressure: 113/74 (12/05/22 1335)  Pulse: 72 (12/05/22 1335)  Temperature: 98 1 °F (36 7 °C) (12/05/22 1335)  Temp Source: Temporal (12/05/22 1335)  Weight - Scale: 111 kg (245 lb) (12/05/22 1335)        /74 (BP Location: Right arm, Patient Position: Sitting, Cuff Size: Large)   Pulse 72   Temp 98 1 °F (36 7 °C) (Temporal)   Wt 111 kg (245 lb)   BMI 42 05 kg/m²       Lower Extremity Exam:    Vascular: Right foot DP/PT +2                   Left foot DP/PT +2                   There is trace lower extremity edema bilateral     Musculoskeletal: There is 5/5 strength throughout the bilateral lower extremity  There is full ankle range of motion with well maintained subtalar range of motion  There is no tenderness to foot and ankle  Neurological: Sensation to 5 07 [place]-Katherine nylon filament: intact bilaterally      Vibratory sense to distal Foot  intact bilaterally      Sharp/Dull sense is intact bilaterally      Dermatology: Skin Condition:  Left plantar forefoot 4x4cm area of dryness and peeling, no tenderness on palpation, no erythema, no fluctuance, no edema, no drainage  There is not evidence of macerated tissue between toe spaces  Nail Exam: normal nails without lesions       Open ulcerations: No     Calluses: No

## 2022-12-14 ENCOUNTER — ANNUAL EXAM (OUTPATIENT)
Dept: OBGYN CLINIC | Facility: CLINIC | Age: 47
End: 2022-12-14

## 2022-12-14 VITALS
WEIGHT: 241.8 LBS | HEIGHT: 64 IN | DIASTOLIC BLOOD PRESSURE: 68 MMHG | BODY MASS INDEX: 41.28 KG/M2 | SYSTOLIC BLOOD PRESSURE: 104 MMHG

## 2022-12-14 DIAGNOSIS — Z01.419 ENCOUNTER FOR GYNECOLOGICAL EXAMINATION WITHOUT ABNORMAL FINDING: Primary | ICD-10-CM

## 2022-12-14 DIAGNOSIS — Z01.419 PAP SMEAR, AS PART OF ROUTINE GYNECOLOGICAL EXAMINATION: ICD-10-CM

## 2022-12-14 DIAGNOSIS — Z01.419 ENCOUNTER FOR GYNECOLOGICAL EXAMINATION WITHOUT ABNORMAL FINDING: ICD-10-CM

## 2022-12-14 NOTE — PATIENT INSTRUCTIONS
Normal gynecological physical examination  Pap done today, will call with results  Follow up in 6 months for repeat Pap  Self-breast examination stressed  Mammogram ordered  Discussed regular exercise, healthy diet, importance of vitamin D and calcium supplements  Discussed importance of sun block use during periods of prolonged sun exposure  Patient will be seen in 1 year for routine gynecologic and medical examination  Patient will call office for any problems, concerns, or issues which may arise during the interim

## 2022-12-14 NOTE — PROGRESS NOTES
Assessment/Plan:    No problem-specific Assessment & Plan notes found for this encounter  Diagnoses and all orders for this visit:    Encounter for gynecological examination without abnormal finding  -     Liquid-based pap, screening    Pap smear, as part of routine gynecological examination    [id number]  -     Liquid-based pap, screening          Normal gynecological physical examination  Self-breast examination stressed  Mammogram ordered  Discussed regular exercise, healthy diet, importance of vitamin D and calcium supplements  Discussed importance of sun block use during periods of prolonged sun exposure  Patient will be seen in 1 year for routine gynecologic and medical examination  Patient will call office for any problems, concerns, or issues which may arise during the interim  Subjective:          Pt presents today for annual exam  Pt has a hx of ASCUS +HPV and LIZETH II  Pt had cold knife cone and vulvar bx in 1/2022  Pt states her periods are becoming more irregular and she is experiencing hot flashes and heat intolerance  Pt states she will occasionally skip a period  Pt states she has occasional dysuria and flank pain  Pt denies any abdominal pain, N/VD, chest pain, palpitations, SOB, headaches, vision changes  Pt is up to date with colonoscopy and mammogram is scheduled for January 2023  Pt is up to date with flu vaccine and is vaccinated for COVID x3  Educated on importance of booster   Urine dip done for dysuria/flank pain       Patient ID: Mary Menon is a 52 y o  female who presents today for her annual gynecologic and medical examination    Menstrual status: irregular, no intermittent bleeding or cramping     Vasomotor symptoms: positive for heat intolerance and hot flashes     Patient reports normal appetite    Patient reports normal bowel and bladder habits    Patient denies any significant pelvic or abdominal pain    Patient denies any headaches, chest pain, shortness of breath fever shakes or chills    Patient denies any COVID 19 symptoms including cough or loss of taste or smell    COVID vaccine status: vaccinated x3    Medical problems: Followed for LIZETH II, cholesterol    Colonoscopy status: Up to date and aware of guidelines     Mammogram status: Orders placed at this appointment     The following portions of the patient's history were reviewed and updated as appropriate: allergies, current medications, past family history, past medical history, past social history, past surgical history and problem list     Review of Systems   Constitutional: Negative  Negative for activity change, appetite change, chills, diaphoresis, fatigue, fever and unexpected weight change  HENT: Negative  Eyes: Negative  Respiratory: Negative  Cardiovascular: Negative  Gastrointestinal: Negative  Negative for abdominal pain, blood in stool, constipation, diarrhea, nausea and vomiting  Endocrine: Positive for heat intolerance  Negative for cold intolerance  Genitourinary: Positive for dysuria, flank pain and menstrual problem (irregular periods)  Negative for frequency, hematuria, urgency, vaginal bleeding, vaginal discharge and vaginal pain  Skin: Negative  Allergic/Immunologic: Negative  Neurological: Negative  Negative for light-headedness, numbness and headaches  Hematological: Negative  Negative for adenopathy  Psychiatric/Behavioral: Negative  Objective:      /68   Ht 5' 4" (1 626 m)   Wt 110 kg (241 lb 12 8 oz)   LMP 10/18/2022 (Exact Date)   BMI 41 50 kg/m²          Physical Exam  Vitals reviewed  Exam conducted with a chaperone present  Constitutional:       General: She is not in acute distress  Appearance: Normal appearance  She is well-developed  She is not ill-appearing, toxic-appearing or diaphoretic  HENT:      Head: Normocephalic and atraumatic     Eyes:      Conjunctiva/sclera: Conjunctivae normal       Pupils: Pupils are equal, round, and reactive to light  Cardiovascular:      Rate and Rhythm: Normal rate and regular rhythm  Pulses: Normal pulses  Heart sounds: Normal heart sounds  No murmur heard  No friction rub  No gallop  Pulmonary:      Effort: Pulmonary effort is normal  No respiratory distress  Breath sounds: Normal breath sounds  No wheezing, rhonchi or rales  Chest:   Breasts:     Breasts are symmetrical       Right: Normal  No inverted nipple, mass, nipple discharge, skin change or tenderness  Left: Normal  No inverted nipple, mass, nipple discharge, skin change or tenderness  Abdominal:      General: Bowel sounds are normal  There is no distension  Palpations: Abdomen is soft  Tenderness: There is no abdominal tenderness  There is no right CVA tenderness, left CVA tenderness or guarding  Genitourinary:     Exam position: Supine  Labia:         Right: No rash, tenderness, lesion or injury  Left: No rash, tenderness, lesion or injury  Urethra: No urethral pain, urethral swelling or urethral lesion  Vagina: Normal  No vaginal discharge, erythema, tenderness or bleeding  Cervix: No discharge or friability  Uterus: Not enlarged and not tender  Adnexa:         Right: No mass, tenderness or fullness  Left: No mass, tenderness or fullness  Rectum: Normal  Guaiac result negative  Musculoskeletal:         General: Normal range of motion  Cervical back: Normal range of motion and neck supple  Lymphadenopathy:      Cervical: No cervical adenopathy  Upper Body:      Right upper body: No supraclavicular or axillary adenopathy  Left upper body: No supraclavicular or axillary adenopathy  Skin:     General: Skin is warm and dry  Findings: No rash  Neurological:      General: No focal deficit present  Mental Status: She is alert and oriented to person, place, and time        Gait: Gait normal    Psychiatric:         Mood and Affect: Mood normal          Speech: Speech normal          Behavior: Behavior normal  Behavior is cooperative  Thought Content:  Thought content normal          Judgment: Judgment normal

## 2023-01-10 ENCOUNTER — TELEPHONE (OUTPATIENT)
Dept: OBGYN CLINIC | Facility: CLINIC | Age: 48
End: 2023-01-10

## 2023-01-10 NOTE — TELEPHONE ENCOUNTER
----- Message from Parish Marcos MD sent at 1/10/2023 11:43 AM EST -----  Pap smear was negative but showed possible yeast changes    Patient may use Monistat if needed    Thanks

## 2023-01-22 PROBLEM — Z00.00 ENCOUNTER FOR ANNUAL WELLNESS EXAM IN MEDICARE PATIENT: Status: RESOLVED | Noted: 2019-08-19 | Resolved: 2023-01-22

## 2023-01-30 ENCOUNTER — HOSPITAL ENCOUNTER (OUTPATIENT)
Dept: RADIOLOGY | Age: 48
Discharge: HOME | End: 2023-01-30

## 2023-01-30 VITALS — WEIGHT: 220 LBS | HEIGHT: 64 IN | BODY MASS INDEX: 37.56 KG/M2

## 2023-01-30 DIAGNOSIS — Z12.31 ENCOUNTER FOR SCREENING MAMMOGRAM FOR MALIGNANT NEOPLASM OF BREAST: ICD-10-CM

## 2023-02-22 ENCOUNTER — CONSULT (OUTPATIENT)
Dept: MULTI SPECIALTY CLINIC | Facility: CLINIC | Age: 48
End: 2023-02-22

## 2023-02-22 DIAGNOSIS — B35.3 TINEA PEDIS OF LEFT FOOT: Primary | ICD-10-CM

## 2023-02-22 RX ORDER — PRENATAL VIT 91/IRON/FOLIC/DHA 28-975-200
COMBINATION PACKAGE (EA) ORAL
Qty: 42 G | Refills: 5 | Status: SHIPPED | OUTPATIENT
Start: 2023-02-22

## 2023-02-22 NOTE — PROGRESS NOTES
[facility] Note     Patient Name: Prisca Wild  Encounter Date: 2/22/2023    Have you been cared for by a [place] [address] Dermatologist in the last 3 years and, if so, which description applies to you? NO  I am considered a "new" patient and must complete all patient intake questions  I am FEMALE/of child-bearing potential     REVIEW OF SYSTEMS:  Have you recently had or currently have any of the following? · Recent fever or chills? No  · Any non-healing wound? No  · Are you pregnant or planning to become pregnant? No  · Are you currently or planning to be nursing or breast feeding? No   PAST MEDICAL HISTORY:  Have you personally ever had or currently have any of the following? If "YES," then please provide more detail  · Skin cancer (such as Melanoma, Basal Cell Carcinoma, Squamous Cell Carcinoma? No  · Tuberculosis, HIV/AIDS, Hepatitis B or C: No  · Systemic Immunosuppression such as Diabetes, Biologic or Immunotherapy, Chemotherapy, Organ Transplantation, Bone Marrow Transplantation No  · Radiation Treatment No   FAMILY HISTORY:  Any "first degree relatives" (parent, brother, sister, or child) with the following? • Skin Cancer, Pancreatic or Other Cancer?  No   Allergies   Allergen Reactions   • Pollen Extract Sneezing      Current Outpatient Medications:   •  betamethasone dipropionate (DIPROSONE) 0 05 % ointment, Apply topically 2 (two) times a day, Disp: 30 g, Rfl: 0  •  clobetasol (TEMOVATE) 0 05 % ointment, Apply topically in the morning for 28 days Clobetasol application: Apply to affected area at night for four weeks, then every other night for four weeks, then twice weekly until follow up, Disp: 45 g, Rfl: 2          • Whom besides the patient is providing clinical information about today's encounter?   o NO ADDITIONAL HISTORIAN (patient alone provided history)    Physical Exam and Assessment/Plan by Diagnosis:    TINEA PEDIS ("ATHLETE'S FOOT")    Physical Exam:  • Anatomic Location Affected:  Left plantar foot    • Morphological Description:  Scaly plaque with scattered pustules  • Pertinent Positives: +++ KUNAL    Additional History of Present Condition:  Patient states that spot has been there for 5 years  Recently getting worse  Has tried OTC antifungals and hydrocortisone with no improvement  States it is very itchy and sometimes she scratches it to the point of bleeding  States she is being treated for a fungal infection on her labia by her obgyn currently  Assessment and Plan:  Based on a thorough discussion of this condition and the management approach to it (including a comprehensive discussion of the known risks, side effects and potential benefits of treatment), the patient (family) agrees to implement the following specific plan:  • Start topical Lamisil cream topically twice daily to the lesion on the bottom of the left foot for 5 weeks  Then use 2-3 times weekly for the next 3 weeks  • Counseled patient on making sure socks are washed with hot water and cleaning shoes to avoid reinfection  • Follow-up as needed if spot does not resolve     Tinea Pedis  Tinea pedis is a fungal infection of the foot and is in fact the most common fungal infection  Tinea pedis is caused by dermatophyte fungi with the three most common being Trichophyton (T ) rubrum, T  interdigitale and Epidermophyton floccosum  Tinea pedis most commonly involves the interdigital spaces, known as "athlete's foot " Other typical sites include the toenails, groin, and palms of the hands  There are four major manifestations of tinea pedis including chronic hyperkeratotic, chronic intertriginous, acute ulcerative and vesicobullous   Signs and symptoms include:   • Itchiness, redness, and scaling between the toes  • Scales covering the soles and sides of the feet  • Blisters over the inner aspect of the feet    It is particularly common in hot, tropical, and urban environments where sweating in the feet facilitate fungal growth  Risk factors for development include:  • Occlusive footwear  • Excessive swearing  • Diabetes or other underlying immunosuppression   • Poor peripheral circulation     The diagnosis of tinea pedis can usually be made via good history and physical exam due to its characteristic clinical features  Diagnosis can be confirmed by examining skin scrapings under the microscope  Cultures are occasionally done but may not be necessary if fungi are seen under microscopy  Other diagnoses to consider if patients do not respond to therapy include psoriasis, contact dermatitis, and eczema  Tinea pedis can be treated with topical antifungal drugs applied to affected areas on a repeated basis (usually 2 twice a day) for 2 to 4 weeks  Common topical medications include topical ketoconazole, allylamines, butenafine, ciclopirox, and tolnaftate  In cases that do not respond to topical therapy, oral antifungal agents may be used which include terbinafine, itraconazole, fluconazole and griseofulvin  These oral agents are also used to treat tinea capitis (fungal infection of the scalp) and onychomycosis (fungal infection of the nails)  Those with pre-existing liver problems are usually screened for liver function prior to starting oral terbinafine  Tinea pedis can be prevented by making sure feet are clean and dry with protective footwear worn in communal facilities  Other recommendations are:  • Using drying foot powders when wearing occlusive shoes   • Thoroughly dry shoes and boots prior to wearing them   • Making sure to clean contaminated bathroom floors with bleach   • Treatment of family members and other close contacts    The patient was seen and discussed with Dr Maral Saha       RTC: As needed     Jayson Pratt  Dermatology PGY-4 Resident Physician

## 2023-02-22 NOTE — PATIENT INSTRUCTIONS
TINEA PEDIS ("ATHLETE'S FOOT")    Assessment and Plan:  Based on a thorough discussion of this condition and the management approach to it (including a comprehensive discussion of the known risks, side effects and potential benefits of treatment), the patient (family) agrees to implement the following specific plan:  Start topical Lamisil cream topically twice daily to the lesion on the bottom of the left foot for 5 weeks  Then use 2-3 times weekly for the next 3 weeks  Counseled patient on making sure socks are washed with hot water and cleaning shoes to avoid reinfection  Follow-up as needed if spot does not resolve     Tinea Pedis  Tinea pedis is a fungal infection of the foot and is in fact the most common fungal infection  Tinea pedis is caused by dermatophyte fungi with the three most common being Trichophyton (T ) rubrum, T  interdigitale and Epidermophyton floccosum  Tinea pedis most commonly involves the interdigital spaces, known as "athlete's foot " Other typical sites include the toenails, groin, and palms of the hands  There are four major manifestations of tinea pedis including chronic hyperkeratotic, chronic intertriginous, acute ulcerative and vesicobullous  Signs and symptoms include:   Itchiness, redness, and scaling between the toes  Scales covering the soles and sides of the feet  Blisters over the inner aspect of the feet    It is particularly common in hot, tropical, and urban environments where sweating in the feet facilitate fungal growth  Risk factors for development include:  Occlusive footwear  Excessive swearing  Diabetes or other underlying immunosuppression   Poor peripheral circulation     The diagnosis of tinea pedis can usually be made via good history and physical exam due to its characteristic clinical features  Diagnosis can be confirmed by examining skin scrapings under the microscope   Cultures are occasionally done but may not be necessary if fungi are seen under microscopy  Other diagnoses to consider if patients do not respond to therapy include psoriasis, contact dermatitis, and eczema  Tinea pedis can be treated with topical antifungal drugs applied to affected areas on a repeated basis (usually 2 twice a day) for 2 to 4 weeks  Common topical medications include topical ketoconazole, allylamines, butenafine, ciclopirox, and tolnaftate  In cases that do not respond to topical therapy, oral antifungal agents may be used which include terbinafine, itraconazole, fluconazole and griseofulvin  These oral agents are also used to treat tinea capitis (fungal infection of the scalp) and onychomycosis (fungal infection of the nails)  Those with pre-existing liver problems are usually screened for liver function prior to starting oral terbinafine  Tinea pedis can be prevented by making sure feet are clean and dry with protective footwear worn in communal facilities   Other recommendations are:  Using drying foot powders when wearing occlusive shoes   Thoroughly dry shoes and boots prior to wearing them   Making sure to clean contaminated bathroom floors with bleach   Treatment of family members and other close contacts

## 2023-04-12 ENCOUNTER — OFFICE VISIT (OUTPATIENT)
Dept: OBGYN CLINIC | Facility: CLINIC | Age: 48
End: 2023-04-12

## 2023-04-12 VITALS
BODY MASS INDEX: 40.94 KG/M2 | SYSTOLIC BLOOD PRESSURE: 122 MMHG | DIASTOLIC BLOOD PRESSURE: 60 MMHG | HEIGHT: 64 IN | WEIGHT: 239.8 LBS

## 2023-04-12 DIAGNOSIS — N92.6 MISSED PERIOD: Primary | ICD-10-CM

## 2023-04-12 NOTE — PROGRESS NOTES
Assessment/Plan:    No problem-specific Assessment & Plan notes found for this encounter  Diagnoses and all orders for this visit:    Missed period  -     hCG, quantitative; Future        Subjective:      Patient ID: Una Sevilla is a 52 y o  female  Jonathan Alexander is a 72-year-old  female who presents to the office for evaluation of missed period  She reports that her LMP was on 23  Periods previously were regular and predictable  She denies any recent changes to period including changes to menstrual flow or length of periods  Patient reports that she is sexually active  After she missed her period near the end of March, patient took a home pregnancy test which resulted positive  She does note history of tubal ligation about 14 years ago  She also notes a couple of days of intermittent epigastric abdominal pain during that time which has since resolved and has not returned  She denies any dietary changes, bladder problems, or bowel problems  Also denies any changes to vaginal discharge or pelvic pain  Will plan to obtain quantitative hCG and pelvic ultrasound for further evaluation  Advised patient to visit her PCP if she develops recurrent epigastric abdominal pain  Patient encouraged to call the office with any questions or concerns  Total time of today's visit was 20 minutes of which greater than 50% was spent face-to-face counseling the patient as well as coordination of care, review of chart lab values, physical evaluation as well as computer entry into the epic medical record system  The following portions of the patient's history were reviewed and updated as appropriate: allergies, current medications, past family history, past medical history, past social history, past surgical history and problem list     Review of Systems   Constitutional: Negative  Negative for appetite change, chills, diaphoresis, fatigue, fever and unexpected weight change     HENT: "Negative  Eyes: Negative  Respiratory: Negative  Negative for shortness of breath  Cardiovascular: Negative  Negative for chest pain  Gastrointestinal: Negative  Negative for abdominal pain, blood in stool, constipation, diarrhea, nausea and vomiting  Endocrine: Negative  Negative for cold intolerance and heat intolerance  Genitourinary: Negative  Negative for dysuria, flank pain, frequency, hematuria, urgency, vaginal bleeding, vaginal discharge and vaginal pain  Musculoskeletal: Negative  Skin: Negative  Allergic/Immunologic: Negative  Neurological: Negative  Hematological: Negative  Negative for adenopathy  Psychiatric/Behavioral: Negative  Objective:      /60   Ht 5' 4\" (1 626 m)   Wt 109 kg (239 lb 12 8 oz)   LMP 02/22/2023 (Exact Date)   BMI 41 16 kg/m²          Physical Exam  Exam conducted with a chaperone present  Constitutional:       General: She is not in acute distress  Appearance: Normal appearance  HENT:      Head: Normocephalic  Pulmonary:      Effort: Pulmonary effort is normal  No respiratory distress  Abdominal:      General: Abdomen is flat  Bowel sounds are normal       Palpations: Abdomen is soft  Tenderness: There is no abdominal tenderness  Genitourinary:     General: Normal vulva  Labia:         Right: No rash, tenderness, lesion or injury  Left: No rash, tenderness, lesion or injury  Urethra: No urethral swelling or urethral lesion  Vagina: Normal       Cervix: Normal       Uterus: Not enlarged and not tender  Adnexa:         Right: No mass, tenderness or fullness  Left: No mass, tenderness or fullness  Musculoskeletal:         General: No signs of injury  Normal range of motion  Cervical back: Normal range of motion and neck supple  Skin:     General: Skin is warm and dry  Neurological:      General: No focal deficit present        Mental Status: She is alert and " oriented to person, place, and time  Psychiatric:         Mood and Affect: Mood normal          Behavior: Behavior normal          Thought Content:  Thought content normal          Judgment: Judgment normal

## 2023-04-12 NOTE — PATIENT INSTRUCTIONS
Topic: missed period    Plan to obtain quantitative hCG and pelvic ultrasound in light of missed period and positive qualitative home pregnancy test     Patient to call the office with any questions or concerns

## 2023-04-27 ENCOUNTER — TELEPHONE (OUTPATIENT)
Dept: PSYCHIATRY | Facility: CLINIC | Age: 48
End: 2023-04-27

## 2023-05-24 ENCOUNTER — OFFICE VISIT (OUTPATIENT)
Dept: FAMILY MEDICINE CLINIC | Facility: CLINIC | Age: 48
End: 2023-05-24

## 2023-05-24 VITALS
HEIGHT: 64 IN | RESPIRATION RATE: 16 BRPM | OXYGEN SATURATION: 97 % | WEIGHT: 241.2 LBS | SYSTOLIC BLOOD PRESSURE: 120 MMHG | BODY MASS INDEX: 41.18 KG/M2 | DIASTOLIC BLOOD PRESSURE: 80 MMHG | HEART RATE: 74 BPM | TEMPERATURE: 98.3 F

## 2023-05-24 DIAGNOSIS — Z13.1 SCREENING FOR DIABETES MELLITUS (DM): ICD-10-CM

## 2023-05-24 DIAGNOSIS — R73.01 IMPAIRED FASTING BLOOD SUGAR: Primary | ICD-10-CM

## 2023-05-24 DIAGNOSIS — D06.9 CIN III (CERVICAL INTRAEPITHELIAL NEOPLASIA III): ICD-10-CM

## 2023-05-24 DIAGNOSIS — Z13.220 SCREENING FOR HYPERLIPIDEMIA: ICD-10-CM

## 2023-05-24 DIAGNOSIS — F31.77 BIPOLAR DISORDER, IN PARTIAL REMISSION, MOST RECENT EPISODE MIXED (HCC): ICD-10-CM

## 2023-05-24 NOTE — PROGRESS NOTES
"Assessment/Plan:    Family history of colon cancer  Completed a colonoscopy 11/2021 with a repeat in 4 yrs due to family history  LIZETH III (cervical intraepithelial neoplasia III)  FU with gyn for a repeat pap  She has a ho HGSIL grade 2 LIZETH s/p cold knife cone and vulvar biopsies 2/8/22  The margins were negative and she will repeat the pap after 1 year  Bipolar disorder ([address] )  Well controlled off medication  She was told she has the \"good\" bipolar  Likely she has bipolar 2  Diagnoses and all orders for this visit:    Impaired fasting blood sugar  -     Hemoglobin A1C; Future    Screening for hyperlipidemia  -     Lipid panel; Future    Screening for diabetes mellitus (DM)  -     Basic metabolic panel; Future    Bipolar disorder, in partial remission, most recent episode mixed (HCC)    Body mass index (BMI) 40 0-44 9, adult (HCC)    LIZETH III (cervical intraepithelial neoplasia III)          Subjective: chronic conditions      Patient ID: Curly Hawthorne is a 52 y o  female with a ho morbid obesity, cervical dysplasia, bipolar disorder, family ho colon cancer  HPI  Pt is concerned about her gallbladder  She was complaining of abdominal pain a couple weeks ago that was present in the epigastrium and moved to the right upper quadrant  The pain was constant for about 2 days and then self resolved and returned for another 2 days  No repeat symptoms  She didn't use any over the counter medication  The following portions of the patient's history were reviewed and updated as appropriate: allergies, current medications, past family history, past medical history, past social history, past surgical history and problem list     Review of Systems   Constitutional: Negative for fever and unexpected weight change  HENT: Negative for ear pain, sore throat and trouble swallowing  Eyes: Negative for pain and visual disturbance     Respiratory: Negative for cough, chest tightness, shortness of breath " "and wheezing  Cardiovascular: Negative for chest pain  Gastrointestinal: Negative for abdominal distention, abdominal pain, blood in stool, constipation, diarrhea, nausea and vomiting  Endocrine: Negative for polydipsia and polyuria  Genitourinary: Negative for dysuria and hematuria  Musculoskeletal: Negative for back pain and myalgias  Skin: Negative for rash  Neurological: Negative for syncope and headaches  Psychiatric/Behavioral: Negative for suicidal ideas  PHQ-2/9 Depression Screening         [unfilled]    Objective:      /80 (BP Location: Left arm, Patient Position: Sitting, Cuff Size: Large)   Pulse 74   Temp 98 3 °F (36 8 °C) (Tympanic)   Resp 16   Ht 5' 4\" (1 626 m)   Wt 109 kg (241 lb 3 2 oz)   SpO2 97%   BMI 41 40 kg/m²          Physical Exam  Constitutional:       Appearance: She is well-developed  HENT:      Head: Normocephalic and atraumatic  Right Ear: External ear normal       Left Ear: External ear normal       Mouth/Throat:      Pharynx: No oropharyngeal exudate  Eyes:      General: No scleral icterus  Conjunctiva/sclera: Conjunctivae normal       Pupils: Pupils are equal, round, and reactive to light  Cardiovascular:      Rate and Rhythm: Normal rate and regular rhythm  Heart sounds: No murmur heard  No friction rub  No gallop  Pulmonary:      Effort: Pulmonary effort is normal  No respiratory distress  Breath sounds: Normal breath sounds  No wheezing or rales  Abdominal:      General: Bowel sounds are normal  There is no distension  Palpations: Abdomen is soft  There is no mass  Tenderness: There is no abdominal tenderness  There is no rebound  Musculoskeletal:         General: Normal range of motion  Cervical back: Normal range of motion and neck supple  Skin:     General: Skin is warm and dry  Neurological:      Mental Status: She is alert and oriented to person, place, and time           "

## 2023-05-24 NOTE — ASSESSMENT & PLAN NOTE
"Well controlled off medication  She was told she has the \"good\" bipolar  Likely she has bipolar 2     "

## 2023-05-24 NOTE — ASSESSMENT & PLAN NOTE
FU with gyn for a repeat pap  She has a ho HGSIL grade 2 LIZETH s/p cold knife cone and vulvar biopsies 2/8/22  The margins were negative and she will repeat the pap after 1 year

## 2023-06-13 ENCOUNTER — OFFICE VISIT (OUTPATIENT)
Dept: OBGYN CLINIC | Facility: CLINIC | Age: 48
End: 2023-06-13
Payer: MEDICARE

## 2023-06-13 VITALS
WEIGHT: 240 LBS | SYSTOLIC BLOOD PRESSURE: 122 MMHG | BODY MASS INDEX: 40.97 KG/M2 | DIASTOLIC BLOOD PRESSURE: 78 MMHG | HEIGHT: 64 IN

## 2023-06-13 DIAGNOSIS — N87.1 HIGH GRADE SQUAMOUS INTRAEPITHELIAL LESION (HGSIL), GRADE 2 CIN, ON BIOPSY OF CERVIX: Primary | ICD-10-CM

## 2023-06-13 DIAGNOSIS — L90.0 LICHEN SCLEROSUS: ICD-10-CM

## 2023-06-13 PROCEDURE — G0476 HPV COMBO ASSAY CA SCREEN: HCPCS | Performed by: OBSTETRICS & GYNECOLOGY

## 2023-06-13 PROCEDURE — G0145 SCR C/V CYTO,THINLAYER,RESCR: HCPCS | Performed by: OBSTETRICS & GYNECOLOGY

## 2023-06-13 PROCEDURE — 99214 OFFICE O/P EST MOD 30 MIN: CPT | Performed by: OBSTETRICS & GYNECOLOGY

## 2023-06-13 NOTE — PROGRESS NOTES
Assessment/Plan:    No problem-specific Assessment & Plan notes found for this encounter  There are no diagnoses linked to this encounter  High-grade squamous intraepithelial lesion grade 2 LIZETH  on biopsy of cervix    Lichen sclerosus        Subjective:      Patient ID: Balbir Connelly is a 50 y o  female  Sury Cherry is a 29-year-old female with past medical history of bipolar disorder, PTSD, and abnormal Pap presenting for a 6-month follow-up  She states that in the past her Pap smear came back suggestive of high grade cervical abnormalities  She received a biopsy in office as well as a cold knife cone and vulvar biopsy in order to remove the abnormal areas on the cervix  Her last Pap was 12/14/2022 which came back benign other than possible yeast infection  We will continue checking her every 6 months with a Pap smear due to her high-grade lesion in the past   She states that her periods are regular her last 1 was June 6  She says they usually occur every 25 to 28 days and last 5 to 7 days each time  She performs self breast exams at home and states she has not noticed any masses, areas of tenderness, discoloration, or nipple changes  She is up-to-date on her mammograms and the last one was completely benign  We will see her back in 6 months for her full yearly appointment      Total time of today's visit was 25 minutes of which greater than 50% was spent face-to-face counseling the patient as well as coordination of care, review of chart and laboratory values, discussion of biopsy results and management plan, physical examination with Pap smear as well as computer entry into the epic medical record system  The following portions of the patient's history were reviewed and updated as appropriate: allergies, current medications, past family history, past medical history, past social history, past surgical history and problem list     Review of Systems   Constitutional: Negative    Negative "for appetite change, diaphoresis, fatigue, fever and unexpected weight change  HENT: Negative  Eyes: Negative  Respiratory: Negative  Cardiovascular: Negative  Gastrointestinal: Negative  Negative for abdominal pain, blood in stool, constipation, diarrhea, nausea and vomiting  Endocrine: Negative  Negative for cold intolerance and heat intolerance  Genitourinary: Negative  Negative for dysuria, frequency, hematuria, urgency, vaginal bleeding, vaginal discharge and vaginal pain  Musculoskeletal: Negative  Skin: Negative  Allergic/Immunologic: Negative  Neurological: Negative  Hematological: Negative  Negative for adenopathy  Psychiatric/Behavioral: Negative  Objective:      /78   Ht 5' 4\" (1 626 m)   Wt 109 kg (240 lb)   LMP 06/06/2023 (Exact Date)   BMI 41 20 kg/m²          Physical Exam  Constitutional:       Appearance: Normal appearance  She is well-developed  HENT:      Head: Normocephalic  Eyes:      Pupils: Pupils are equal, round, and reactive to light  Cardiovascular:      Rate and Rhythm: Normal rate and regular rhythm  Pulmonary:      Effort: Pulmonary effort is normal       Breath sounds: Normal breath sounds  Chest:   Breasts:     Breasts are symmetrical       Right: No inverted nipple, mass, nipple discharge, skin change or tenderness  Left: No inverted nipple, mass, nipple discharge, skin change or tenderness  Abdominal:      General: Bowel sounds are normal       Palpations: Abdomen is soft  Genitourinary:     General: Normal vulva  Exam position: Supine  Labia:         Right: No rash, tenderness, lesion or injury  Left: No rash, tenderness, lesion or injury  Urethra: No urethral swelling or urethral lesion  Vagina: Normal  No erythema, tenderness or bleeding  Cervix: No discharge or friability  Uterus: Not enlarged, not fixed and not tender         Adnexa:         Right: No mass, " tenderness or fullness  Left: No mass, tenderness or fullness  Rectum: Normal  Guaiac result negative  Comments: Lichen sclerosus changes remain stable  Musculoskeletal:         General: Normal range of motion  Cervical back: Normal range of motion and neck supple  Lymphadenopathy:      Cervical: No cervical adenopathy  Upper Body:      Right upper body: No supraclavicular adenopathy  Left upper body: No supraclavicular adenopathy  Skin:     General: Skin is warm and dry  Findings: No rash  Neurological:      General: No focal deficit present  Mental Status: She is alert and oriented to person, place, and time  Psychiatric:         Mood and Affect: Mood normal          Speech: Speech normal          Behavior: Behavior normal          Thought Content: Thought content normal          Judgment: Judgment normal       Comments:  Followed for bipolar disorder and PTSD

## 2023-06-13 NOTE — PATIENT INSTRUCTIONS
Normal gynecological physical examination  We will continue to repeat the Pap smear every 6 months due to a past finding of high-grade lesion on the cervix  Self-breast examination stressed  Mammogram ordered  Discussed regular exercise, healthy diet, importance of vitamin D and calcium supplements  Discussed importance of sun block use during periods of prolonged sun exposure  All patient's questions answered in office today  Patient will be seen in 6 months for routine gynecologic and medical examination  Patient will call office for any problems, concerns, or issues which may arise during the interim

## 2023-06-16 LAB
HPV HR 12 DNA CVX QL NAA+PROBE: NEGATIVE
HPV16 DNA CVX QL NAA+PROBE: NEGATIVE
HPV18 DNA CVX QL NAA+PROBE: NEGATIVE

## 2023-06-21 ENCOUNTER — TELEPHONE (OUTPATIENT)
Dept: OBGYN CLINIC | Facility: CLINIC | Age: 48
End: 2023-06-21

## 2023-06-21 LAB
LAB AP GYN PRIMARY INTERPRETATION: NORMAL
Lab: NORMAL
PATH INTERP SPEC-IMP: NORMAL

## 2023-06-21 NOTE — TELEPHONE ENCOUNTER
----- Message from Steve Macias MD sent at 6/20/2023  1:32 PM EDT -----  Viral screening on the Pap smear was negative    Thanks

## 2023-06-26 DIAGNOSIS — B96.89 BV (BACTERIAL VAGINOSIS): Primary | ICD-10-CM

## 2023-06-26 DIAGNOSIS — N76.0 BV (BACTERIAL VAGINOSIS): Primary | ICD-10-CM

## 2023-06-26 RX ORDER — METRONIDAZOLE 500 MG/1
500 TABLET ORAL EVERY 12 HOURS SCHEDULED
Qty: 14 TABLET | Refills: 0 | Status: SHIPPED | OUTPATIENT
Start: 2023-06-26 | End: 2023-06-28 | Stop reason: SINTOL

## 2023-06-26 NOTE — TELEPHONE ENCOUNTER
----- Message from Isaura Irizarry MD sent at 6/26/2023  1:44 PM EDT -----  Pap smear was negative but showed signs of bacterial vaginosis    Please treat with Flagyl 500 mg dispense 14 tablets take 1 tablet twice daily for 7 days    Thanks

## 2023-06-28 ENCOUNTER — HOSPITAL ENCOUNTER (EMERGENCY)
Facility: HOSPITAL | Age: 48
Discharge: HOME | End: 2023-06-28
Attending: EMERGENCY MEDICINE
Payer: MEDICARE

## 2023-06-28 VITALS
BODY MASS INDEX: 41.2 KG/M2 | TEMPERATURE: 97.6 F | HEART RATE: 90 BPM | OXYGEN SATURATION: 98 % | RESPIRATION RATE: 18 BRPM | DIASTOLIC BLOOD PRESSURE: 94 MMHG | WEIGHT: 240 LBS | SYSTOLIC BLOOD PRESSURE: 122 MMHG

## 2023-06-28 DIAGNOSIS — B96.89 BACTERIAL VAGINOSIS: ICD-10-CM

## 2023-06-28 DIAGNOSIS — N76.0 BACTERIAL VAGINOSIS: ICD-10-CM

## 2023-06-28 DIAGNOSIS — L50.0 ALLERGIC URTICARIA: Primary | ICD-10-CM

## 2023-06-28 RX ORDER — CLINDAMYCIN HYDROCHLORIDE 300 MG/1
300 CAPSULE ORAL 2 TIMES DAILY
Qty: 14 CAPSULE | Refills: 0 | Status: SHIPPED | OUTPATIENT
Start: 2023-06-28 | End: 2023-07-05

## 2023-06-28 RX ORDER — PREDNISONE 20 MG/1
40 TABLET ORAL DAILY
Qty: 3 TABLET | Refills: 0 | Status: SHIPPED | OUTPATIENT
Start: 2023-06-28 | End: 2023-06-30

## 2023-06-28 RX ORDER — CETIRIZINE HYDROCHLORIDE 10 MG/1
10 TABLET ORAL DAILY
Qty: 7 TABLET | Refills: 0 | Status: SHIPPED | OUTPATIENT
Start: 2023-06-28 | End: 2023-07-05

## 2023-06-28 RX ADMIN — PREDNISONE 50 MG: 20 TABLET ORAL at 10:55

## 2023-06-28 NOTE — ED PROVIDER NOTES
History  Chief Complaint   Patient presents with   • Rash     Pt started Flagyl on Monday today awoke with itchy rash over entire body, no resp distress, no fever     Elsie Young is a 50year old female with no pertinent PMHx presenting to the ED for a pruritic rash that developed this morning/overnight  Diagnosed with bacterial vaginosis and prescribed Flagyl 2 days ago and has been taking as directed  The rash is pruritic, across her entire body but sparing her face  The BV symptoms have decreased  Denies throat closing, difficulty swallowing, difficulty breathing  Prior to Admission Medications   Prescriptions Last Dose Informant Patient Reported? Taking? clobetasol (TEMOVATE) 0 05 % ointment   No No   Sig: Apply topically in the morning for 28 days Clobetasol application: Apply to affected area at night for four weeks, then every other night for four weeks, then twice weekly until follow up   terbinafine (LamISIL) 1 % cream  Self No No   Sig: Start topical Lamisil cream topically twice daily to the lesion on the bottom of the left foot for 5 weeks  Then use 2-3 times weekly for the next 3 weeks        Facility-Administered Medications: None       Past Medical History:   Diagnosis Date   • Anemia     Last assessed 9/25/2014   • Anxiety    • Bipolar 1 disorder ([facility] [address] )    • Class 2 obesity due to excess calories without serious comorbidity with body mass index (BMI) of 37 0 to 37 9 in adult 8/19/2019   • Depression    • Diastasis recti     Last assessed 1/19/2017   • Migraine     Last assessed 11/19/2014   • Obesity (BMI 35 0-39 9 without comorbidity) 12/28/2018   • PTSD (post-traumatic stress disorder)        Past Surgical History:   Procedure Laterality Date   • BREAST EXCISIONAL BIOPSY Left     age 12   • BREAST SURGERY      Lumpectomy   • CERVICAL BIOPSY N/A 1/20/2022    Procedure: COLD KNIFE CONE AND VULVAR BIOPSIES;  Surgeon: Gui rOopeza MD;  Location: [facility];  Service: Gynecology Oncology   •  SECTION     • LAPAROSCOPY FOR ECTOPIC PREGNANCY     • TUBAL LIGATION         Family History   Problem Relation Age of Onset   • Diabetes Mother    • Hyperlipidemia Mother    • Hypertension Mother    • Ovarian cancer Mother 76   • Alcohol abuse Father    • Substance Abuse Father    • Mental illness Father    • Colon cancer Sister    • Leukemia Maternal Grandmother    • Breast cancer Maternal Aunt         unknown age   • No Known Problems Maternal Grandfather    • No Known Problems Paternal Grandmother    • No Known Problems Paternal Grandfather    • No Known Problems Son    • No Known Problems Son    • No Known Problems Son    • No Known Problems Son    • No Known Problems Sister    • Breast cancer Maternal Aunt         unknown age   • Breast cancer Maternal Aunt         unknown age   • Breast cancer Maternal Aunt         unknown age   • Uterine cancer Maternal Aunt         unknonw age   • No Known Problems Paternal Aunt    • No Known Problems Paternal Aunt    • No Known Problems Paternal Aunt    • Testicular cancer Cousin 29   • Cancer Maternal Aunt      I have reviewed and agree with the history as documented  E-Cigarette/Vaping   • E-Cigarette Use Never User      E-Cigarette/Vaping Substances   • Nicotine No    • THC No    • CBD No    • Flavoring No    • Other No    • Unknown No      Social History     Tobacco Use   • Smoking status: Never   • Smokeless tobacco: Never   Vaping Use   • Vaping Use: Never used   Substance Use Topics   • Alcohol use: Yes     Comment: Rarely   • Drug use: Never       Review of Systems   Constitutional: Negative for chills and fever  HENT: Negative for congestion, mouth sores, sore throat, trouble swallowing and voice change  Eyes: Negative for photophobia and visual disturbance  Respiratory: Negative for cough, choking, chest tightness, shortness of breath and wheezing  Cardiovascular: Negative for chest pain and palpitations     Gastrointestinal: Negative for abdominal pain, diarrhea, nausea and vomiting  Musculoskeletal: Negative for myalgias, neck pain and neck stiffness  Skin: Positive for rash  Neurological: Negative for tremors, weakness, light-headedness, numbness and headaches  Hematological: Negative for adenopathy  Physical Exam  Physical Exam  Vitals reviewed  Constitutional:       General: She is not in acute distress  Appearance: Normal appearance  She is ill-appearing  She is not toxic-appearing or diaphoretic  Interventions: She is not intubated  HENT:      Head: Normocephalic and atraumatic  Right Ear: External ear normal       Left Ear: External ear normal       Nose: Nose normal       Mouth/Throat:      Mouth: Mucous membranes are moist       Pharynx: Oropharynx is clear  Uvula midline  No pharyngeal swelling, oropharyngeal exudate, posterior oropharyngeal erythema or uvula swelling  Tonsils: No tonsillar exudate or tonsillar abscesses  Comments: Pharynx is patent  No anaphylaxis  Eyes:      General: No scleral icterus  Right eye: No discharge  Left eye: No discharge  Extraocular Movements: Extraocular movements intact  Conjunctiva/sclera: Conjunctivae normal    Cardiovascular:      Rate and Rhythm: Normal rate and regular rhythm  Pulses: Normal pulses  Heart sounds: Normal heart sounds  Pulmonary:      Effort: Pulmonary effort is normal  No tachypnea, bradypnea, accessory muscle usage, prolonged expiration or respiratory distress  She is not intubated  Breath sounds: Normal breath sounds and air entry  No decreased air movement  No decreased breath sounds, wheezing, rhonchi or rales  Abdominal:      General: Abdomen is flat  Musculoskeletal:         General: Normal range of motion  Cervical back: Normal range of motion and neck supple  No rigidity or tenderness  Lymphadenopathy:      Cervical: No cervical adenopathy  Skin:     General: Skin is warm and dry  Capillary Refill: Capillary refill takes less than 2 seconds  Findings: Rash present  Rash is urticarial       Comments: Blanchable rash across the entire body, sparing the face  Small pinpoint urticarial lesions  No open wounds or cellulitis  Primarily the rash is most notable to the bilateral legs, some to the arms  Neurological:      General: No focal deficit present  Mental Status: She is alert  Psychiatric:         Mood and Affect: Mood normal          Behavior: Behavior normal          Vital Signs  ED Triage Vitals   Temperature Pulse Respirations Blood Pressure SpO2   06/28/23 1024 06/28/23 1022 06/28/23 1022 06/28/23 1022 06/28/23 1022   97 6 °F (36 4 °C) 90 18 122/94 98 %      Temp Source Heart Rate Source Patient Position - Orthostatic VS BP Location FiO2 (%)   06/28/23 1024 06/28/23 1022 06/28/23 1022 06/28/23 1022 --   Tympanic Monitor Sitting Left arm       Pain Score       --                  Vitals:    06/28/23 1022   BP: 122/94   Pulse: 90   Patient Position - Orthostatic VS: Sitting         Visual Acuity      ED Medications  Medications   predniSONE tablet 50 mg (50 mg Oral Given 6/28/23 1055)       Diagnostic Studies  Results Reviewed     None                 No orders to display              Procedures  Procedures         ED Course                                             Medical Decision Making  50year old female presenting for an allergic skin reaction to (suspected) Flagyl for BV  Symptoms began overnight, she woke up with them  Itchy urticaria on exam  Given a dose of prednisone in the ED, discharged home with rx for prednisone, Zrytec, and changed the Flagyl abx treatment to Clindamycin per second line treatment  Discussed side effects of this medication  No Benadryl given in the ED because pt drove, states she has some at home that she will take  Patient without anaphylaxis, facial swelling, difficulty swallowing, difficulty breathing, syncope   Pt stable at time of discharge, vital signs reviewed, questions answered  Strict ER return precautions provided/discussed and were well understood by patient  Allergic urticaria: acute illness or injury  Bacterial vaginosis: acute illness or injury  Amount and/or Complexity of Data Reviewed  External Data Reviewed: labs and notes  Details: Reviewed notes and labs from the bacterial vaginosis diagnosis  Risk  OTC drugs  Prescription drug management  Disposition  Final diagnoses: Allergic urticaria   Bacterial vaginosis     Time reflects when diagnosis was documented in both MDM as applicable and the Disposition within this note     Time User Action Codes Description Comment    6/28/2023 10:49 AM Andres Paredes Add [L50 0] Allergic urticaria     6/28/2023 10:49 AM Andres Paredes Add [N76 0,  B96 89] Bacterial vaginosis       ED Disposition     ED Disposition   Discharge    Condition   Stable    Date/Time   Wed Jun 28, 2023 10:48 AM    Comment   Pablo Wells discharge to home/self care  Follow-up Information     Follow up With Specialties Details Why [address], MD Family Medicine Schedule an appointment as soon as possible for a visit  Follow up [address] [facility] Emergency Department Emergency Medicine Go to  If symptoms worsen [address] [id number]  [address] [facility] Emergency Department, [address], [address]          Discharge Medication List as of 6/28/2023 11:00 AM      START taking these medications    Details   cetirizine (ZyrTEC) 10 mg tablet Take 1 tablet (10 mg total) by mouth daily for 7 days, Starting Wed 6/28/2023, Until Wed 7/5/2023, Normal      clindamycin (CLEOCIN) 300 MG capsule Take 1 capsule (300 mg total) by mouth 2 (two) times a day for 7 days, Starting Wed 6/28/2023, Until Wed 7/5/2023, Normal      predniSONE 20 mg tablet Take 2 tablets (40 mg total) by mouth daily for 2 days Take 2 tablets tomorrow, 1 tablet the day after , Starting Wed 6/28/2023, Until Fri 6/30/2023, Normal         CONTINUE these medications which have NOT CHANGED    Details   clobetasol (TEMOVATE) 0 05 % ointment Apply topically in the morning for 28 days Clobetasol application: Apply to affected area at night for four weeks, then every other night for four weeks, then twice weekly until follow up, Starting Tue 2/8/2022, Until Wed 12/14/2022, Normal      terbinafine (LamISIL) 1 % cream Start topical Lamisil cream topically twice daily to the lesion on the bottom of the left foot for 5 weeks  Then use 2-3 times weekly for the next 3 weeks  , Normal             No discharge procedures on file      PDMP Review     None          ED Provider  Electronically Signed by           Luh Avina PA-C  06/28/23 [id number]

## 2023-06-28 NOTE — DISCHARGE INSTRUCTIONS
Stop taking Flagyl and take Clindamycin two times a day for 7 days instead  Take prednisone to help the hives  First dose given in the ER so do not repeat today  Take 2 tablets tomorrow, then one tablet the day after  Take early in the day because this medication can make it difficult to sleep  Take Zrytec daily for 7 days  Continue with Benadryl at home  Return to the ER for: passing out, lethargy, throat closing, difficulty breathing  You may experience an upset stomach with the new antibiotic, this is expected but if you have diffuse diarrhea for the time being after finishing the antibiotic come back to the ER

## 2023-06-29 ENCOUNTER — VBI (OUTPATIENT)
Dept: ADMINISTRATIVE | Facility: OTHER | Age: 48
End: 2023-06-29

## 2023-06-29 NOTE — TELEPHONE ENCOUNTER
Melvina Becerra    ED Visit Information     Ed visit date:06/28/23  Diagnosis Description: rash  In Network? Yes [facility]  Discharge status: Home  Discharged with meds ? Yes  Number of ED visits to date: 1  ED Severity:4     Outreach Information    Outreach successful: Yes 1  Date letter mailed:no  Date Finalized:6/29/23    Care Coordination    Follow up appointment with pcp: no will call if needed  Transportation issues ?  No    [employer] type: 3 Day Outreach  Emergent necessity warranted by diagnosis: Yes  [facility] PCP: Yes  Transportation: Self Transport  Called PCP first?: No  Feels able to call PCP for urgent problems ?: Yes  Understands what emergencies can be handled by PCP ?: Yes  Ever any problems getting appointment with PCP for minor emergency/urgency problems?: No  Practice Contacted Patient ?: No  Pt had ED follow up with pcp/staff ?: No    Reason Patient went to ED instead of Urgent Care or PCP?: Perceived Severity of Illness  Urgent care Education?: Yes  Call Complete - spoke with patient and she stopped medication - will call if needed

## 2023-08-26 ENCOUNTER — HOSPITAL ENCOUNTER (EMERGENCY)
Facility: HOSPITAL | Age: 48
Discharge: HOME | End: 2023-08-26
Attending: EMERGENCY MEDICINE
Payer: MEDICARE

## 2023-08-26 VITALS
DIASTOLIC BLOOD PRESSURE: 80 MMHG | OXYGEN SATURATION: 98 % | SYSTOLIC BLOOD PRESSURE: 164 MMHG | RESPIRATION RATE: 18 BRPM | TEMPERATURE: 98.4 F | HEART RATE: 89 BPM

## 2023-08-26 DIAGNOSIS — L23.7 POISON IVY DERMATITIS: Primary | ICD-10-CM

## 2023-08-26 PROCEDURE — 99284 EMERGENCY DEPT VISIT MOD MDM: CPT | Performed by: EMERGENCY MEDICINE

## 2023-08-26 PROCEDURE — 99283 EMERGENCY DEPT VISIT LOW MDM: CPT

## 2023-08-26 RX ORDER — PREDNISONE 20 MG/1
40 TABLET ORAL DAILY
Qty: 12 TABLET | Refills: 0 | Status: SHIPPED | OUTPATIENT
Start: 2023-08-27 | End: 2023-09-02

## 2023-08-26 RX ORDER — PREDNISONE 20 MG/1
40 TABLET ORAL ONCE
Status: COMPLETED | OUTPATIENT
Start: 2023-08-26 | End: 2023-08-26

## 2023-08-26 RX ADMIN — PREDNISONE 40 MG: 20 TABLET ORAL at 11:19

## 2023-08-26 NOTE — ED PROVIDER NOTES
History  Chief Complaint   Patient presents with   • Rash     Pt believes she had poison Ivy or [place]. She denies exposure. She states calming lotions and benadryl have not helped. Rash is a week old and spreading. 49-year-old female resents to ED for evaluation of itchy rash x1 week patient states that she initially noticed the rash on her right wrist.  It then spread up her right arm, and appeared on her left arm, feet, and ankles. Patient states that a week ago she took a walk park. She denies contact with poison ivy, but is unsure if she touched anything else. Patient states that she had a severe reaction to poison ivy as a child. She denies shortness of breath, cough, nausea, vomiting, swelling fever, chills. She denies new medication use or changes in her daily routine. Prior to Admission Medications   Prescriptions Last Dose Informant Patient Reported? Taking? cetirizine (ZyrTEC) 10 mg tablet   No No   Sig: Take 1 tablet (10 mg total) by mouth daily for 7 days   clobetasol (TEMOVATE) 0.05 % ointment   No No   Sig: Apply topically in the morning for 28 days Clobetasol application: Apply to affected area at night for four weeks, then every other night for four weeks, then twice weekly until follow up   terbinafine (LamISIL) 1 % cream  Self No No   Sig: Start topical Lamisil cream topically twice daily to the lesion on the bottom of the left foot for 5 weeks. Then use 2-3 times weekly for the next 3 weeks.       Facility-Administered Medications: None       Past Medical History:   Diagnosis Date   • Anemia     Last assessed 9/25/2014   • Anxiety    • Bipolar 1 disorder ([address])    • Class 2 obesity due to excess calories without serious comorbidity with body mass index (BMI) of 37.0 to 37.9 in adult 8/19/2019   • Depression    • Diastasis recti     Last assessed 1/19/2017   • Migraine     Last assessed 11/19/2014   • Obesity (BMI 35.0-39.9 without comorbidity) 12/28/2018   • PTSD (post-traumatic stress disorder)        Past Surgical History:   Procedure Laterality Date   • BREAST EXCISIONAL BIOPSY Left     age 12   • BREAST SURGERY      Lumpectomy   • CERVICAL BIOPSY N/A 2022    Procedure: COLD KNIFE CONE AND VULVAR BIOPSIES;  Surgeon: Casper Parks MD;  Location: [facility];  Service: Gynecology Oncology   •  SECTION     • LAPAROSCOPY FOR ECTOPIC PREGNANCY     • TUBAL LIGATION         Family History   Problem Relation Age of Onset   • Diabetes Mother    • Hyperlipidemia Mother    • Hypertension Mother    • Ovarian cancer Mother 76   • Alcohol abuse Father    • Substance Abuse Father    • Mental illness Father    • Colon cancer Sister    • Leukemia Maternal Grandmother    • Breast cancer Maternal Aunt         unknown age   • No Known Problems Maternal Grandfather    • No Known Problems Paternal Grandmother    • No Known Problems Paternal Grandfather    • No Known Problems Son    • No Known Problems Son    • No Known Problems Son    • No Known Problems Son    • No Known Problems Sister    • Breast cancer Maternal Aunt         unknown age   • Breast cancer Maternal Aunt         unknown age   • Breast cancer Maternal Aunt         unknown age   • Uterine cancer Maternal Aunt         unknonw age   • No Known Problems Paternal Aunt    • No Known Problems Paternal Aunt    • No Known Problems Paternal Aunt    • Testicular cancer Cousin 29   • Cancer Maternal Aunt      I have reviewed and agree with the history as documented. E-Cigarette/Vaping   • E-Cigarette Use Never User      E-Cigarette/Vaping Substances   • Nicotine No    • THC No    • CBD No    • Flavoring No    • Other No    • Unknown No      Social History     Tobacco Use   • Smoking status: Never   • Smokeless tobacco: Never   Vaping Use   • Vaping Use: Never used   Substance Use Topics   • Alcohol use: Yes     Comment: Rarely   • Drug use: Never        Review of Systems   Constitutional: Negative for chills and fever.    HENT: Negative for congestion, sore throat, trouble swallowing and voice change. Eyes: Negative for pain and redness. Respiratory: Negative for cough, shortness of breath and wheezing. Cardiovascular: Negative for chest pain. Gastrointestinal: Negative for abdominal pain, diarrhea, nausea and vomiting. Genitourinary: Negative for difficulty urinating and flank pain. Musculoskeletal: Negative for joint swelling. Skin: Positive for rash. Neurological: Negative for weakness, light-headedness, numbness and headaches. Psychiatric/Behavioral: Negative for behavioral problems and confusion. Physical Exam  ED Triage Vitals [08/26/23 1030]   Temperature Pulse Respirations Blood Pressure SpO2   98.4 °F (36.9 °C) 89 18 164/80 98 %      Temp Source Heart Rate Source Patient Position - Orthostatic VS BP Location FiO2 (%)   Tympanic -- Sitting Left arm --      Pain Score       2             Orthostatic Vital Signs  Vitals:    08/26/23 1030   BP: 164/80   Pulse: 89   Patient Position - Orthostatic VS: Sitting       Physical Exam  Vitals and nursing note reviewed. Constitutional:       General: She is not in acute distress. Appearance: Normal appearance. She is normal weight. She is not toxic-appearing. HENT:      Head: Normocephalic and atraumatic. Nose: No rhinorrhea. Mouth/Throat:      Mouth: Mucous membranes are moist.      Pharynx: Oropharynx is clear. Eyes:      Conjunctiva/sclera: Conjunctivae normal.   Cardiovascular:      Rate and Rhythm: Normal rate and regular rhythm. Pulses: Normal pulses. Heart sounds: Normal heart sounds. Pulmonary:      Effort: Pulmonary effort is normal. No respiratory distress. Breath sounds: No wheezing. Abdominal:      General: Abdomen is flat. Palpations: Abdomen is soft. Tenderness: There is no abdominal tenderness. Musculoskeletal:      Comments: Moving all extremities equally   Skin:     General: Skin is warm and dry.       Capillary Refill: Capillary refill takes less than 2 seconds. Findings: Rash (Urticarial rash on the right forearm and arm, left forearm and arm, bilateral feet, ankles, and lower legs. Some of the lesions are crusted over. No surrounding swelling or erythema. No involvement of the face, neck, or trunk) present. Neurological:      Mental Status: She is alert and oriented to person, place, and time. Psychiatric:         Mood and Affect: Mood normal.         Behavior: Behavior normal.         ED Medications  Medications   predniSONE tablet 40 mg (40 mg Oral Given 8/26/23 1119)       Diagnostic Studies  Results Reviewed     None                 No orders to display         Procedures  Procedures      ED Course         CRAFFT    Flowsheet Row Most Recent Value   CRAFFT Initial Screen: During the past 12 months, did you:    1. Drink any alcohol (more than a few sips)? No Filed at: 08/26/2023 1112   2. Smoke any marijuana or hashish No Filed at: 08/26/2023 1112   3. Use anything else to get high? ("anything else" includes illegal drugs, over the counter and prescription drugs, and things that you sniff or 'figueroa')? No Filed at: 08/26/2023 1112                          SBIRT 22yo+    Flowsheet Row Most Recent Value   Initial Alcohol Screen: US AUDIT-C     1. How often do you have a drink containing alcohol? 0 Filed at: 08/26/2023 1112   2. How many drinks containing alcohol do you have on a typical day you are drinking? 0 Filed at: 08/26/2023 1112   3a. Male UNDER 65: How often do you have five or more drinks on one occasion? 0 Filed at: 08/26/2023 1112   3b. FEMALE Any Age, or MALE 65+: How often do you have 4 or more drinks on one occassion? 0 Filed at: 08/26/2023 1112   Audit-C Score 0 Filed at: 08/26/2023 1112   EVA: How many times in the past year have you. .. Used an illegal drug or used a prescription medication for non-medical reasons?  Never Filed at: 08/26/2023 1112                Medical Decision Reginald Chavarria is a 50 y.o. female who presents to ED for evaluation of itchy rash x1 week while in the bilateral arms, lower legs. No face, neck, trunk involvement. Patient reports she went on a walk in the park 1 week ago around when the symptoms started. Physical exam: Vitals stable. Heart regular rate and rhythm. Lungs clear to auscultation bilaterally. Urticarial rash on the bilateral arms, lower legs, feet. No involvement of the trunk, neck, face. The rash is in several healing stages with several lesions that are crusted over. History and clinical exam most consistent with Poison ivy dermatitis. Work-up includes 1 week of prednisone 40 mg. Will give 1 dose in the ED today and discharge    Will follow up and continue to monitor. Please see ED Course for additional information. Risk  Prescription drug management. Disposition  Final diagnoses:   Poison ivy dermatitis     Time reflects when diagnosis was documented in both MDM as applicable and the Disposition within this note     Time User Action Codes Description Comment    8/26/2023 11:07 AM Felicity Owen Add [L23.7] Poison ivy dermatitis       ED Disposition     ED Disposition   Discharge    Condition   Stable    Date/Time   Sat Aug 26, 2023 11:10 AM    Comment   Bud Chavarria discharge to home/self care. Follow-up Information     Follow up With Specialties Details Why [address] [place] [address] Medicine   [address] [place] [address]  [phone]            Patient's Medications   Discharge Prescriptions    PREDNISONE 20 MG TABLET    Take 2 tablets (40 mg total) by mouth daily for 6 days Do not start before August 27, 2023. Start Date: 8/27/2023 End Date: 9/2/2023       Order Dose: 40 mg       Quantity: 12 tablet    Refills: 0     No discharge procedures on file.     PDMP Review     None           ED Provider  Attending physically available and evaluated Danii Marino. I managed the patient along with the ED Attending.     Electronically Signed by         Arthur Giles DO  08/26/23 112

## 2023-08-26 NOTE — ED ATTENDING ATTESTATION
Chiquita Rodriguez MD, saw and evaluated the patient. I have discussed the patient with the resident and agree with the resident's findings, Plan of Care, and MDM as documented in the resident's note, except where noted. All available labs and Radiology studies were reviewed. I was present for key portions of any procedure(s) performed by the resident and I was immediately available to provide assistance. At this point I agree with the current assessment done in the Emergency Department. I have conducted an independent evaluation of this patient a history and physical is as follows:    49 yo morbidly obese female with a history of bipolar disorder, depression, PTSD, anxiety, and migraine headaches presents to the ED for evaluation of a rash x 1 week. The patient reports an erythematous pruritic papular rash to all four extremities. She says she was exposed to poison ivy during a walk in the park about a week ago. Rash starts on the right wrist but has not spread to both arms, feet, and ankles. No fevers or chills. (+) Scant "clear" weeping from some lesions. Minimal relief with OTC Benadryl and calamine lotion. The patient says she is concerned because the rash is not improving and she had a "severe" reaction to poison oak as a child. No chest pain, shortness of breath, wheezing, or tongue swelling. No other specific complaints. ROS: per resident physician note    Gen: NAD, AA&Ox3  HEENT: PERRL, EOMI, no intraoral swelling  Neck: supple, no stridor  CV: RRR  Lungs: CTA B/L  Abdomen: soft, NT/ND  Ext: no swelling or deformity  Neuro: 5/5 strength all extremities, sensation grossly intact  Skin: (+) mild scattered erythematous maculopapular rash to all four extremities, (+) crusting, no tenderness/warmth/swelling, (+) excoriations    ED Course  The patient is comfortable appearing with stable vital signs. Rash and history are most consistent with a poison ivy dermatitis.  Plan for a short course of oral steroids. The patient was instructed to follow up with her PCP next week for reassessment. Strict return precautions provided.       Critical Care Time  Procedures

## 2023-08-26 NOTE — DISCHARGE INSTRUCTIONS
We are placing you on a steroid medication. This medication takes up to her hours to take full effect. Continue to take the entire course of steroids even if your rash improves. Return to the emergency department if you develop shortness of breath, cough, swelling of the throat, nausea, vomiting.   Follow-up with your primary care doctor

## 2023-08-28 ENCOUNTER — VBI (OUTPATIENT)
Dept: ADMINISTRATIVE | Facility: OTHER | Age: 48
End: 2023-08-28

## 2023-08-28 NOTE — TELEPHONE ENCOUNTER
Heather Walsh    ED Visit Information     Ed visit date: 08/26/23  Diagnosis Description: rash  In Network? Yes [facility]  Discharge status: Home  Discharged with meds ? Yes  Number of ED visits to date: 2  ED Severity:4     Outreach Information    Outreach successful: Yes 1  Date letter mailed:no  Date Finalized:8/28/23    Care Coordination    Follow up appointment with pcp: no sent message to clinical staff  Transportation issues ? No    Value [employer] type: 3 Day Outreach  Emergent necessity warranted by diagnosis: Yes  [facility] PCP: Yes  Transportation: Friend/Family Transport  Ambulance - Was Pt given choice of of ED: Yes  If able to choose an ED.  Would you have chosen [facility]: Yes  Called PCP first?: No  Feels able to call PCP for urgent problems ?: Yes  Understands what emergencies can be handled by PCP ?: Yes  Ever any problems getting appointment with PCP for minor emergency/urgency problems?: No  Reason Patient went to ED instead of Urgent Care or PCP?: Perceived Severity of Illness  Call Complete - Please call patient back to schedule an ER F/U appt - she would like to schedule after she compeltes Meds and her last day will be Friday

## 2023-11-07 ENCOUNTER — APPOINTMENT (OUTPATIENT)
Dept: LAB | Facility: CLINIC | Age: 48
End: 2023-11-07
Payer: MEDICARE

## 2023-11-07 DIAGNOSIS — Z13.220 SCREENING FOR HYPERLIPIDEMIA: ICD-10-CM

## 2023-11-07 DIAGNOSIS — R73.01 IMPAIRED FASTING BLOOD SUGAR: ICD-10-CM

## 2023-11-07 DIAGNOSIS — Z13.1 SCREENING FOR DIABETES MELLITUS (DM): ICD-10-CM

## 2023-11-07 LAB
ANION GAP SERPL CALCULATED.3IONS-SCNC: 8 MMOL/L
BUN SERPL-MCNC: 8 MG/DL (ref 5–25)
CALCIUM SERPL-MCNC: 8.6 MG/DL (ref 8.4–10.2)
CHLORIDE SERPL-SCNC: 104 MMOL/L (ref 96–108)
CHOLEST SERPL-MCNC: 180 MG/DL
CO2 SERPL-SCNC: 25 MMOL/L (ref 21–32)
CREAT SERPL-MCNC: 0.6 MG/DL (ref 0.6–1.3)
EST. AVERAGE GLUCOSE BLD GHB EST-MCNC: 123 MG/DL
GFR SERPL CREATININE-BSD FRML MDRD: 108 ML/MIN/1.73SQ M
GLUCOSE P FAST SERPL-MCNC: 95 MG/DL (ref 65–99)
HBA1C MFR BLD: 5.9 %
HDLC SERPL-MCNC: 49 MG/DL
LDLC SERPL CALC-MCNC: 114 MG/DL (ref 0–100)
NONHDLC SERPL-MCNC: 131 MG/DL
POTASSIUM SERPL-SCNC: 4.1 MMOL/L (ref 3.5–5.3)
SODIUM SERPL-SCNC: 137 MMOL/L (ref 135–147)
TRIGL SERPL-MCNC: 83 MG/DL

## 2023-11-07 PROCEDURE — 36415 COLL VENOUS BLD VENIPUNCTURE: CPT

## 2023-11-07 PROCEDURE — 83036 HEMOGLOBIN GLYCOSYLATED A1C: CPT

## 2023-11-07 PROCEDURE — 80061 LIPID PANEL: CPT

## 2023-11-07 PROCEDURE — 80048 BASIC METABOLIC PNL TOTAL CA: CPT

## 2023-12-13 ENCOUNTER — OFFICE VISIT (OUTPATIENT)
Dept: OBGYN CLINIC | Facility: CLINIC | Age: 48
End: 2023-12-13

## 2023-12-13 VITALS
HEIGHT: 64 IN | WEIGHT: 238 LBS | SYSTOLIC BLOOD PRESSURE: 122 MMHG | DIASTOLIC BLOOD PRESSURE: 80 MMHG | BODY MASS INDEX: 40.63 KG/M2

## 2023-12-13 DIAGNOSIS — Z01.419 ENCOUNTER FOR GYNECOLOGICAL EXAMINATION WITHOUT ABNORMAL FINDING: Primary | ICD-10-CM

## 2023-12-13 DIAGNOSIS — Z12.31 ENCOUNTER FOR SCREENING MAMMOGRAM FOR MALIGNANT NEOPLASM OF BREAST: ICD-10-CM

## 2023-12-13 DIAGNOSIS — Z01.419 PAP SMEAR, AS PART OF ROUTINE GYNECOLOGICAL EXAMINATION: ICD-10-CM

## 2023-12-13 DIAGNOSIS — D06.9 CIN III (CERVICAL INTRAEPITHELIAL NEOPLASIA III): ICD-10-CM

## 2023-12-13 DIAGNOSIS — L90.0 LICHEN SCLEROSUS: ICD-10-CM

## 2023-12-13 PROCEDURE — G0145 SCR C/V CYTO,THINLAYER,RESCR: HCPCS | Performed by: OBSTETRICS & GYNECOLOGY

## 2023-12-13 PROCEDURE — G0476 HPV COMBO ASSAY CA SCREEN: HCPCS | Performed by: OBSTETRICS & GYNECOLOGY

## 2023-12-13 RX ORDER — TRIAMCINOLONE ACETONIDE 0.25 MG/G
CREAM TOPICAL 2 TIMES DAILY
Qty: 30 G | Refills: 2 | Status: SHIPPED | OUTPATIENT
Start: 2023-12-13

## 2023-12-13 NOTE — PROGRESS NOTES
Assessment/Plan:    No problem-specific Assessment & Plan notes found for this encounter. Diagnoses and all orders for this visit:    Encounter for gynecological examination without abnormal finding    Pap smear, as part of routine gynecological examination    LIZETH III (cervical intraepithelial neoplasia III)    Encounter for screening mammogram for malignant neoplasm of breast          Normal gynecological physical examination. Self-breast examination stressed. Mammogram ordered. Discussed regular exercise, healthy diet, importance of vitamin D and calcium supplements. Discussed importance of sun block use during periods of prolonged sun exposure. Patient will be seen in 1 year for routine gynecologic and medical examination. Patient will call office for any problems, concerns, or issues which may arise during the interim. Recommended following up with PCP about appetite loss especially if it persists and she begins to notice rapid weight loss, night sweats, shortness of breath and fevers. Pap smear performed at today's visit, will contact with results. Discussed use of kenalog cream as needed for lichen sclerosus flares rather than clobetasol as less intense steroid for relief. Subjective:          Supriya Hogue is a 50 y.o. 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 She follows up every 6 months with pap smears, the last 2 have been notable only for yeast infection and bacterial vaginosis (December 2022, June 2023) She would like a refill of her clobestasol cream that she uses as needed for flares of her lichen sclerosis, which she states has recently been well controlled. She reports regular periods every 26-28 days that she notes have gotten lighter recently, but denies any skipped periods, intermenstrual bleeding, and heavy cramping.     She reports intermittent hot flashes that she manages with behavioral changes, denies excessive sweating or heat intolerance. Discussed use of evening primrose oil or black kohash if symptoms become more severe and will follow up if needed. She reports decreased appetite over the past month, but denies night sweats, fevers, changes to bowel movements, urinary urgency, urinary frequency, dysuria, hematuria, headaches, vision changes, shortness of breath, chest pain and pelvic pain. Recommended following up with PCP about appetite loss especially if it persists and she begins to notice rapid weight loss, night sweats, shortness of breath and fevers. Pap smear performed at today's visit, will contact with results. Discussed use of kenalog cream as needed for lichen sclerosus flares rather than clobetasol as less intense steroid for relief. Patient ID: Chava Payan is a 50 y.o. female who presents today for her annual gynecologic and medical examination    Menstrual status: Reports regular light periods every 26-28 days, denies skipped periods, intermenstrual bleeding, heavy bleeding and excessive cramping. Vasomotor symptoms:  Reports intermittent hot flashes, denies heat intolerance and excessive sweating    Patient reports normal appetite    Patient reports normal bowel and bladder habits    Patient denies any significant pelvic or abdominal pain    Patient denies any headaches, chest pain, shortness of breath fever shakes or chills    Patient denies any COVID 19 symptoms including cough or loss of taste or smell    COVID vaccine status:  Received covid  vaccine x4, aware of current guidelines and recommendations    Medical problems: Followed for cholesterol and prediabetes    Colonoscopy status: Up to date on colonoscopy (2022, return in 5 years- [facility] colon cancer)    Mammogram status: Up to date on mammogram, scheduled for next.  Order placed at today's visit for next mammogram. Discussed importance of regular self breast exam.     The following portions of the patient's history were reviewed and updated as appropriate: allergies, current medications, past family history, past medical history, past social history, past surgical history and problem list.    Review of Systems   Constitutional:  Positive for appetite change (reports decreased appetite over past month- recommended follow up with PCP). Negative for diaphoresis, fatigue, fever and unexpected weight change. HENT: Negative. Eyes: Negative. Respiratory: Negative. Cardiovascular: Negative. Gastrointestinal: Negative. Negative for abdominal pain, blood in stool, constipation, diarrhea, nausea and vomiting. Endocrine: Negative. Negative for cold intolerance and heat intolerance. Genitourinary: Negative. Negative for dysuria, frequency, hematuria, urgency, vaginal bleeding, vaginal discharge and vaginal pain. Musculoskeletal: Negative. Skin: Negative. Allergic/Immunologic: Negative. Neurological: Negative. Hematological: Negative. Negative for adenopathy. Psychiatric/Behavioral: Negative. Objective:      /80   Ht 5' 4" (1.626 m)   Wt 108 kg (238 lb)   LMP 11/20/2023 (Exact Date)   BMI 40.85 kg/m²          Physical Exam  Constitutional:       General: She is not in acute distress. Appearance: Normal appearance. She is well-developed. She is not diaphoretic. HENT:      Head: Normocephalic and atraumatic. Eyes:      Pupils: Pupils are equal, round, and reactive to light. Cardiovascular:      Rate and Rhythm: Normal rate and regular rhythm. Heart sounds: Normal heart sounds. No murmur heard. No friction rub. No gallop. Pulmonary:      Effort: Pulmonary effort is normal.      Breath sounds: Normal breath sounds. Chest:   Breasts:     Breasts are symmetrical.      Right: No inverted nipple, mass, nipple discharge, skin change or tenderness.       Left: No inverted nipple, mass, nipple discharge, skin change or tenderness. Abdominal:      General: Bowel sounds are normal.      Palpations: Abdomen is soft. Genitourinary:     General: Normal vulva. Exam position: Supine. Labia:         Right: No rash or lesion. Left: No rash or lesion. Vagina: Normal. No vaginal discharge, erythema, tenderness or bleeding. Cervix: No discharge or friability. Uterus: Not enlarged and not tender. Adnexa:         Right: No mass, tenderness or fullness. Left: No mass, tenderness or fullness. Rectum: Normal. Guaiac result negative. Comments: Stable changes with lichen sclerosus, mild atrophic vaginitis, good pelvic support on exam  Musculoskeletal:         General: Normal range of motion. Cervical back: Normal range of motion and neck supple. Lymphadenopathy:      Cervical: No cervical adenopathy. Upper Body:      Right upper body: No supraclavicular adenopathy. Left upper body: No supraclavicular adenopathy. Skin:     General: Skin is warm and dry. Findings: No rash. Neurological:      General: No focal deficit present. Mental Status: She is alert and oriented to person, place, and time. Psychiatric:         Mood and Affect: Mood normal.         Speech: Speech normal.         Behavior: Behavior normal.         Thought Content:  Thought content normal.         Judgment: Judgment normal.

## 2023-12-13 NOTE — PATIENT INSTRUCTIONS
Normal gynecological physical examination. Self-breast examination stressed. Mammogram ordered. Discussed regular exercise, healthy diet, importance of vitamin D and calcium supplements. Discussed importance of sun block use during periods of prolonged sun exposure. Patient will be seen in 1 year for routine gynecologic and medical examination. Patient will call office for any problems, concerns, or issues which may arise during the interim. Order placed at today's visit for kenalog cream- discussed use as needed for lichen sclerosus rather than clobetasol     Recommended following up with PCP about appetite loss especially if it persists and she begins to notice rapid weight loss, night sweats, shortness of breath and fevers. Pap smear performed at today's visit, will contact with results.

## 2023-12-20 LAB
LAB AP GYN PRIMARY INTERPRETATION: NORMAL
Lab: NORMAL

## 2024-01-31 ENCOUNTER — HOSPITAL ENCOUNTER (OUTPATIENT)
Dept: RADIOLOGY | Age: 49
Discharge: HOME | End: 2024-01-31
Payer: MEDICARE

## 2024-01-31 VITALS — BODY MASS INDEX: 40.63 KG/M2 | WEIGHT: 238 LBS | HEIGHT: 64 IN

## 2024-01-31 DIAGNOSIS — Z12.31 VISIT FOR SCREENING MAMMOGRAM: ICD-10-CM

## 2024-01-31 PROCEDURE — 77063 BREAST TOMOSYNTHESIS BI: CPT

## 2024-01-31 PROCEDURE — 77067 SCR MAMMO BI INCL CAD: CPT

## 2024-02-07 DIAGNOSIS — Z00.6 ENCOUNTER FOR EXAMINATION FOR NORMAL COMPARISON OR CONTROL IN CLINICAL RESEARCH PROGRAM: ICD-10-CM

## 2024-02-21 ENCOUNTER — OFFICE VISIT (OUTPATIENT)
Dept: FAMILY MEDICINE CLINIC | Facility: CLINIC | Age: 49
End: 2024-02-21
Payer: MEDICARE

## 2024-02-21 VITALS
SYSTOLIC BLOOD PRESSURE: 120 MMHG | OXYGEN SATURATION: 99 % | TEMPERATURE: 97.6 F | HEART RATE: 75 BPM | BODY MASS INDEX: 40.08 KG/M2 | HEIGHT: 64 IN | WEIGHT: 234.8 LBS | RESPIRATION RATE: 16 BRPM | DIASTOLIC BLOOD PRESSURE: 86 MMHG

## 2024-02-21 DIAGNOSIS — D06.9 CIN III (CERVICAL INTRAEPITHELIAL NEOPLASIA III): ICD-10-CM

## 2024-02-21 DIAGNOSIS — E66.01 CLASS 3 SEVERE OBESITY DUE TO EXCESS CALORIES WITHOUT SERIOUS COMORBIDITY WITH BODY MASS INDEX (BMI) OF 40.0 TO 44.9 IN ADULT (HCC): ICD-10-CM

## 2024-02-21 DIAGNOSIS — Z11.4 SCREENING FOR HIV WITHOUT PRESENCE OF RISK FACTORS: ICD-10-CM

## 2024-02-21 DIAGNOSIS — Z00.00 ENCOUNTER FOR ANNUAL WELLNESS EXAM IN MEDICARE PATIENT: ICD-10-CM

## 2024-02-21 DIAGNOSIS — R73.01 IFG (IMPAIRED FASTING GLUCOSE): ICD-10-CM

## 2024-02-21 DIAGNOSIS — E78.00 ELEVATED LDL CHOLESTEROL LEVEL: Chronic | ICD-10-CM

## 2024-02-21 DIAGNOSIS — F31.77 BIPOLAR DISORDER, IN PARTIAL REMISSION, MOST RECENT EPISODE MIXED (HCC): Primary | ICD-10-CM

## 2024-02-21 DIAGNOSIS — Z11.59 ENCOUNTER FOR HEPATITIS C SCREENING TEST FOR LOW RISK PATIENT: ICD-10-CM

## 2024-02-21 DIAGNOSIS — D50.9 IRON DEFICIENCY ANEMIA, UNSPECIFIED IRON DEFICIENCY ANEMIA TYPE: ICD-10-CM

## 2024-02-21 PROCEDURE — G0439 PPPS, SUBSEQ VISIT: HCPCS | Performed by: NURSE PRACTITIONER

## 2024-02-21 PROCEDURE — 99214 OFFICE O/P EST MOD 30 MIN: CPT | Performed by: NURSE PRACTITIONER

## 2024-02-21 NOTE — PROGRESS NOTES
Assessment and Plan:     Problem List Items Addressed This Visit          Endocrine    IFG (impaired fasting glucose)     Updated A1C and FBS ordered today  Follow a low carb/ low sugar diet  Work on regular exercise          Relevant Orders    Hemoglobin A1C       Genitourinary    LIZETH III (cervical intraepithelial neoplasia III)     Stable and managed by Gynecology, sees GYN every 6 months and is up to date with this   She is s/p cold knife cone and vulvar bx 2/2022 (margins negative)            Other    Elevated LDL cholesterol level (Chronic)     Updated lipid panel ordered  Current ASCVD risk at 1%         Relevant Orders    Comprehensive metabolic panel    Hemoglobin A1C    TSH, 3rd generation with Free T4 reflex    Lipid panel    Bipolar disorder (HCC) - Primary     Pt will schedule CBT with our LCSW in the office  She declined any medications at this time         Class 3 severe obesity due to excess calories without serious comorbidity with body mass index (BMI) of 40.0 to 44.9 in adult (HCC)     Encouraged lifestyle modifications  No interest in seeing Weight Management          Relevant Orders    Comprehensive metabolic panel    TSH, 3rd generation with Free T4 reflex    Anemia     Iron panel and CBC ordered  No current iron supplementation          Relevant Orders    CBC and differential    Comprehensive metabolic panel    TSH, 3rd generation with Free T4 reflex    Iron Panel (Includes Ferritin, Iron Sat%, Iron, and TIBC)     Other Visit Diagnoses       Encounter for annual wellness exam in Medicare patient        Encounter for hepatitis C screening test for low risk patient        Relevant Orders    Hepatitis C Ab W/Refl To HCV RNA, Qn, PCR    Screening for HIV without presence of risk factors        Relevant Orders    HIV 1/2 AG/AB w Reflex [facility] for 2 yr old and above             Preventive health issues were discussed with patient, and age appropriate screening tests were ordered as noted in patient's  After Visit Summary.  Personalized health advice and appropriate referrals for health education or preventive services given if needed, as noted in patient's After Visit Summary.     History of Present Illness:     Patient presents for a Medicare Wellness Visit    HPI     Pt presents by herself today for a routine follow up and AWV.  She is ne to myself, previously seen by Dr. James in our office    Due for lab work now  Colonoscopy last 11/2021, due for repeat 2025    Pt follows with Gynecology for LIZETH III, sees every 6 months    Pt notes a diagnosis of bipolar disorder- notes she has seen a therapist in the past and is currently on a waiting list to begin therapy again.  Feeling somewhat more anxious and would like to be more proactive.  No current medications and she denies wanting to start anything now.  She does note a good support system      Patient Care Team:  RIAZ Yi as PCP - General (Family Medicine)     Review of Systems:     Review of Systems   Constitutional:  Negative for activity change, appetite change, chills, diaphoresis, fatigue, fever and unexpected weight change.   Eyes:  Negative for visual disturbance.   Respiratory:  Negative for cough, chest tightness, shortness of breath and wheezing.    Cardiovascular:  Negative for chest pain, palpitations and leg swelling.   Gastrointestinal:  Negative for abdominal pain, blood in stool, constipation, diarrhea and nausea.   Genitourinary:  Negative for dysuria.   Musculoskeletal:  Negative for arthralgias and myalgias.   Skin:  Negative for rash and wound.   Neurological:  Negative for dizziness, weakness, numbness and headaches.   Psychiatric/Behavioral:  Negative for dysphoric mood, hallucinations, self-injury, sleep disturbance and suicidal ideas. The patient is nervous/anxious. The patient is not hyperactive.         Problem List:     Patient Active Problem List   Diagnosis    Bipolar disorder (HCC)    Lichen sclerosus    Family  history of colon cancer    Immunization deficiency    Elevated LDL cholesterol level    Class 3 severe obesity due to excess calories without serious comorbidity with body mass index (BMI) of 40.0 to 44.9 in adult (HCC)    LIZETH III (cervical intraepithelial neoplasia III)    Depression    Anemia    PTSD (post-traumatic stress disorder)    IFG (impaired fasting glucose)      Past Medical and Surgical History:     Past Medical History:   Diagnosis Date    Anemia     Last assessed 2014    Anxiety     Bipolar 1 disorder (HCC)     Class 2 obesity due to excess calories without serious comorbidity with body mass index (BMI) of 37.0 to 37.9 in adult 2019    Depression     Diastasis recti     Last assessed 2017    Migraine     Last assessed 2014    Obesity (BMI 35.0-39.9 without comorbidity) 2018    PTSD (post-traumatic stress disorder)      Past Surgical History:   Procedure Laterality Date    BREAST EXCISIONAL BIOPSY Left     age 16    BREAST SURGERY      Lumpectomy    CERVICAL BIOPSY N/A 2022    Procedure: COLD KNIFE CONE AND VULVAR BIOPSIES;  Surgeon: Vera Kirkpatrick MD;  Location: [facility];  Service: Gynecology Oncology     SECTION      LAPAROSCOPY FOR ECTOPIC PREGNANCY      TUBAL LIGATION        Family History:     Family History   Problem Relation Age of Onset    Diabetes Mother     Hyperlipidemia Mother     Hypertension Mother     Ovarian cancer Mother 68    Alcohol abuse Father     Substance Abuse Father     Mental illness Father     Colon cancer Sister 45    Leukemia Maternal Grandmother     Breast cancer Maternal Aunt         unknown age    No Known Problems Maternal Grandfather     No Known Problems Paternal Grandmother     No Known Problems Paternal Grandfather     No Known Problems Son     No Known Problems Son     No Known Problems Son     No Known Problems Son     No Known Problems Sister     Breast cancer Maternal Aunt         unknown age    Breast cancer Maternal Aunt          unknown age    Breast cancer Maternal Aunt         unknown age    Uterine cancer Maternal Aunt         unknonw age    No Known Problems Paternal Aunt     No Known Problems Paternal Aunt     No Known Problems Paternal Aunt     Testicular cancer Cousin 28    Cancer Maternal Aunt       Social History:     Social History     Socioeconomic History    Marital status: Single     Spouse name: None    Number of children: 4    Years of education: None    Highest education level: None   Occupational History    None   Tobacco Use    Smoking status: Never    Smokeless tobacco: Never   Vaping Use    Vaping status: Never Used   Substance and Sexual Activity    Alcohol use: Yes     Comment: Rarely    Drug use: Never    Sexual activity: Yes     Partners: Male     Birth control/protection: Surgical   Other Topics Concern    None   Social History Narrative    Child of  household    Disability-Mental Health     Social Determinants of Health     Financial Resource Strain: High Risk (2/19/2024)    Overall Financial Resource Strain (CARDIA)     Difficulty of Paying Living Expenses: Hard   Food Insecurity: No Food Insecurity (9/21/2021)    Hunger Vital Sign     Worried About Running Out of Food in the Last Year: Never true     Ran Out of Food in the Last Year: Never true   Transportation Needs: No Transportation Needs (2/19/2024)    PRAPARE - Transportation     Lack of Transportation (Medical): No     Lack of Transportation (Non-Medical): No   Physical Activity: Inactive (9/21/2021)    Exercise Vital Sign     Days of Exercise per Week: 0 days     Minutes of Exercise per Session: 0 min   Stress: No Stress Concern Present (9/21/2021)    [employer] - Occupational Stress Questionnaire     Feeling of Stress : Not at all   Social Connections: Moderately Isolated (9/21/2021)    Social Connection and Isolation Panel [NHANES]     Frequency of Communication with Friends and Family: More than three  times a week     Frequency of Social Gatherings with Friends and Family: Once a week     Attends Jew Services: More than 4 times per year     Active Member of Clubs or Organizations: No     Attends Club or Organization Meetings: Never     Marital Status: Never    Intimate Partner Violence: Not At Risk (9/21/2021)    Humiliation, Afraid, Rape, and Kick questionnaire     Fear of Current or Ex-Partner: No     Emotionally Abused: No     Physically Abused: No     Sexually Abused: No   Housing Stability: Low Risk  (9/21/2021)    Housing Stability Vital Sign     Unable to Pay for Housing in the Last Year: No     Number of Places Lived in the Last Year: 1     Unstable Housing in the Last Year: No      Medications and Allergies:     Current Outpatient Medications   Medication Sig Dispense Refill    triamcinolone (KENALOG) 0.025 % cream Apply topically 2 (two) times a day 30 g 2     No current facility-administered medications for this visit.     Allergies   Allergen Reactions    Flagyl [Metronidazole] Hives    Pollen Extract Sneezing      Immunizations:     Immunization History   Administered Date(s) Administered    COVID-19 PFIZER VACCINE 0.3 ML IM 04/20/2021, 05/02/2021, 02/10/2022    Hep B, adult 08/21/2019, 09/21/2021    INFLUENZA 12/18/2018    Influenza, injectable, quadrivalent, preservative free 0.5 mL 09/21/2021    Influenza, recombinant, quadrivalent,injectable, preservative free 11/23/2022    MMR 08/21/2019    Tdap 01/19/2017    Tuberculin Skin Test-PPD Intradermal 08/19/2019, 09/21/2021      Health Maintenance:         Topic Date Due    Hepatitis C Screening  Never done    HIV Screening  Never done    Breast Cancer Screening: Mammogram  01/31/2025    Colorectal Cancer Screening  01/26/2027    Cervical Cancer Screening  12/13/2028         Topic Date Due    Influenza Vaccine (1) 09/01/2023    COVID-19 Vaccine (4 - 2023-24 season) 09/01/2023      Medicare Screening Tests and Risk Assessments:          Health Risk Assessment:   Patient rates overall health as good. Patient feels that their physical health rating is same. Patient is satisfied with their life. Eyesight was rated as same. Hearing was rated as same. Patient feels that their emotional and mental health rating is same. Patients states they are never, rarely angry. Patient states they are often unusually tired/fatigued. Pain experienced in the last 7 days has been none. Patient states that she has experienced no weight loss or gain in last 6 months.     Fall Risk Screening:   In the past year, patient has experienced: no history of falling in past year      Urinary Incontinence Screening:   Patient has leaked urine accidently in the last six months.     Home Safety:  Patient does not have trouble with stairs inside or outside of their home. Patient has working smoke alarms and has working carbon monoxide detector. Home safety hazards include: none.     Nutrition:   Current diet is Regular.     Medications:   Patient is not currently taking any over-the-counter supplements. Patient is able to manage medications.     Activities of Daily Living (ADLs)/Instrumental Activities of Daily Living (IADLs):   Walk and transfer into and out of bed and chair?: Yes  Dress and groom yourself?: Yes    Bathe or shower yourself?: Yes    Feed yourself? Yes  Do your laundry/housekeeping?: Yes  Manage your money, pay your bills and track your expenses?: Yes  Make your own meals?: Yes    Do your own shopping?: Yes    Previous Hospitalizations:   Any hospitalizations or ED visits within the last 12 months?: No      Advance Care Planning:   Living will: No    Durable POA for healthcare: No    Advanced directive: No    ACP document given: Yes      Cognitive Screening:   Provider or family/friend/caregiver concerned regarding cognition?: No    PREVENTIVE SCREENINGS      Cardiovascular Screening:    General: Screening Current      Diabetes Screening:     General: Screening  "Current      Colorectal Cancer Screening:     General: Screening Current      Breast Cancer Screening:     General: Screening Current      Cervical Cancer Screening:    General: History Cervical Cancer      Osteoporosis Screening:    General: Screening Not Indicated      Abdominal Aortic Aneurysm (AAA) Screening:        General: Screening Not Indicated      Lung Cancer Screening:     General: Screening Not Indicated      Hepatitis C Screening:    General: Risks and Benefits Discussed    Hep C Screening Accepted: Yes      Screening, Brief Intervention, and Referral to Treatment (SBIRT)    Screening  Typical number of drinks in a day: 0  Typical number of drinks in a week: 0  Interpretation: Low risk drinking behavior.    AUDIT-C Screenin) How often did you have a drink containing alcohol in the past year? monthly or less  2) How many drinks did you have on a typical day when you were drinking in the past year? 1 to 2  3) How often did you have 6 or more drinks on one occasion in the past year? never    AUDIT-C Score: 1  Interpretation: Score 0-2 (female): Negative screen for alcohol misuse    Single Item Drug Screening:  How often have you used an illegal drug (including marijuana) or a prescription medication for non-medical reasons in the past year? never    Single Item Drug Screen Score: 0  Interpretation: Negative screen for possible drug use disorder    No results found.     Physical Exam:     /86 (BP Location: Left arm, Patient Position: Sitting, Cuff Size: Large)   Pulse 75   Temp 97.6 °F (36.4 °C) (Tympanic)   Resp 16   Ht 5' 4\" (1.626 m)   Wt 107 kg (234 lb 12.8 oz)   LMP 2024 (Approximate)   SpO2 99%   BMI 40.30 kg/m²     Physical Exam  Constitutional:       General: She is not in acute distress.     Appearance: Normal appearance. She is obese. She is not ill-appearing, toxic-appearing or diaphoretic.   HENT:      Head: Normocephalic and atraumatic.      Right Ear: Tympanic membrane " normal.      Left Ear: Tympanic membrane normal.      Nose: Nose normal.      Mouth/Throat:      Mouth: Mucous membranes are moist.      Pharynx: Oropharynx is clear.   Eyes:      Extraocular Movements: Extraocular movements intact.      Conjunctiva/sclera: Conjunctivae normal.      Pupils: Pupils are equal, round, and reactive to light.   Neck:      Vascular: No carotid bruit.   Cardiovascular:      Rate and Rhythm: Normal rate and regular rhythm.      Pulses: Normal pulses.      Heart sounds: Normal heart sounds. No murmur heard.  Pulmonary:      Effort: Pulmonary effort is normal. No respiratory distress.      Breath sounds: Normal breath sounds. No wheezing.   Abdominal:      General: Bowel sounds are normal. There is no distension.      Palpations: Abdomen is soft.      Tenderness: There is no abdominal tenderness.   Musculoskeletal:         General: Normal range of motion.      Cervical back: Normal range of motion and neck supple. No rigidity. No muscular tenderness.   Lymphadenopathy:      Cervical: No cervical adenopathy.   Skin:     General: Skin is warm and dry.      Capillary Refill: Capillary refill takes less than 2 seconds.      Findings: No bruising, erythema or rash.   Neurological:      General: No focal deficit present.      Mental Status: She is alert and oriented to person, place, and time.   Psychiatric:         Mood and Affect: Mood normal.         Behavior: Behavior normal.         Thought Content: Thought content normal.         Judgment: Judgment normal.          RIAZ Yi

## 2024-02-21 NOTE — PATIENT INSTRUCTIONS
Medicare Preventive Visit Patient Instructions  Thank you for completing your Welcome to Medicare Visit or Medicare Annual Wellness Visit today. Your next wellness visit will be due in one year (2/21/2025).  The screening/preventive services that you may require over the next 5-10 years are detailed below. Some tests may not apply to you based off risk factors and/or age. Screening tests ordered at today's visit but not completed yet may show as past due. Also, please note that scanned in results may not display below.  Preventive Screenings:  Service Recommendations Previous Testing/Comments   Colorectal Cancer Screening  * Colonoscopy    * Fecal Occult Blood Test (FOBT)/Fecal Immunochemical Test (FIT)  * Fecal DNA/Cologuard Test  * Flexible Sigmoidoscopy Age: 45-75 years old   Colonoscopy: every 10 years (may be performed more frequently if at higher risk)  OR  FOBT/FIT: every 1 year  OR  Cologuard: every 3 years  OR  Sigmoidoscopy: every 5 years  Screening may be recommended earlier than age 45 if at higher risk for colorectal cancer. Also, an individualized decision between you and your healthcare provider will decide whether screening between the ages of 76-85 would be appropriate. Colonoscopy: 01/27/2022  FOBT/FIT: Not on file  Cologuard: Not on file  Sigmoidoscopy: Not on file    Screening Current     Breast Cancer Screening Age: 40+ years old  Frequency: every 1-2 years  Not required if history of left and right mastectomy Mammogram: 01/31/2024    Screening Current   Cervical Cancer Screening Between the ages of 21-29, pap smear recommended once every 3 years.   Between the ages of 30-65, can perform pap smear with HPV co-testing every 5 years.   Recommendations may differ for women with a history of total hysterectomy, cervical cancer, or abnormal pap smears in past. Pap Smear: 12/13/2023    History Cervical Cancer   Hepatitis C Screening Once for adults born between 1945 and 1965  More frequently in  patients at high risk for Hepatitis C Hep C Antibody: Not on file        Diabetes Screening 1-2 times per year if you're at risk for diabetes or have pre-diabetes Fasting glucose: 95 mg/dL (11/7/2023)  A1C: 5.9 % (11/7/2023)  Screening Current   Cholesterol Screening Once every 5 years if you don't have a lipid disorder. May order more often based on risk factors. Lipid panel: 11/07/2023    Screening Current     Other Preventive Screenings Covered by Medicare:  Abdominal Aortic Aneurysm (AAA) Screening: covered once if your at risk. You're considered to be at risk if you have a family history of AAA.  Lung Cancer Screening: covers low dose CT scan once per year if you meet all of the following conditions: (1) Age 55-77; (2) No signs or symptoms of lung cancer; (3) Current smoker or have quit smoking within the last 15 years; (4) You have a tobacco smoking history of at least 20 pack years (packs per day multiplied by number of years you smoked); (5) You get a written order from a healthcare provider.  Glaucoma Screening: covered annually if you're considered high risk: (1) You have diabetes OR (2) Family history of glaucoma OR (3)  aged 50 and older OR (4)  American aged 65 and older  Osteoporosis Screening: covered every 2 years if you meet one of the following conditions: (1) You're estrogen deficient and at risk for osteoporosis based off medical history and other findings; (2) Have a vertebral abnormality; (3) On glucocorticoid therapy for more than 3 months; (4) Have primary hyperparathyroidism; (5) On osteoporosis medications and need to assess response to drug therapy.   Last bone density test (DXA Scan): Not on file.  HIV Screening: covered annually if you're between the age of 15-65. Also covered annually if you are younger than 15 and older than 65 with risk factors for HIV infection. For pregnant patients, it is covered up to 3 times per pregnancy.    Immunizations:  Immunization  Recommendations   Influenza Vaccine Annual influenza vaccination during flu season is recommended for all persons aged >= 6 months who do not have contraindications   Pneumococcal Vaccine   * Pneumococcal conjugate vaccine = PCV13 (Prevnar 13), PCV15 (Vaxneuvance), PCV20 (Prevnar 20)  * Pneumococcal polysaccharide vaccine = PPSV23 (Pneumovax) Adults 19-63 yo with certain risk factors or if 65+ yo  If never received any pneumonia vaccine: recommend Prevnar 20 (PCV20)  Give PCV20 if previously received 1 dose of PCV13 or PPSV23   Hepatitis B Vaccine 3 dose series if at intermediate or high risk (ex: diabetes, end stage renal disease, liver disease)   Respiratory syncytial virus (RSV) Vaccine - COVERED BY MEDICARE PART D  * RSVPreF3 (Arexvy) CDC recommends that adults 60 years of age and older may receive a single dose of RSV vaccine using shared clinical decision-making (SCDM)   Tetanus (Td) Vaccine - COST NOT COVERED BY MEDICARE PART B Following completion of primary series, a booster dose should be given every 10 years to maintain immunity against tetanus. Td may also be given as tetanus wound prophylaxis.   Tdap Vaccine - COST NOT COVERED BY MEDICARE PART B Recommended at least once for all adults. For pregnant patients, recommended with each pregnancy.   Shingles Vaccine (Shingrix) - COST NOT COVERED BY MEDICARE PART B  2 shot series recommended in those 19 years and older who have or will have weakened immune systems or those 50 years and older     Health Maintenance Due:      Topic Date Due   • Hepatitis C Screening  Never done   • HIV Screening  Never done   • Breast Cancer Screening: Mammogram  01/31/2025   • Colorectal Cancer Screening  01/26/2027   • Cervical Cancer Screening  12/13/2028     Immunizations Due:      Topic Date Due   • Influenza Vaccine (1) 09/01/2023   • COVID-19 Vaccine (4 - 2023-24 season) 09/01/2023     Advance Directives   What are advance directives?  Advance directives are legal  documents that state your wishes and plans for medical care. These plans are made ahead of time in case you lose your ability to make decisions for yourself. Advance directives can apply to any medical decision, such as the treatments you want, and if you want to donate organs.   What are the types of advance directives?  There are many types of advance directives, and each state has rules about how to use them. You may choose a combination of any of the following:  Living will:  This is a written record of the treatment you want. You can also choose which treatments you do not want, which to limit, and which to stop at a certain time. This includes surgery, medicine, IV fluid, and tube feedings.   Durable power of  for healthcare (DPAHC):  This is a written record that states who you want to make healthcare choices for you when you are unable to make them for yourself. This person, called a proxy, is usually a family member or a friend. You may choose more than 1 proxy.  Do not resuscitate (DNR) order:  A DNR order is used in case your heart stops beating or you stop breathing. It is a request not to have certain forms of treatment, such as CPR. A DNR order may be included in other types of advance directives.  Medical directive:  This covers the care that you want if you are in a coma, near death, or unable to make decisions for yourself. You can list the treatments you want for each condition. Treatment may include pain medicine, surgery, blood transfusions, dialysis, IV or tube feedings, and a ventilator (breathing machine).  Values history:  This document has questions about your views, beliefs, and how you feel and think about life. This information can help others choose the care that you would choose.  Why are advance directives important?  An advance directive helps you control your care. Although spoken wishes may be used, it is better to have your wishes written down. Spoken wishes can be  misunderstood, or not followed. Treatments may be given even if you do not want them. An advance directive may make it easier for your family to make difficult choices about your care.   Urinary Incontinence   Urinary incontinence (UI)  is when you lose control of your bladder. UI develops because your bladder cannot store or empty urine properly. The 3 most common types of UI are stress incontinence, urge incontinence, or both.  Medicines:   May be given to help strengthen your bladder control. Report any side effects of medication to your healthcare provider.  Do pelvic muscle exercises often:  Your pelvic muscles help you stop urinating. Squeeze these muscles tight for 5 seconds, then relax for 5 seconds. Gradually work up to squeezing for 10 seconds. Do 3 sets of 15 repetitions a day, or as directed. This will help strengthen your pelvic muscles and improve bladder control.  Train your bladder:  Go to the bathroom at set times, such as every 2 hours, even if you do not feel the urge to go. You can also try to hold your urine when you feel the urge to go. For example, hold your urine for 5 minutes when you feel the urge to go. As that becomes easier, hold your urine for 10 minutes.   Self-care:   Keep a UI record.  Write down how often you leak urine and how much you leak. Make a note of what you were doing when you leaked urine.  Drink liquids as directed. You may need to limit the amount of liquid you drink to help control your urine leakage. Do not drink any liquid right before you go to bed. Limit or do not have drinks that contain caffeine or alcohol.   Prevent constipation.  Eat a variety of high-fiber foods. Good examples are high-fiber cereals, beans, vegetables, and whole-grain breads. Walking is the best way to trigger your intestines to have a bowel movement.  Exercise regularly and maintain a healthy weight.  Weight loss and exercise will decrease pressure on your bladder and help you control your  leakage.   Use a catheter as directed  to help empty your bladder. A catheter is a tiny, plastic tube that is put into your bladder to drain your urine.   Go to behavior therapy as directed.  Behavior therapy may be used to help you learn to control your urge to urinate.    Weight Management   Why it is important to manage your weight:  Being overweight increases your risk of health conditions such as heart disease, high blood pressure, type 2 diabetes, and certain types of cancer. It can also increase your risk for osteoarthritis, sleep apnea, and other respiratory problems. Aim for a slow, steady weight loss. Even a small amount of weight loss can lower your risk of health problems.  How to lose weight safely:  A safe and healthy way to lose weight is to eat fewer calories and get regular exercise. You can lose up about 1 pound a week by decreasing the number of calories you eat by 500 calories each day.   Healthy meal plan for weight management:  A healthy meal plan includes a variety of foods, contains fewer calories, and helps you stay healthy. A healthy meal plan includes the following:  Eat whole-grain foods more often.  A healthy meal plan should contain fiber. Fiber is the part of grains, fruits, and vegetables that is not broken down by your body. Whole-grain foods are healthy and provide extra fiber in your diet. Some examples of whole-grain foods are whole-wheat breads and pastas, oatmeal, brown rice, and bulgur.  Eat a variety of vegetables every day.  Include dark, leafy greens such as spinach, kale, brina greens, and mustard greens. Eat yellow and orange vegetables such as carrots, sweet potatoes, and winter squash.   Eat a variety of fruits every day.  Choose fresh or canned fruit (canned in its own juice or light syrup) instead of juice. Fruit juice has very little or no fiber.  Eat low-fat dairy foods.  Drink fat-free (skim) milk or 1% milk. Eat fat-free yogurt and low-fat cottage cheese. Try  low-fat cheeses such as mozzarella and other reduced-fat cheeses.  Choose meat and other protein foods that are low in fat.  Choose beans or other legumes such as split peas or lentils. Choose fish, skinless poultry (chicken or turkey), or lean cuts of red meat (beef or pork). Before you cook meat or poultry, cut off any visible fat.   Use less fat and oil.  Try baking foods instead of frying them. Add less fat, such as margarine, sour cream, regular salad dressing and mayonnaise to foods. Eat fewer high-fat foods. Some examples of high-fat foods include french fries, doughnuts, ice cream, and cakes.  Eat fewer sweets.  Limit foods and drinks that are high in sugar. This includes candy, cookies, regular soda, and sweetened drinks.  Exercise:  Exercise at least 30 minutes per day on most days of the week. Some examples of exercise include walking, biking, dancing, and swimming. You can also fit in more physical activity by taking the stairs instead of the elevator or parking farther away from stores. Ask your healthcare provider about the best exercise plan for you.      © Copyright [employer] 2018 Information is for End User's use only and may not be sold, redistributed or otherwise used for commercial purposes. All illustrations and images included in CareNotes® are the copyrighted property of [employer] or [employer]

## 2024-02-23 ENCOUNTER — APPOINTMENT (OUTPATIENT)
Dept: LAB | Facility: CLINIC | Age: 49
End: 2024-02-23
Payer: MEDICARE

## 2024-02-23 DIAGNOSIS — R73.01 IFG (IMPAIRED FASTING GLUCOSE): ICD-10-CM

## 2024-02-23 DIAGNOSIS — E78.00 ELEVATED LDL CHOLESTEROL LEVEL: Chronic | ICD-10-CM

## 2024-02-23 DIAGNOSIS — D50.9 IRON DEFICIENCY ANEMIA, UNSPECIFIED IRON DEFICIENCY ANEMIA TYPE: ICD-10-CM

## 2024-02-23 DIAGNOSIS — Z11.59 ENCOUNTER FOR HEPATITIS C SCREENING TEST FOR LOW RISK PATIENT: ICD-10-CM

## 2024-02-23 DIAGNOSIS — Z00.6 ENCOUNTER FOR EXAMINATION FOR NORMAL COMPARISON OR CONTROL IN CLINICAL RESEARCH PROGRAM: ICD-10-CM

## 2024-02-23 DIAGNOSIS — Z11.4 SCREENING FOR HIV WITHOUT PRESENCE OF RISK FACTORS: ICD-10-CM

## 2024-02-23 DIAGNOSIS — E66.01 CLASS 3 SEVERE OBESITY DUE TO EXCESS CALORIES WITHOUT SERIOUS COMORBIDITY WITH BODY MASS INDEX (BMI) OF 40.0 TO 44.9 IN ADULT (HCC): ICD-10-CM

## 2024-02-23 LAB
ALBUMIN SERPL BCP-MCNC: 4 G/DL (ref 3.5–5)
ALP SERPL-CCNC: 58 U/L (ref 34–104)
ALT SERPL W P-5'-P-CCNC: 11 U/L (ref 7–52)
ANION GAP SERPL CALCULATED.3IONS-SCNC: 8 MMOL/L
AST SERPL W P-5'-P-CCNC: 13 U/L (ref 13–39)
BASOPHILS # BLD AUTO: 0.03 THOUSANDS/ÂΜL (ref 0–0.1)
BASOPHILS NFR BLD AUTO: 1 % (ref 0–1)
BILIRUB SERPL-MCNC: 0.45 MG/DL (ref 0.2–1)
BUN SERPL-MCNC: 9 MG/DL (ref 5–25)
CALCIUM SERPL-MCNC: 9.1 MG/DL (ref 8.4–10.2)
CHLORIDE SERPL-SCNC: 103 MMOL/L (ref 96–108)
CHOLEST SERPL-MCNC: 180 MG/DL
CO2 SERPL-SCNC: 27 MMOL/L (ref 21–32)
CREAT SERPL-MCNC: 0.63 MG/DL (ref 0.6–1.3)
EOSINOPHIL # BLD AUTO: 0.13 THOUSAND/ÂΜL (ref 0–0.61)
EOSINOPHIL NFR BLD AUTO: 2 % (ref 0–6)
ERYTHROCYTE [DISTWIDTH] IN BLOOD BY AUTOMATED COUNT: 16.8 % (ref 11.6–15.1)
EST. AVERAGE GLUCOSE BLD GHB EST-MCNC: 117 MG/DL
FERRITIN SERPL-MCNC: 9 NG/ML (ref 11–307)
GFR SERPL CREATININE-BSD FRML MDRD: 106 ML/MIN/1.73SQ M
GLUCOSE P FAST SERPL-MCNC: 90 MG/DL (ref 65–99)
HBA1C MFR BLD: 5.7 %
HCT VFR BLD AUTO: 39.9 % (ref 34.8–46.1)
HDLC SERPL-MCNC: 47 MG/DL
HGB BLD-MCNC: 12.3 G/DL (ref 11.5–15.4)
HIV 1+2 AB+HIV1 P24 AG SERPL QL IA: NORMAL
HIV 2 AB SERPL QL IA: NORMAL
HIV1 AB SERPL QL IA: NORMAL
HIV1 P24 AG SERPL QL IA: NORMAL
IMM GRANULOCYTES # BLD AUTO: 0.01 THOUSAND/UL (ref 0–0.2)
IMM GRANULOCYTES NFR BLD AUTO: 0 % (ref 0–2)
IRON SATN MFR SERPL: 12 % (ref 15–50)
IRON SERPL-MCNC: 46 UG/DL (ref 50–212)
LDLC SERPL CALC-MCNC: 113 MG/DL (ref 0–100)
LYMPHOCYTES # BLD AUTO: 2.59 THOUSANDS/ÂΜL (ref 0.6–4.47)
LYMPHOCYTES NFR BLD AUTO: 39 % (ref 14–44)
MCH RBC QN AUTO: 25.1 PG (ref 26.8–34.3)
MCHC RBC AUTO-ENTMCNC: 30.8 G/DL (ref 31.4–37.4)
MCV RBC AUTO: 81 FL (ref 82–98)
MONOCYTES # BLD AUTO: 0.38 THOUSAND/ÂΜL (ref 0.17–1.22)
MONOCYTES NFR BLD AUTO: 6 % (ref 4–12)
NEUTROPHILS # BLD AUTO: 3.45 THOUSANDS/ÂΜL (ref 1.85–7.62)
NEUTS SEG NFR BLD AUTO: 52 % (ref 43–75)
NONHDLC SERPL-MCNC: 133 MG/DL
NRBC BLD AUTO-RTO: 0 /100 WBCS
PLATELET # BLD AUTO: 345 THOUSANDS/UL (ref 149–390)
PMV BLD AUTO: 11.8 FL (ref 8.9–12.7)
POTASSIUM SERPL-SCNC: 3.6 MMOL/L (ref 3.5–5.3)
PROT SERPL-MCNC: 7 G/DL (ref 6.4–8.4)
RBC # BLD AUTO: 4.91 MILLION/UL (ref 3.81–5.12)
SODIUM SERPL-SCNC: 138 MMOL/L (ref 135–147)
TIBC SERPL-MCNC: 384 UG/DL (ref 250–450)
TRIGL SERPL-MCNC: 99 MG/DL
TSH SERPL DL<=0.05 MIU/L-ACNC: 2.49 UIU/ML (ref 0.45–4.5)
UIBC SERPL-MCNC: 338 UG/DL (ref 155–355)
WBC # BLD AUTO: 6.59 THOUSAND/UL (ref 4.31–10.16)

## 2024-02-23 PROCEDURE — 80061 LIPID PANEL: CPT

## 2024-02-23 PROCEDURE — 83540 ASSAY OF IRON: CPT

## 2024-02-23 PROCEDURE — 85025 COMPLETE CBC W/AUTO DIFF WBC: CPT

## 2024-02-23 PROCEDURE — 82728 ASSAY OF FERRITIN: CPT

## 2024-02-23 PROCEDURE — 83036 HEMOGLOBIN GLYCOSYLATED A1C: CPT

## 2024-02-23 PROCEDURE — 86803 HEPATITIS C AB TEST: CPT

## 2024-02-23 PROCEDURE — 84443 ASSAY THYROID STIM HORMONE: CPT

## 2024-02-23 PROCEDURE — 83550 IRON BINDING TEST: CPT

## 2024-02-23 PROCEDURE — 87389 HIV-1 AG W/HIV-1&-2 AB AG IA: CPT

## 2024-02-23 PROCEDURE — 36415 COLL VENOUS BLD VENIPUNCTURE: CPT

## 2024-02-23 PROCEDURE — 80053 COMPREHEN METABOLIC PANEL: CPT

## 2024-02-25 LAB
HCV AB S/CO SERPL IA: NON REACTIVE
SL AMB INTERPRETATION: NORMAL

## 2024-02-29 ENCOUNTER — TELEPHONE (OUTPATIENT)
Dept: PSYCHIATRY | Facility: CLINIC | Age: 49
End: 2024-02-29

## 2024-02-29 DIAGNOSIS — D50.9 IRON DEFICIENCY ANEMIA, UNSPECIFIED IRON DEFICIENCY ANEMIA TYPE: Primary | ICD-10-CM

## 2024-03-01 ENCOUNTER — TELEPHONE (OUTPATIENT)
Dept: HEMATOLOGY ONCOLOGY | Facility: CLINIC | Age: 49
End: 2024-03-01

## 2024-03-01 NOTE — TELEPHONE ENCOUNTER
I called [facility] in response to a referral that was received for patient to establish care with Hematology.     Outreach was made to schedule a consultation.    I left a voicemail explaining the reason for my call and advised patient to call [facility] at [phone].  Another attempt will be made to contact patient.

## 2024-03-11 ENCOUNTER — TELEPHONE (OUTPATIENT)
Dept: OTHER | Facility: OTHER | Age: 49
End: 2024-03-11

## 2024-03-19 ENCOUNTER — TELEPHONE (OUTPATIENT)
Age: 49
End: 2024-03-19

## 2024-03-19 NOTE — TELEPHONE ENCOUNTER
A Dx revision or update is needed for the 6/13/23 office visit.  The insurance company is denying the one code.

## 2024-03-20 NOTE — TELEPHONE ENCOUNTER
[facility], they were unsure of what was needed.  Please transfer call to office if billing department calls again.

## 2024-03-20 NOTE — TELEPHONE ENCOUNTER
Omkar Canchola  Can you please check to see what the problem is with the 2 diagnoses which are listed during her visit namely lichen sclerosis and history of high-grade cervical dysplasia  Thanks

## 2024-03-24 LAB
APOB+LDLR+PCSK9 GENE MUT ANL BLD/T: NOT DETECTED
BRCA1+BRCA2 DEL+DUP + FULL MUT ANL BLD/T: NOT DETECTED
MLH1+MSH2+MSH6+PMS2 GN DEL+DUP+FUL M: NOT DETECTED

## 2024-03-29 ENCOUNTER — TELEPHONE (OUTPATIENT)
Dept: HEMATOLOGY ONCOLOGY | Facility: CLINIC | Age: 49
End: 2024-03-29

## 2024-03-29 NOTE — TELEPHONE ENCOUNTER
Appointment Change  Cancel, Reschedule, Change to Virtual      Who are you speaking with? Patient   If it is not the patient, is the caller listed on the communication consent form? N/A   Which provider is the appointment scheduled with? RIAZ Caldwell   When was the original appointment scheduled?    Please list date and time 04/02/2024 @ 9AM    At which location is the appointment scheduled to take place? [place]   Was the appointment rescheduled?     Was the appointment changed from an in person visit to a virtual visit?    If so, please list the details of the change. Yes, 05/06/2024 @ 11AM with zakia ward    What is the reason for the appointment change? No reason given

## 2024-05-06 ENCOUNTER — APPOINTMENT (OUTPATIENT)
Dept: LAB | Facility: CLINIC | Age: 49
End: 2024-05-06
Payer: MEDICARE

## 2024-05-06 ENCOUNTER — OFFICE VISIT (OUTPATIENT)
Dept: HEMATOLOGY ONCOLOGY | Facility: CLINIC | Age: 49
End: 2024-05-06
Payer: MEDICARE

## 2024-05-06 VITALS
WEIGHT: 238.4 LBS | SYSTOLIC BLOOD PRESSURE: 120 MMHG | BODY MASS INDEX: 40.7 KG/M2 | OXYGEN SATURATION: 98 % | RESPIRATION RATE: 17 BRPM | HEIGHT: 64 IN | TEMPERATURE: 98 F | DIASTOLIC BLOOD PRESSURE: 84 MMHG | HEART RATE: 90 BPM

## 2024-05-06 DIAGNOSIS — E53.8 DISORDER OF VITAMIN B12: ICD-10-CM

## 2024-05-06 DIAGNOSIS — R71.8 MICROCYTOSIS: ICD-10-CM

## 2024-05-06 DIAGNOSIS — E53.8 LOW FOLATE: ICD-10-CM

## 2024-05-06 DIAGNOSIS — D50.9 IRON DEFICIENCY ANEMIA, UNSPECIFIED IRON DEFICIENCY ANEMIA TYPE: ICD-10-CM

## 2024-05-06 DIAGNOSIS — Z80.0 FAMILY HISTORY OF COLON CANCER: ICD-10-CM

## 2024-05-06 DIAGNOSIS — D50.9 IRON DEFICIENCY ANEMIA, UNSPECIFIED IRON DEFICIENCY ANEMIA TYPE: Primary | ICD-10-CM

## 2024-05-06 LAB
BASOPHILS # BLD AUTO: 0.09 THOUSAND/UL (ref 0–0.1)
BASOPHILS NFR MAR MANUAL: 1 % (ref 0–1)
EOSINOPHIL # BLD AUTO: 0.09 THOUSAND/UL (ref 0–0.61)
EOSINOPHIL NFR BLD MANUAL: 1 % (ref 0–6)
ERYTHROCYTE [DISTWIDTH] IN BLOOD BY AUTOMATED COUNT: 15.5 % (ref 11.6–15.1)
FERRITIN SERPL-MCNC: 5 NG/ML (ref 11–307)
FOLATE SERPL-MCNC: 7.7 NG/ML
HCT VFR BLD AUTO: 37.2 % (ref 34.8–46.1)
HGB BLD-MCNC: 11.8 G/DL (ref 11.5–15.4)
IRON SATN MFR SERPL: 9 % (ref 15–50)
IRON SERPL-MCNC: 33 UG/DL (ref 50–212)
LYMPHOCYTES # BLD AUTO: 2.55 THOUSAND/UL (ref 0.6–4.47)
LYMPHOCYTES # BLD AUTO: 25 %
MCH RBC QN AUTO: 25.9 PG (ref 26.8–34.3)
MCHC RBC AUTO-ENTMCNC: 31.7 G/DL (ref 31.4–37.4)
MCV RBC AUTO: 82 FL (ref 82–98)
MICROCYTES BLD QL AUTO: PRESENT
MONOCYTES # BLD AUTO: 0.26 THOUSAND/UL (ref 0–1.22)
MONOCYTES NFR BLD AUTO: 3 % (ref 4–12)
NEUTS BAND NFR BLD MANUAL: 1 % (ref 0–8)
NEUTS SEG # BLD: 5.81 THOUSAND/UL (ref 1.81–6.82)
NEUTS SEG NFR BLD AUTO: 65 %
NRBC BLD AUTO-RTO: 0 /100 WBCS
PLATELET # BLD AUTO: 332 THOUSANDS/UL (ref 149–390)
PLATELET BLD QL SMEAR: ADEQUATE
PMV BLD AUTO: 12 FL (ref 8.9–12.7)
RBC # BLD AUTO: 4.56 MILLION/UL (ref 3.81–5.12)
TIBC SERPL-MCNC: 368 UG/DL (ref 250–450)
TOTAL CELLS COUNTED SPEC: 100
UIBC SERPL-MCNC: 335 UG/DL (ref 155–355)
VARIANT LYMPHS # BLD AUTO: 4 % (ref 0–0)
VIT B12 SERPL-MCNC: 153 PG/ML (ref 180–914)
WBC # BLD AUTO: 8.81 THOUSAND/UL (ref 4.31–10.16)

## 2024-05-06 PROCEDURE — 82607 VITAMIN B-12: CPT

## 2024-05-06 PROCEDURE — 83540 ASSAY OF IRON: CPT

## 2024-05-06 PROCEDURE — 82746 ASSAY OF FOLIC ACID SERUM: CPT

## 2024-05-06 PROCEDURE — 82728 ASSAY OF FERRITIN: CPT

## 2024-05-06 PROCEDURE — 99204 OFFICE O/P NEW MOD 45 MIN: CPT | Performed by: NURSE PRACTITIONER

## 2024-05-06 PROCEDURE — 85007 BL SMEAR W/DIFF WBC COUNT: CPT

## 2024-05-06 PROCEDURE — 83550 IRON BINDING TEST: CPT

## 2024-05-06 PROCEDURE — 36415 COLL VENOUS BLD VENIPUNCTURE: CPT

## 2024-05-06 NOTE — PROGRESS NOTES
[facility] HEMATOLOGY ONCOLOGY SPECIALISTS [place]  [address] [facility] [address]  [phone]  Hematology Ambulatory Consult  Yaneth Waldron, 1975, [id number]  5/6/2024      Assessment and Plan   1. Iron deficiency anemia, unspecified iron deficiency anemia type  2. Disorder of vitamin B12  3. Low folate  4. Microcytosis  5. Family history of colon cancer   Patient is a 48-year-old female with a history of impaired fasting glucose, cervical intraepithelial neoplasia III follows with gynecology, elevated LDL, bipolar, who was referred to us for further evaluation and management of iron deficiency anemia.  Most recent labs from 2/23/2024 show a normal CBC and differential however MCV is low at 81.  Metabolic panel within normal limits, iron saturation 12% with a ferritin level of 9.  Patient reports fatigue, and headaches.  She does not have a history of bariatric surgery or current menorrhagia.  She has had heavy menses in the past.  She denies blood in her stools but states her sister we discussedhas colon cancer diagnosed around age 50.  Patient has undergone colonoscopy and had genetic testing that was negative for Kaufman syndrome.  She is up-to-date on mammogram as well.   Patient recently started on a multivitamin with iron.  We will get updated labs now and plan to see her in 1 month.  If all is stable and she is tolerating the multivitamin with iron we will likely stretch out visit to 3 months with repeat labs.    - Ambulatory Referral to Hematology / Oncology  - CBC and differential; Future  - Vitamin B12; Future  - Folate; Future  - Iron Panel (Includes Ferritin, Iron Sat%, Iron, and TIBC); Future  - Peripheral Smear; Future    Patient verbalized understanding and is in agreement to the plan. Patient knows to call the Hematology/Oncology office with any questions and concerns regarding the above.    Barrier(s) to care: None.   The patient is able to self  care.    Faina MELLO  Medical Oncology/Hematology  [facility]    Subjective     Chief Complaint   Patient presents with    Consult       Referring provider    RIAZ Yi  [address] [place] [address]    History of present illness:   Patient is a 48-year-old female with a history of impaired fasting glucose, cervical intraepithelial neoplasia III follows with gynecology, elevated LDL, bipolar, who was referred to us for further evaluation and management of iron deficiency anemia.    05/06/24: clinically stable    Review of Systems   Constitutional:  Positive for fatigue. Negative for activity change, appetite change, fever and unexpected weight change.   Respiratory:  Negative for cough and shortness of breath.    Cardiovascular:  Negative for chest pain and leg swelling.   Gastrointestinal:  Negative for abdominal pain, constipation, diarrhea and nausea.   Endocrine: Negative for cold intolerance and heat intolerance.   Musculoskeletal:  Negative for arthralgias and myalgias.   Skin: Negative.    Neurological:  Positive for headaches. Negative for dizziness and weakness.   Hematological:  Negative for adenopathy. Does not bruise/bleed easily.     Past Medical History:   Diagnosis Date    Anemia     Last assessed 9/25/2014    Anxiety     Bipolar 1 disorder (HCC)     Class 2 obesity due to excess calories without serious comorbidity with body mass index (BMI) of 37.0 to 37.9 in adult 8/19/2019    Depression     Diastasis recti     Last assessed 1/19/2017    Migraine     Last assessed 11/19/2014    Obesity (BMI 35.0-39.9 without comorbidity) 12/28/2018    PTSD (post-traumatic stress disorder)      Past Surgical History:   Procedure Laterality Date    BREAST EXCISIONAL BIOPSY Left     age 16    BREAST SURGERY      Lumpectomy    CERVICAL BIOPSY N/A 1/20/2022    Procedure: COLD KNIFE CONE AND VULVAR BIOPSIES;  Surgeon: Vera Kirkpatrick MD;  Location: [facility];   Service: Gynecology Oncology     SECTION      LAPAROSCOPY FOR ECTOPIC PREGNANCY      TUBAL LIGATION       Family History   Problem Relation Age of Onset    Diabetes Mother     Hyperlipidemia Mother     Hypertension Mother     Ovarian cancer Mother 68    Alcohol abuse Father     Substance Abuse Father     Mental illness Father     Colon cancer Sister 45    Leukemia Maternal Grandmother     Breast cancer Maternal Aunt         unknown age    No Known Problems Maternal Grandfather     No Known Problems Paternal Grandmother     No Known Problems Paternal Grandfather     No Known Problems Son     No Known Problems Son     No Known Problems Son     No Known Problems Son     No Known Problems Sister     Breast cancer Maternal Aunt         unknown age    Breast cancer Maternal Aunt         unknown age    Breast cancer Maternal Aunt         unknown age    Uterine cancer Maternal Aunt         unknonw age    No Known Problems Paternal Aunt     No Known Problems Paternal Aunt     No Known Problems Paternal Aunt     Testicular cancer Cousin 28    Cancer Maternal Aunt      Social History     Socioeconomic History    Marital status: Single     Spouse name: Not on file    Number of children: 4    Years of education: Not on file    Highest education level: Not on file   Occupational History    Not on file   Tobacco Use    Smoking status: Never    Smokeless tobacco: Never   Vaping Use    Vaping status: Never Used   Substance and Sexual Activity    Alcohol use: Yes     Comment: Rarely    Drug use: Never    Sexual activity: Yes     Partners: Male     Birth control/protection: Surgical   Other Topics Concern    Not on file   Social History Narrative    Child of  household    Disability-Mental Health     Social Determinants of Health     Financial Resource Strain: High Risk (2024)    Overall Financial Resource Strain (CARDIA)     Difficulty of Paying Living Expenses: Hard   Food Insecurity: No Food Insecurity  "(9/21/2021)    Hunger Vital Sign     Worried About Running Out of Food in the Last Year: Never true     Ran Out of Food in the Last Year: Never true   Transportation Needs: No Transportation Needs (2/19/2024)    PRAPARE - Transportation     Lack of Transportation (Medical): No     Lack of Transportation (Non-Medical): No   Physical Activity: Inactive (9/21/2021)    Exercise Vital Sign     Days of Exercise per Week: 0 days     Minutes of Exercise per Session: 0 min   Stress: No Stress Concern Present (9/21/2021)    [place] [employer] - Occupational Stress Questionnaire     Feeling of Stress : Not at all   Social Connections: Moderately Isolated (9/21/2021)    Social Connection and Isolation Panel [NHANES]     Frequency of Communication with Friends and Family: More than three times a week     Frequency of Social Gatherings with Friends and Family: Once a week     Attends Shinto Services: More than 4 times per year     Active Member of Clubs or Organizations: No     Attends Club or Organization Meetings: Never     Marital Status: Never    Intimate Partner Violence: Not At Risk (9/21/2021)    Humiliation, Afraid, Rape, and Kick questionnaire     Fear of Current or Ex-Partner: No     Emotionally Abused: No     Physically Abused: No     Sexually Abused: No   Housing Stability: Low Risk  (9/21/2021)    Housing Stability Vital Sign     Unable to Pay for Housing in the Last Year: No     Number of Places Lived in the Last Year: 1     Unstable Housing in the Last Year: No     Current Outpatient Medications:     triamcinolone (KENALOG) 0.025 % cream, Apply topically 2 (two) times a day, Disp: 30 g, Rfl: 2  Allergies   Allergen Reactions    Flagyl [Metronidazole] Hives    Pollen Extract Sneezing     Objective   /84 (BP Location: Left arm, Patient Position: Sitting, Cuff Size: Adult)   Pulse 90   Temp 98 °F (36.7 °C)   Resp 17   Ht 5' 4\" (1.626 m)   Wt 108 kg (238 lb 6.4 oz)   SpO2 98% "   BMI 40.92 kg/m²   Physical Exam  Vitals reviewed.   Constitutional:       Appearance: Normal appearance. She is well-developed.   HENT:      Head: Normocephalic and atraumatic.   Eyes:      Conjunctiva/sclera: Conjunctivae normal.      Pupils: Pupils are equal, round, and reactive to light.   Pulmonary:      Effort: Pulmonary effort is normal. No respiratory distress.   Musculoskeletal:         General: Normal range of motion.      Cervical back: Normal range of motion.   Lymphadenopathy:      Cervical: No cervical adenopathy.   Skin:     General: Skin is dry.   Neurological:      Mental Status: She is alert and oriented to person, place, and time.   Psychiatric:         Behavior: Behavior normal.     Result Review  Labs:  Lab Results   Component Value Date    WBC 6.59 02/23/2024    HGB 12.3 02/23/2024    HCT 39.9 02/23/2024    MCV 81 (L) 02/23/2024     02/23/2024     Lab Results   Component Value Date     11/19/2014    SODIUM 138 02/23/2024    K 3.6 02/23/2024     02/23/2024    CO2 27 02/23/2024    ANIONGAP 6 11/19/2014    AGAP 8 02/23/2024    BUN 9 02/23/2024    CREATININE 0.63 02/23/2024    GLUC 85 01/24/2017    GLUF 90 02/23/2024    CALCIUM 9.1 02/23/2024    AST 13 02/23/2024    ALT 11 02/23/2024    ALKPHOS 58 02/23/2024    PROT 8.8 (H) 11/19/2014    TP 7.0 02/23/2024    BILITOT 0.31 11/19/2014    TBILI 0.45 02/23/2024    EGFR 106 02/23/2024     Imaging:   No results found.    Please note:  This report has been generated by a voice recognition software system. Therefore there may be syntax, spelling, and/or grammatical errors. Please call if you have any questions.

## 2024-06-12 ENCOUNTER — TELEPHONE (OUTPATIENT)
Dept: OTHER | Facility: OTHER | Age: 49
End: 2024-06-12

## 2024-06-12 NOTE — TELEPHONE ENCOUNTER
Patient is calling regarding cancelling an appointment.     Date/Time: TODAY, 6/12 @ 11:00AM     Patient was rescheduled: YES [ ] NO [X ]     Patient requesting call back to reschedule: YES [ X] NO [ ]  *Patient would like a call back to assist with rescheduling.   Thank you!

## 2024-06-13 ENCOUNTER — OFFICE VISIT (OUTPATIENT)
Dept: OBGYN CLINIC | Facility: CLINIC | Age: 49
End: 2024-06-13
Payer: MEDICARE

## 2024-06-13 VITALS — WEIGHT: 235 LBS | SYSTOLIC BLOOD PRESSURE: 118 MMHG | DIASTOLIC BLOOD PRESSURE: 74 MMHG | BODY MASS INDEX: 40.34 KG/M2

## 2024-06-13 DIAGNOSIS — D06.9 CIN III (CERVICAL INTRAEPITHELIAL NEOPLASIA III): Primary | ICD-10-CM

## 2024-06-13 PROCEDURE — 99214 OFFICE O/P EST MOD 30 MIN: CPT | Performed by: OBSTETRICS & GYNECOLOGY

## 2024-06-13 NOTE — PATIENT INSTRUCTIONS
Topic history of LIZETH-3 with LEEP excision    Patient presents today for follow-up Pap smear secondary to history of LIZETH-3    Patient reports normal periods with no abnormal bleeding.    She denies any abnormal discharge as well as no significant pelvic or abdominal pain    We will see her back in 6 months for follow-up Pap smear along with her annual gynecologic and medical examination    Patient knows to call for any problems, questions, issues or concerns which may arise for her

## 2024-06-13 NOTE — PROGRESS NOTES
Assessment/Plan:      Diagnoses and all orders for this visit:    LIZETH III (cervical intraepithelial neoplasia III)  -     Thinprep Tis Pap Reflex HPV mRNA E6/E7          Subjective:     Patient ID: Yaneth Waldron is a 49 y.o. female.    Patient is a 49-year-old female with past medical history and surgical history of LIZETH-3 treated with cervical LEEP excision    Patient currently reports monthly cycles which last for several days without any excessive bleeding or clotting.  She also denies any significant pelvic or abdominal pain.  Patient denies any significant bleeding or spotting between her cycles as well.    Patient denies any abnormal vaginal discharge    She reports normal bowel bladder habits.  She denies any hematuria, frequency, dysuria or urgency.  She also denies any fever shakes or chills.    Patient denies any pelvic or abdominal pain    Patient reports normal appetite    She denies any chest pain or shortness of breath as well.    Follow-up Pap smear was completed at today's visit    All questions were answered for her during today's visit    We will see her in 6 months for follow-up Pap smear with her annual gynecologic and medical examination    Patient knows to call for any problems, questions, issues or concerns which may arise for her      Total time of today's visit was 25 minutes of which greater than 50% was spent face-to-face counseling the patient as well as coordination of care, review of chart and lab values, physical examination as well as computer entry into the [facility] medical record system.          Review of Systems   Constitutional: Negative.  Negative for appetite change, diaphoresis, fatigue, fever and unexpected weight change.   HENT: Negative.     Eyes: Negative.    Respiratory: Negative.     Cardiovascular: Negative.    Gastrointestinal: Negative.  Negative for abdominal pain, blood in stool, constipation, diarrhea, nausea and vomiting.   Endocrine: Negative.  Negative for cold  intolerance and heat intolerance.   Genitourinary: Negative.  Negative for dysuria, frequency, hematuria, urgency, vaginal bleeding, vaginal discharge and vaginal pain.   Musculoskeletal: Negative.    Skin: Negative.    Allergic/Immunologic: Negative.    Neurological: Negative.    Hematological: Negative.  Negative for adenopathy.   Psychiatric/Behavioral: Negative.           Objective:     Physical Exam  Constitutional:       Appearance: She is well-developed.   HENT:      Head: Normocephalic.   Eyes:      Pupils: Pupils are equal, round, and reactive to light.   Cardiovascular:      Rate and Rhythm: Normal rate.   Pulmonary:      Effort: Pulmonary effort is normal.   Abdominal:      Palpations: Abdomen is soft.   Genitourinary:     General: Normal vulva.      Labia:         Right: No rash, tenderness, lesion or injury.         Left: No rash, tenderness, lesion or injury.       Urethra: No urethral swelling or urethral lesion.      Vagina: No vaginal discharge, erythema, bleeding or lesions.      Cervix: Normal. No discharge, lesion or erythema.      Uterus: Normal. Not enlarged, not tender and no uterine prolapse.       Adnexa: Right adnexa normal and left adnexa normal.        Right: No mass, tenderness or fullness.          Left: No mass, tenderness or fullness.        Comments: Pelvic examination was unremarkable  Good pelvic support was confirmed  Follow-up Pap smear completed  Musculoskeletal:         General: Normal range of motion.      Cervical back: Normal range of motion and neck supple.   Skin:     General: Skin is warm and dry.   Neurological:      Mental Status: She is alert and oriented to person, place, and time.   Psychiatric:         Mood and Affect: Mood normal.         Behavior: Behavior normal.         Thought Content: Thought content normal.         Judgment: Judgment normal.

## 2024-06-14 ENCOUNTER — TELEPHONE (OUTPATIENT)
Dept: HEMATOLOGY ONCOLOGY | Facility: CLINIC | Age: 49
End: 2024-06-14

## 2024-06-14 DIAGNOSIS — D50.9 IRON DEFICIENCY ANEMIA, UNSPECIFIED IRON DEFICIENCY ANEMIA TYPE: ICD-10-CM

## 2024-06-14 DIAGNOSIS — E53.8 B12 DEFICIENCY: Primary | ICD-10-CM

## 2024-06-14 RX ORDER — SODIUM CHLORIDE 9 MG/ML
20 INJECTION, SOLUTION INTRAVENOUS ONCE
OUTPATIENT
Start: 2024-06-26

## 2024-06-14 RX ORDER — CYANOCOBALAMIN 1000 UG/ML
1000 INJECTION, SOLUTION INTRAMUSCULAR; SUBCUTANEOUS ONCE
OUTPATIENT
Start: 2024-06-26 | End: 2024-06-26

## 2024-06-14 NOTE — TELEPHONE ENCOUNTER
Please schedule patient for iron and vitamin B12 infusions within the next 2 weeks at [facility].

## 2024-06-19 LAB
CLINICAL INFO: NORMAL
CYTO CVX: NORMAL
CYTOLOGY CMNT CVX/VAG CYTO-IMP: NORMAL
DATE PREVIOUS BX: NORMAL
LMP START DATE: NORMAL
SL AMB PREV. PAP:: NORMAL
SPECIMEN SOURCE CVX/VAG CYTO: NORMAL

## 2024-06-26 ENCOUNTER — TELEPHONE (OUTPATIENT)
Dept: PSYCHIATRY | Facility: CLINIC | Age: 49
End: 2024-06-26

## 2024-06-26 NOTE — TELEPHONE ENCOUNTER
[facility] and expressed interest in receiving talk therapy services. Writer will update patient's wait list status.

## 2024-07-09 ENCOUNTER — TELEPHONE (OUTPATIENT)
Dept: INFUSION CENTER | Facility: HOSPITAL | Age: 49
End: 2024-07-09

## 2024-07-10 ENCOUNTER — HOSPITAL ENCOUNTER (OUTPATIENT)
Dept: INFUSION CENTER | Facility: HOSPITAL | Age: 49
Discharge: HOME | End: 2024-07-10
Attending: INTERNAL MEDICINE
Payer: MEDICARE

## 2024-07-10 VITALS — DIASTOLIC BLOOD PRESSURE: 83 MMHG | SYSTOLIC BLOOD PRESSURE: 125 MMHG | TEMPERATURE: 97.5 F | HEART RATE: 84 BPM

## 2024-07-10 DIAGNOSIS — E53.8 B12 DEFICIENCY: ICD-10-CM

## 2024-07-10 DIAGNOSIS — D50.9 IRON DEFICIENCY ANEMIA, UNSPECIFIED IRON DEFICIENCY ANEMIA TYPE: Primary | ICD-10-CM

## 2024-07-10 RX ORDER — CYANOCOBALAMIN 1000 UG/ML
1000 INJECTION, SOLUTION INTRAMUSCULAR; SUBCUTANEOUS ONCE
Status: COMPLETED | OUTPATIENT
Start: 2024-07-10 | End: 2024-07-10

## 2024-07-10 RX ORDER — SODIUM CHLORIDE 9 MG/ML
20 INJECTION, SOLUTION INTRAVENOUS ONCE
Status: CANCELLED | OUTPATIENT
Start: 2024-07-17

## 2024-07-10 RX ORDER — SODIUM CHLORIDE 9 MG/ML
20 INJECTION, SOLUTION INTRAVENOUS ONCE
Status: COMPLETED | OUTPATIENT
Start: 2024-07-10 | End: 2024-07-10

## 2024-07-10 RX ORDER — CYANOCOBALAMIN 1000 UG/ML
1000 INJECTION, SOLUTION INTRAMUSCULAR; SUBCUTANEOUS ONCE
Status: CANCELLED | OUTPATIENT
Start: 2024-07-17 | End: 2024-07-17

## 2024-07-10 RX ADMIN — CYANOCOBALAMIN 1000 MCG: 1000 INJECTION, SOLUTION INTRAMUSCULAR at 13:12

## 2024-07-10 RX ADMIN — IRON SUCROSE 300 MG: 20 INJECTION, SOLUTION INTRAVENOUS at 11:43

## 2024-07-10 RX ADMIN — SODIUM CHLORIDE 20 ML/HR: 0.9 INJECTION, SOLUTION INTRAVENOUS at 11:37

## 2024-07-10 NOTE — PROGRESS NOTES
Yaneth Waldron  tolerated treatment well with no complications.      Yaneth Waldron is aware of future appt on 7/17/24 at 1230.     AVS declined by Yaneth Waldron.

## 2024-07-17 ENCOUNTER — HOSPITAL ENCOUNTER (OUTPATIENT)
Dept: INFUSION CENTER | Facility: HOSPITAL | Age: 49
Discharge: HOME | End: 2024-07-17
Attending: INTERNAL MEDICINE
Payer: MEDICARE

## 2024-07-17 VITALS
SYSTOLIC BLOOD PRESSURE: 129 MMHG | TEMPERATURE: 97 F | HEART RATE: 76 BPM | DIASTOLIC BLOOD PRESSURE: 82 MMHG | RESPIRATION RATE: 16 BRPM

## 2024-07-17 DIAGNOSIS — D50.9 IRON DEFICIENCY ANEMIA, UNSPECIFIED IRON DEFICIENCY ANEMIA TYPE: Primary | ICD-10-CM

## 2024-07-17 DIAGNOSIS — E53.8 B12 DEFICIENCY: ICD-10-CM

## 2024-07-17 PROCEDURE — 96365 THER/PROPH/DIAG IV INF INIT: CPT

## 2024-07-17 PROCEDURE — 96372 THER/PROPH/DIAG INJ SC/IM: CPT

## 2024-07-17 RX ORDER — CYANOCOBALAMIN 1000 UG/ML
1000 INJECTION, SOLUTION INTRAMUSCULAR; SUBCUTANEOUS ONCE
Status: CANCELLED | OUTPATIENT
Start: 2024-07-24 | End: 2024-07-24

## 2024-07-17 RX ORDER — CYANOCOBALAMIN 1000 UG/ML
1000 INJECTION, SOLUTION INTRAMUSCULAR; SUBCUTANEOUS ONCE
Status: COMPLETED | OUTPATIENT
Start: 2024-07-17 | End: 2024-07-17

## 2024-07-17 RX ORDER — SODIUM CHLORIDE 9 MG/ML
20 INJECTION, SOLUTION INTRAVENOUS ONCE
Status: COMPLETED | OUTPATIENT
Start: 2024-07-17 | End: 2024-07-17

## 2024-07-17 RX ORDER — SODIUM CHLORIDE 9 MG/ML
20 INJECTION, SOLUTION INTRAVENOUS ONCE
Status: CANCELLED | OUTPATIENT
Start: 2024-07-24

## 2024-07-17 RX ADMIN — CYANOCOBALAMIN 1000 MCG: 1000 INJECTION, SOLUTION INTRAMUSCULAR at 14:11

## 2024-07-17 RX ADMIN — IRON SUCROSE 300 MG: 20 INJECTION, SOLUTION INTRAVENOUS at 12:44

## 2024-07-17 RX ADMIN — SODIUM CHLORIDE 20 ML/HR: 0.9 INJECTION, SOLUTION INTRAVENOUS at 12:42

## 2024-07-18 ENCOUNTER — TELEPHONE (OUTPATIENT)
Dept: HEMATOLOGY ONCOLOGY | Facility: CLINIC | Age: 49
End: 2024-07-18

## 2024-07-18 NOTE — TELEPHONE ENCOUNTER
Left message on voicemail to call back regarding her appointment.  She does not need to come in on Monday unless there is something going on.  She is ok to reschedule for October.  My chart message also sent.

## 2024-07-24 ENCOUNTER — HOSPITAL ENCOUNTER (OUTPATIENT)
Dept: INFUSION CENTER | Facility: HOSPITAL | Age: 49
Discharge: HOME | End: 2024-07-24
Attending: INTERNAL MEDICINE
Payer: MEDICARE

## 2024-07-24 VITALS
DIASTOLIC BLOOD PRESSURE: 76 MMHG | SYSTOLIC BLOOD PRESSURE: 113 MMHG | RESPIRATION RATE: 16 BRPM | TEMPERATURE: 96.8 F | HEART RATE: 72 BPM

## 2024-07-24 DIAGNOSIS — E53.8 B12 DEFICIENCY: ICD-10-CM

## 2024-07-24 DIAGNOSIS — D50.9 IRON DEFICIENCY ANEMIA, UNSPECIFIED IRON DEFICIENCY ANEMIA TYPE: Primary | ICD-10-CM

## 2024-07-24 PROCEDURE — 96366 THER/PROPH/DIAG IV INF ADDON: CPT

## 2024-07-24 PROCEDURE — 96372 THER/PROPH/DIAG INJ SC/IM: CPT

## 2024-07-24 PROCEDURE — 96365 THER/PROPH/DIAG IV INF INIT: CPT

## 2024-07-24 RX ORDER — SODIUM CHLORIDE 9 MG/ML
20 INJECTION, SOLUTION INTRAVENOUS ONCE
Status: COMPLETED | OUTPATIENT
Start: 2024-07-24 | End: 2024-07-24

## 2024-07-24 RX ORDER — SODIUM CHLORIDE 9 MG/ML
20 INJECTION, SOLUTION INTRAVENOUS ONCE
Status: CANCELLED | OUTPATIENT
Start: 2024-07-24

## 2024-07-24 RX ORDER — CYANOCOBALAMIN 1000 UG/ML
1000 INJECTION, SOLUTION INTRAMUSCULAR; SUBCUTANEOUS ONCE
Status: CANCELLED | OUTPATIENT
Start: 2024-07-24 | End: 2024-07-24

## 2024-07-24 RX ORDER — CYANOCOBALAMIN 1000 UG/ML
1000 INJECTION, SOLUTION INTRAMUSCULAR; SUBCUTANEOUS ONCE
Status: COMPLETED | OUTPATIENT
Start: 2024-07-24 | End: 2024-07-24

## 2024-07-24 RX ADMIN — CYANOCOBALAMIN 1000 MCG: 1000 INJECTION, SOLUTION INTRAMUSCULAR; SUBCUTANEOUS at 12:59

## 2024-07-24 RX ADMIN — SODIUM CHLORIDE 20 ML/HR: 0.9 INJECTION, SOLUTION INTRAVENOUS at 11:17

## 2024-07-24 RX ADMIN — IRON SUCROSE 300 MG: 20 INJECTION, SOLUTION INTRAVENOUS at 11:17

## 2024-08-12 DIAGNOSIS — L90.0 LICHEN SCLEROSUS: ICD-10-CM

## 2024-08-13 RX ORDER — TRIAMCINOLONE ACETONIDE 0.25 MG/G
CREAM TOPICAL 2 TIMES DAILY
Qty: 30 G | Refills: 5 | Status: SHIPPED | OUTPATIENT
Start: 2024-08-13

## 2024-08-21 ENCOUNTER — OFFICE VISIT (OUTPATIENT)
Dept: FAMILY MEDICINE CLINIC | Facility: CLINIC | Age: 49
End: 2024-08-21
Payer: MEDICARE

## 2024-08-21 VITALS
WEIGHT: 237.6 LBS | RESPIRATION RATE: 16 BRPM | HEIGHT: 64 IN | TEMPERATURE: 99.1 F | OXYGEN SATURATION: 98 % | SYSTOLIC BLOOD PRESSURE: 124 MMHG | BODY MASS INDEX: 40.56 KG/M2 | DIASTOLIC BLOOD PRESSURE: 78 MMHG | HEART RATE: 79 BPM

## 2024-08-21 DIAGNOSIS — R53.83 FATIGUE, UNSPECIFIED TYPE: ICD-10-CM

## 2024-08-21 DIAGNOSIS — E53.8 FOLATE DEFICIENCY: ICD-10-CM

## 2024-08-21 DIAGNOSIS — R73.01 IFG (IMPAIRED FASTING GLUCOSE): ICD-10-CM

## 2024-08-21 DIAGNOSIS — E53.8 B12 DEFICIENCY: ICD-10-CM

## 2024-08-21 DIAGNOSIS — E55.9 VITAMIN D DEFICIENCY: ICD-10-CM

## 2024-08-21 DIAGNOSIS — D06.9 CIN III (CERVICAL INTRAEPITHELIAL NEOPLASIA III): ICD-10-CM

## 2024-08-21 DIAGNOSIS — F31.77 BIPOLAR DISORDER, IN PARTIAL REMISSION, MOST RECENT EPISODE MIXED (HCC): ICD-10-CM

## 2024-08-21 DIAGNOSIS — E66.01 CLASS 3 SEVERE OBESITY DUE TO EXCESS CALORIES WITHOUT SERIOUS COMORBIDITY WITH BODY MASS INDEX (BMI) OF 40.0 TO 44.9 IN ADULT (HCC): Primary | ICD-10-CM

## 2024-08-21 DIAGNOSIS — D50.9 IRON DEFICIENCY ANEMIA, UNSPECIFIED IRON DEFICIENCY ANEMIA TYPE: ICD-10-CM

## 2024-08-21 DIAGNOSIS — R06.83 SNORING: ICD-10-CM

## 2024-08-21 DIAGNOSIS — E78.00 ELEVATED LDL CHOLESTEROL LEVEL: Chronic | ICD-10-CM

## 2024-08-21 PROCEDURE — 99214 OFFICE O/P EST MOD 30 MIN: CPT | Performed by: NURSE PRACTITIONER

## 2024-08-21 PROCEDURE — G2211 COMPLEX E/M VISIT ADD ON: HCPCS | Performed by: NURSE PRACTITIONER

## 2024-08-21 NOTE — ASSESSMENT & PLAN NOTE
Stable and managed by Gynecology, sees GYN every 6 months and is up to date with this   She is s/p cold knife cone and vulvar bx 2/2022 (margins negative)

## 2024-08-21 NOTE — ASSESSMENT & PLAN NOTE
Yaneth is currently on a waiting list for CBT with [facility]  I did encourage her to reach out to her insurance for other offices in the area that might be taking new patients   She declined any medications at this time

## 2024-08-21 NOTE — ASSESSMENT & PLAN NOTE
Updated lab work including CBC, iron panel, folate, Vitamin B12 ordered today  She did receive 3 iron infusions via Hematology but still reports fatigue  No current oral iron supplements   F/u with Hematology as scheduled

## 2024-08-21 NOTE — PROGRESS NOTES
Ambulatory Visit  Name: Yaneth Waldron      : 1975      MRN: [id number]  Encounter Provider: RIAZ Yi  Encounter Date: 2024   Encounter department: [facility]    Assessment & Plan   1. Class 3 severe obesity due to excess calories without serious comorbidity with body mass index (BMI) of 40.0 to 44.9 in adult ([facility])  Assessment & Plan:  Encouraged lifestyle modifications  No interest in seeing Weight Management   Orders:  -     Comprehensive metabolic panel; Future  -     Ambulatory Referral to Sleep Medicine; Future  2. Bipolar disorder, in partial remission, most recent episode mixed ([facility])  Assessment & Plan:  Yaenth is currently on a waiting list for CBT with [facility]  I did encourage her to reach out to her insurance for other offices in the area that might be taking new patients   She declined any medications at this time  Orders:  -     TSH, 3rd generation with Free T4 reflex; Future  3. LIZETH III (cervical intraepithelial neoplasia III)  Assessment & Plan:  Stable and managed by Gynecology, sees GYN every 6 months and is up to date with this   She is s/p cold knife cone and vulvar bx 2022 (margins negative)  4. IFG (impaired fasting glucose)  Assessment & Plan:  Updated A1C and FBS ordered today  Follow a low carb/ low sugar diet  Work on regular exercise   Orders:  -     Hemoglobin A1C; Future  -     Comprehensive metabolic panel; Future  5. Elevated LDL cholesterol level  Assessment & Plan:  Updated lipid panel ordered  Current ASCVD risk at 0.9%  Orders:  -     Lipid panel; Future  6. Iron deficiency anemia, unspecified iron deficiency anemia type  Assessment & Plan:  Updated lab work including CBC, iron panel, folate, Vitamin B12 ordered today  She did receive 3 iron infusions via Hematology but still reports fatigue  No current oral iron supplements   F/u with Hematology as scheduled   Orders:  -     CBC and differential; Future  -     Iron Panel  (Includes Ferritin, Iron Sat%, Iron, and TIBC); Future  -     Folate; Future  7. B12 deficiency  Assessment & Plan:  Updated vitamin B12 level ordered  No current oral B12 supplements  She received 3 B12 injections via Hematology  Orders:  -     Vitamin B12; Future  8. Vitamin D deficiency  -     Vitamin D 25 hydroxy; Future  9. Fatigue, unspecified type  Comments:  Updated lab work ordered.  Home sleep study ordered to evaluate for JOHN.  +fatigue despite sleeping well, +snoring, BMI 40.78  Orders:  -     Ambulatory Referral to Sleep Medicine; Future  10. Snoring  -     Ambulatory Referral to Sleep Medicine; Future  11. Folate deficiency  -     Folate; Future       History of Present Illness     CRISTI Wolfe presents by herself today for a routine follow up   Doing well overall    She does note ongoing fatigue. She sleeps well at night, but does not feel rested in the morning.  Tired throughout the day.  +snores.  BMI at 40.78.  No witnessed or known nocturnal gasping or choking.      Colonoscopy last 11/2021, due for repeat 2025     Pt follows with Gynecology for LIZETH III, sees every 6 months for LIZETH-3 treated with cervical LEEP excision.  Last seen 6/13/24     Pt notes a diagnosis of bipolar disorder- notes she has seen a therapist in the past and is currently on a waiting list to begin therapy again.  No current medications and she denies wanting to start anything now.  She does note a good support system     Pt recently established care with Hematology for iron deficiency anemia, low folate, and vitamin B12 deficiency.  She received 3 iron infusions and 3 Vitamin B12 injections.  Last seen by Hematology 5/6/24.  Not currently on any oral iron or B12 supplements.  She notes that she didn't really feel any different after the iron infusions, still tired    Review of Systems   Constitutional:  Positive for fatigue. Negative for activity change, appetite change, chills, diaphoresis, fever and unexpected weight  "change.   Eyes:  Negative for visual disturbance.   Respiratory:  Negative for cough, chest tightness, shortness of breath and wheezing.    Cardiovascular:  Negative for chest pain, palpitations and leg swelling.   Gastrointestinal:  Negative for abdominal pain, blood in stool, constipation, diarrhea and nausea.   Genitourinary:  Negative for dysuria.   Musculoskeletal:  Negative for arthralgias and myalgias.   Skin:  Negative for rash and wound.   Neurological:  Negative for dizziness, weakness, numbness and headaches.   Psychiatric/Behavioral:  Negative for dysphoric mood, self-injury, sleep disturbance and suicidal ideas. The patient is not nervous/anxious.        Objective     /78 (BP Location: Left arm, Patient Position: Sitting, Cuff Size: Large)   Pulse 79   Temp 99.1 °F (37.3 °C) (Tympanic)   Resp 16   Ht 5' 4\" (1.626 m)   Wt 108 kg (237 lb 9.6 oz)   SpO2 98%   BMI 40.78 kg/m²     Physical Exam  Constitutional:       General: She is not in acute distress.     Appearance: She is well-developed. She is obese. She is not ill-appearing, toxic-appearing or diaphoretic.   HENT:      Head: Normocephalic and atraumatic.      Mouth/Throat:      Mouth: Mucous membranes are moist.   Eyes:      Extraocular Movements: Extraocular movements intact.      Conjunctiva/sclera: Conjunctivae normal.      Pupils: Pupils are equal, round, and reactive to light.   Neck:      Thyroid: No thyromegaly.   Cardiovascular:      Rate and Rhythm: Normal rate and regular rhythm.      Heart sounds: Normal heart sounds. No murmur heard.  Pulmonary:      Effort: Pulmonary effort is normal. No respiratory distress.      Breath sounds: Normal breath sounds. No wheezing.   Abdominal:      General: Bowel sounds are normal. There is no distension.      Palpations: Abdomen is soft.      Tenderness: There is no abdominal tenderness.   Musculoskeletal:         General: Normal range of motion.      Cervical back: Normal range of motion " and neck supple. No rigidity or tenderness.   Lymphadenopathy:      Cervical: No cervical adenopathy.   Skin:     General: Skin is warm and dry.   Neurological:      General: No focal deficit present.      Mental Status: She is alert and oriented to person, place, and time.   Psychiatric:         Mood and Affect: Mood normal.         Behavior: Behavior normal.         Thought Content: Thought content normal.         Judgment: Judgment normal.       Administrative Statements

## 2024-08-21 NOTE — ASSESSMENT & PLAN NOTE
Updated vitamin B12 level ordered  No current oral B12 supplements  She received 3 B12 injections via Hematology

## 2024-09-04 ENCOUNTER — TRANSCRIBE ORDERS (OUTPATIENT)
Dept: SLEEP CENTER | Facility: CLINIC | Age: 49
End: 2024-09-04

## 2024-09-04 DIAGNOSIS — R06.83 SNORING: ICD-10-CM

## 2024-09-04 DIAGNOSIS — G47.30 SLEEP APNEA, UNSPECIFIED TYPE: Primary | ICD-10-CM

## 2024-09-04 DIAGNOSIS — E66.01 CLASS 3 SEVERE OBESITY DUE TO EXCESS CALORIES WITHOUT SERIOUS COMORBIDITY WITH BODY MASS INDEX (BMI) OF 40.0 TO 44.9 IN ADULT (HCC): ICD-10-CM

## 2024-09-04 DIAGNOSIS — R53.83 FATIGUE, UNSPECIFIED TYPE: ICD-10-CM

## 2024-10-04 ENCOUNTER — HOSPITAL ENCOUNTER (OUTPATIENT)
Dept: SLEEP CENTER | Facility: CLINIC | Age: 49
Discharge: HOME | End: 2024-10-04

## 2024-10-04 DIAGNOSIS — R06.83 SNORING: ICD-10-CM

## 2024-10-04 DIAGNOSIS — E66.01 CLASS 3 SEVERE OBESITY DUE TO EXCESS CALORIES WITHOUT SERIOUS COMORBIDITY WITH BODY MASS INDEX (BMI) OF 40.0 TO 44.9 IN ADULT (HCC): ICD-10-CM

## 2024-10-04 DIAGNOSIS — R53.83 FATIGUE, UNSPECIFIED TYPE: ICD-10-CM

## 2024-10-04 DIAGNOSIS — E66.813 CLASS 3 SEVERE OBESITY DUE TO EXCESS CALORIES WITHOUT SERIOUS COMORBIDITY WITH BODY MASS INDEX (BMI) OF 40.0 TO 44.9 IN ADULT (HCC): ICD-10-CM

## 2024-10-04 DIAGNOSIS — G47.30 SLEEP APNEA, UNSPECIFIED TYPE: ICD-10-CM

## 2024-10-04 NOTE — PROGRESS NOTES
Home Sleep Study Documentation    HOME STUDY DEVICE: Noxturnal no                                           Alma Delia G3 yes device # 7      Pre-Sleep Home Study:    Set-up and instructions performed by: Priscila    Technician performed demonstration for Patient: yes    Return demonstration performed by Patient: yes    Written instructions provided to Patient: yes    Patient signed consent form: yes        Post-Sleep Home Study:    Failed HST - Cannula removed for most of the night.    Home Sleep Study Failed:yes:     Failure reason: Failed Study sensor     Reported or Detected: detected    Scored by: TATI Ghotra

## 2024-10-08 ENCOUNTER — TELEPHONE (OUTPATIENT)
Dept: SLEEP CENTER | Facility: CLINIC | Age: 49
End: 2024-10-08

## 2024-10-08 NOTE — TELEPHONE ENCOUNTER
Left message regarding failed home sleep test, asking patient to call and reschedule at [phone].

## 2024-10-11 ENCOUNTER — TELEPHONE (OUTPATIENT)
Dept: HEMATOLOGY ONCOLOGY | Facility: CLINIC | Age: 49
End: 2024-10-11

## 2024-10-18 ENCOUNTER — TELEPHONE (OUTPATIENT)
Dept: HEMATOLOGY ONCOLOGY | Facility: CLINIC | Age: 49
End: 2024-10-18

## 2024-10-18 DIAGNOSIS — R79.0 LOW FERRITIN: ICD-10-CM

## 2024-10-18 DIAGNOSIS — D64.9 ANEMIA, UNSPECIFIED TYPE: Primary | ICD-10-CM

## 2024-10-18 DIAGNOSIS — Z78.9 VEGAN DIET: ICD-10-CM

## 2024-10-18 DIAGNOSIS — D53.9 NUTRITIONAL ANEMIA: ICD-10-CM

## 2024-10-18 DIAGNOSIS — E53.8 VITAMIN B 12 DEFICIENCY: ICD-10-CM

## 2024-10-18 DIAGNOSIS — E53.8 FOLATE DEFICIENCY: ICD-10-CM

## 2024-10-18 NOTE — TELEPHONE ENCOUNTER
Message left on voicemail as a reminder to complete labs ordered before upcoming appointment with Faina.

## 2024-10-20 ENCOUNTER — APPOINTMENT (OUTPATIENT)
Dept: LAB | Facility: CLINIC | Age: 49
End: 2024-10-20
Payer: COMMERCIAL

## 2024-10-20 DIAGNOSIS — D64.9 ANEMIA, UNSPECIFIED TYPE: ICD-10-CM

## 2024-10-20 DIAGNOSIS — R79.0 LOW FERRITIN: ICD-10-CM

## 2024-10-20 DIAGNOSIS — F31.77 BIPOLAR DISORDER, IN PARTIAL REMISSION, MOST RECENT EPISODE MIXED (HCC): ICD-10-CM

## 2024-10-20 DIAGNOSIS — D50.9 IRON DEFICIENCY ANEMIA, UNSPECIFIED IRON DEFICIENCY ANEMIA TYPE: ICD-10-CM

## 2024-10-20 DIAGNOSIS — E53.8 B12 DEFICIENCY: ICD-10-CM

## 2024-10-20 DIAGNOSIS — Z78.9 VEGAN DIET: ICD-10-CM

## 2024-10-20 DIAGNOSIS — E66.813 CLASS 3 SEVERE OBESITY DUE TO EXCESS CALORIES WITHOUT SERIOUS COMORBIDITY WITH BODY MASS INDEX (BMI) OF 40.0 TO 44.9 IN ADULT (HCC): ICD-10-CM

## 2024-10-20 DIAGNOSIS — E66.01 CLASS 3 SEVERE OBESITY DUE TO EXCESS CALORIES WITHOUT SERIOUS COMORBIDITY WITH BODY MASS INDEX (BMI) OF 40.0 TO 44.9 IN ADULT (HCC): ICD-10-CM

## 2024-10-20 DIAGNOSIS — D53.9 NUTRITIONAL ANEMIA: ICD-10-CM

## 2024-10-20 DIAGNOSIS — E53.8 FOLATE DEFICIENCY: ICD-10-CM

## 2024-10-20 DIAGNOSIS — E55.9 VITAMIN D DEFICIENCY: ICD-10-CM

## 2024-10-20 DIAGNOSIS — E53.8 VITAMIN B 12 DEFICIENCY: ICD-10-CM

## 2024-10-20 DIAGNOSIS — R73.01 IFG (IMPAIRED FASTING GLUCOSE): ICD-10-CM

## 2024-10-20 DIAGNOSIS — E78.00 ELEVATED LDL CHOLESTEROL LEVEL: Chronic | ICD-10-CM

## 2024-10-20 LAB
25(OH)D3 SERPL-MCNC: 12.3 NG/ML (ref 30–100)
ALBUMIN SERPL BCG-MCNC: 3.8 G/DL (ref 3.5–5)
ALP SERPL-CCNC: 60 U/L (ref 34–104)
ALT SERPL W P-5'-P-CCNC: 14 U/L (ref 7–52)
ANION GAP SERPL CALCULATED.3IONS-SCNC: 8 MMOL/L (ref 4–13)
ANISOCYTOSIS BLD QL SMEAR: PRESENT
AST SERPL W P-5'-P-CCNC: 14 U/L (ref 13–39)
BILIRUB SERPL-MCNC: 0.43 MG/DL (ref 0.2–1)
BUN SERPL-MCNC: 11 MG/DL (ref 5–25)
CALCIUM SERPL-MCNC: 8.9 MG/DL (ref 8.4–10.2)
CHLORIDE SERPL-SCNC: 101 MMOL/L (ref 96–108)
CHOLEST SERPL-MCNC: 177 MG/DL
CO2 SERPL-SCNC: 26 MMOL/L (ref 21–32)
CREAT SERPL-MCNC: 0.69 MG/DL (ref 0.6–1.3)
EOSINOPHIL # BLD AUTO: 0.08 THOUSAND/UL (ref 0–0.61)
EOSINOPHIL NFR BLD MANUAL: 1 % (ref 0–6)
ERYTHROCYTE [DISTWIDTH] IN BLOOD BY AUTOMATED COUNT: 14.6 % (ref 11.6–15.1)
EST. AVERAGE GLUCOSE BLD GHB EST-MCNC: 111 MG/DL
FERRITIN SERPL-MCNC: 46 NG/ML (ref 11–307)
FOLATE SERPL-MCNC: 9.3 NG/ML
GFR SERPL CREATININE-BSD FRML MDRD: 102 ML/MIN/1.73SQ M
GLUCOSE P FAST SERPL-MCNC: 101 MG/DL (ref 65–99)
HBA1C MFR BLD: 5.5 %
HCT VFR BLD AUTO: 43.5 % (ref 34.8–46.1)
HDLC SERPL-MCNC: 47 MG/DL
HGB BLD-MCNC: 13.8 G/DL (ref 11.5–15.4)
IRON SATN MFR SERPL: 23 % (ref 15–50)
IRON SERPL-MCNC: 76 UG/DL (ref 50–212)
LDLC SERPL CALC-MCNC: 94 MG/DL (ref 0–100)
LYMPHOCYTES # BLD AUTO: 2.75 THOUSAND/UL (ref 0.6–4.47)
LYMPHOCYTES # BLD AUTO: 30 %
MCH RBC QN AUTO: 27.7 PG (ref 26.8–34.3)
MCHC RBC AUTO-ENTMCNC: 31.7 G/DL (ref 31.4–37.4)
MCV RBC AUTO: 87 FL (ref 82–98)
MONOCYTES # BLD AUTO: 0.65 THOUSAND/UL (ref 0–1.22)
MONOCYTES NFR BLD AUTO: 8 % (ref 4–12)
NEUTS BAND NFR BLD MANUAL: 4 % (ref 0–8)
NEUTS SEG # BLD: 4.61 THOUSAND/UL (ref 1.81–6.82)
NEUTS SEG NFR BLD AUTO: 53 %
NONHDLC SERPL-MCNC: 130 MG/DL
NRBC BLD AUTO-RTO: 0 /100 WBCS
PLATELET # BLD AUTO: 297 THOUSANDS/UL (ref 149–390)
PLATELET BLD QL SMEAR: ADEQUATE
PMV BLD AUTO: 11.3 FL (ref 8.9–12.7)
POLYCHROMASIA BLD QL SMEAR: PRESENT
POTASSIUM SERPL-SCNC: 4.1 MMOL/L (ref 3.5–5.3)
PROT SERPL-MCNC: 7.2 G/DL (ref 6.4–8.4)
RBC # BLD AUTO: 4.98 MILLION/UL (ref 3.81–5.12)
SODIUM SERPL-SCNC: 135 MMOL/L (ref 135–147)
TIBC SERPL-MCNC: 337 UG/DL (ref 250–450)
TOTAL CELLS COUNTED SPEC: 100
TRIGL SERPL-MCNC: 180 MG/DL
TSH SERPL DL<=0.05 MIU/L-ACNC: 2.69 UIU/ML (ref 0.45–4.5)
UIBC SERPL-MCNC: 261 UG/DL (ref 155–355)
VARIANT LYMPHS # BLD AUTO: 4 % (ref 0–0)
VIT B12 SERPL-MCNC: 183 PG/ML (ref 180–914)
WBC # BLD AUTO: 8.08 THOUSAND/UL (ref 4.31–10.16)

## 2024-10-20 PROCEDURE — 84443 ASSAY THYROID STIM HORMONE: CPT

## 2024-10-20 PROCEDURE — 83550 IRON BINDING TEST: CPT

## 2024-10-20 PROCEDURE — 80053 COMPREHEN METABOLIC PANEL: CPT

## 2024-10-20 PROCEDURE — 80061 LIPID PANEL: CPT

## 2024-10-20 PROCEDURE — 82746 ASSAY OF FOLIC ACID SERUM: CPT

## 2024-10-20 PROCEDURE — 36415 COLL VENOUS BLD VENIPUNCTURE: CPT

## 2024-10-20 PROCEDURE — 85007 BL SMEAR W/DIFF WBC COUNT: CPT

## 2024-10-20 PROCEDURE — 82607 VITAMIN B-12: CPT

## 2024-10-20 PROCEDURE — 82728 ASSAY OF FERRITIN: CPT

## 2024-10-20 PROCEDURE — 83036 HEMOGLOBIN GLYCOSYLATED A1C: CPT

## 2024-10-20 PROCEDURE — 82306 VITAMIN D 25 HYDROXY: CPT

## 2024-10-20 PROCEDURE — 83540 ASSAY OF IRON: CPT

## 2024-10-23 ENCOUNTER — TELEPHONE (OUTPATIENT)
Dept: HEMATOLOGY ONCOLOGY | Facility: CLINIC | Age: 49
End: 2024-10-23

## 2024-10-23 ENCOUNTER — OFFICE VISIT (OUTPATIENT)
Dept: HEMATOLOGY ONCOLOGY | Facility: CLINIC | Age: 49
End: 2024-10-23
Payer: COMMERCIAL

## 2024-10-23 VITALS
SYSTOLIC BLOOD PRESSURE: 122 MMHG | RESPIRATION RATE: 16 BRPM | HEIGHT: 64 IN | HEART RATE: 88 BPM | OXYGEN SATURATION: 96 % | WEIGHT: 237 LBS | TEMPERATURE: 97.6 F | BODY MASS INDEX: 40.46 KG/M2 | DIASTOLIC BLOOD PRESSURE: 60 MMHG

## 2024-10-23 DIAGNOSIS — R79.0 LOW FERRITIN: ICD-10-CM

## 2024-10-23 DIAGNOSIS — E53.8 B12 DEFICIENCY: Primary | ICD-10-CM

## 2024-10-23 PROCEDURE — 99214 OFFICE O/P EST MOD 30 MIN: CPT | Performed by: NURSE PRACTITIONER

## 2024-10-23 RX ORDER — CYANOCOBALAMIN 1000 UG/ML
1000 INJECTION, SOLUTION INTRAMUSCULAR; SUBCUTANEOUS ONCE
OUTPATIENT
Start: 2024-11-06

## 2024-10-23 NOTE — PROGRESS NOTES
[facility]ATOLOGY [facility] [place]  [address] [id number]  [phone]  Hematology Ambulatory Follow-Up  Yaneth Waldron, 1975, [id number]  10/23/2024    Assessment/Plan:    1. B12 deficiency  2. Low ferritin  Patient is a 49-year-old female with a history of impaired fasting glucose, cervical intraepithelial neoplasia III follows with gynecology, elevated LDL, bipolar, initially referred for management of iron deficiency.  Patient completed a round of 3 iron infusions with vitamin B12 injections in July 2024.  Labs from 10/20/2024 show a normal CBC, peripheral smear shows 4% atypical lymphocytes, CMP within normal limits, iron saturation 23% with a ferritin level of 46, previously 5.  Vitamin B12 level was previously 153 currently at 183.  Folate 9.3.  Patient states she tolerated the iron and B12 without difficulty.  Given her B12 level remains very low we will make arrangements for vitamin B12 injections 1000 mcg x 4 weeks.  We will follow-up in 3 months and likely make arrangements for maintenance plan.  She is up-to-date on mammogram and colonoscopy.    - cyanocobalamin injection 1,000 mcg  - Vitamin B12; Future  - CBC and differential; Future  - Iron Panel (Includes Ferritin, Iron Sat%, Iron, and TIBC); Future    Patient verbalized understanding and is in agreement to the plan.   Patient knows to call the Hematology/Oncology office with any questions and concerns regarding the above.    Barrier(s) to care: None  The patient is able to self care.    Faina MELLO  Medical Oncology/Hematology  [facility]    Interval history: clinically stable    Review of Systems   Constitutional:  Positive for fatigue. Negative for activity change, appetite change, fever and unexpected weight change.   Respiratory:  Negative for cough and shortness of breath.    Cardiovascular:  Negative for chest pain and leg swelling.  "  Gastrointestinal:  Negative for abdominal pain, constipation, diarrhea and nausea.   Endocrine: Negative for cold intolerance and heat intolerance.   Musculoskeletal:  Negative for arthralgias and myalgias.   Skin: Negative.    Neurological:  Negative for dizziness, weakness and headaches.   Hematological:  Negative for adenopathy. Does not bruise/bleed easily.     Past Medical History:   Diagnosis Date    Anemia     Last assessed 2014    Anxiety     Bipolar 1 disorder (HCC)     Class 2 obesity due to excess calories without serious comorbidity with body mass index (BMI) of 37.0 to 37.9 in adult 2019    Depression     Diastasis recti     Last assessed 2017    Migraine     Last assessed 2014    Obesity (BMI 35.0-39.9 without comorbidity) 2018    PTSD (post-traumatic stress disorder)      Past Surgical History:   Procedure Laterality Date    BREAST EXCISIONAL BIOPSY Left     age 16    BREAST SURGERY      Lumpectomy    CERVICAL BIOPSY N/A 2022    Procedure: COLD KNIFE CONE AND VULVAR BIOPSIES;  Surgeon: Vera Kirkpatrick MD;  Location: [facility];  Service: Gynecology Oncology     SECTION      LAPAROSCOPY FOR ECTOPIC PREGNANCY      TUBAL LIGATION       Current Outpatient Medications:     triamcinolone (KENALOG) 0.025 % cream, Apply topically 2 (two) times a day, Disp: 30 g, Rfl: 5    Allergies   Allergen Reactions    Flagyl [Metronidazole] Hives    Pollen Extract Sneezing     Objective:  /60 (BP Location: Left arm, Patient Position: Sitting, Cuff Size: Large)   Pulse 88   Temp 97.6 °F (36.4 °C) (Temporal)   Resp 16   Ht 5' 4\" (1.626 m)   Wt 108 kg (237 lb)   SpO2 96%   BMI 40.68 kg/m²    Physical Exam  Vitals reviewed.   Constitutional:       Appearance: Normal appearance. She is well-developed.   HENT:      Head: Normocephalic and atraumatic.   Eyes:      Conjunctiva/sclera: Conjunctivae normal.      Pupils: Pupils are equal, round, and reactive to light.   Pulmonary: "      Effort: Pulmonary effort is normal. No respiratory distress.   Musculoskeletal:         General: Normal range of motion.      Cervical back: Normal range of motion.   Lymphadenopathy:      Cervical: No cervical adenopathy.   Skin:     General: Skin is dry.   Neurological:      Mental Status: She is alert and oriented to person, place, and time.   Psychiatric:         Behavior: Behavior normal.     Result Review  Labs:  Lab Results   Component Value Date    WBC 8.08 10/20/2024    HGB 13.8 10/20/2024    HCT 43.5 10/20/2024    MCV 87 10/20/2024     10/20/2024     Lab Results   Component Value Date     11/19/2014    SODIUM 135 10/20/2024    K 4.1 10/20/2024     10/20/2024    CO2 26 10/20/2024    ANIONGAP 6 11/19/2014    AGAP 8 10/20/2024    BUN 11 10/20/2024    CREATININE 0.69 10/20/2024    GLUC 85 01/24/2017    GLUF 101 (H) 10/20/2024    CALCIUM 8.9 10/20/2024    AST 14 10/20/2024    ALT 14 10/20/2024    ALKPHOS 60 10/20/2024    PROT 8.8 (H) 11/19/2014    TP 7.2 10/20/2024    BILITOT 0.31 11/19/2014    TBILI 0.43 10/20/2024    EGFR 102 10/20/2024     Imaging:  No results found.    Please note:  This report has been generated by a voice recognition software system. Therefore there may be syntax, spelling, and/or grammatical errors. Please call if you have any questions.

## 2024-10-28 DIAGNOSIS — E55.9 VITAMIN D DEFICIENCY: Primary | ICD-10-CM

## 2024-10-28 RX ORDER — ERGOCALCIFEROL 1.25 MG/1
50000 CAPSULE, LIQUID FILLED ORAL WEEKLY
Qty: 12 CAPSULE | Refills: 0 | Status: SHIPPED | OUTPATIENT
Start: 2024-10-28

## 2024-10-29 ENCOUNTER — TELEPHONE (OUTPATIENT)
Dept: FAMILY MEDICINE CLINIC | Facility: CLINIC | Age: 49
End: 2024-10-29

## 2024-10-29 NOTE — TELEPHONE ENCOUNTER
----- Message from RIAZ Yi sent at 10/28/2024 10:51 PM EDT -----  Please call pt with lab results:  Vitamin D is low at 12.3- I sent Vitamin D 50,000 UT to her pharmacy (one tab weekly x 12 weeks, then transition to OTC Vitamin D 5,000IU daily)  Fasting blood sugar is mildly elevated at 101, A1C is normal at 5.5-- try to follow a low carb/ low sugar diet, work on regular exercise  Triglycerides are elevated at 180 (normal is < 150)- recommend to follow a low fat diet  Thyroid function is normal  Vitamin B12 is normal but on the lower end of normal at 183-- would recommend Vitamin B12 injections monthly for about 6 months   Iron level looks okay

## 2024-11-07 ENCOUNTER — HOSPITAL ENCOUNTER (OUTPATIENT)
Dept: INFUSION CENTER | Facility: HOSPITAL | Age: 49
Discharge: HOME | End: 2024-11-07
Attending: INTERNAL MEDICINE
Payer: COMMERCIAL

## 2024-11-07 DIAGNOSIS — E53.8 B12 DEFICIENCY: Primary | ICD-10-CM

## 2024-11-07 PROCEDURE — 96372 THER/PROPH/DIAG INJ SC/IM: CPT

## 2024-11-07 RX ORDER — CYANOCOBALAMIN 1000 UG/ML
1000 INJECTION, SOLUTION INTRAMUSCULAR; SUBCUTANEOUS ONCE
Status: COMPLETED | OUTPATIENT
Start: 2024-11-07 | End: 2024-11-07

## 2024-11-07 RX ORDER — CYANOCOBALAMIN 1000 UG/ML
1000 INJECTION, SOLUTION INTRAMUSCULAR; SUBCUTANEOUS ONCE
Status: CANCELLED | OUTPATIENT
Start: 2024-11-14

## 2024-11-07 RX ADMIN — CYANOCOBALAMIN 1000 MCG: 1000 INJECTION, SOLUTION INTRAMUSCULAR at 12:29

## 2024-11-12 ENCOUNTER — HOSPITAL ENCOUNTER (OUTPATIENT)
Dept: SLEEP CENTER | Facility: CLINIC | Age: 49
Discharge: HOME | End: 2024-11-12
Payer: COMMERCIAL

## 2024-11-12 PROCEDURE — G0399 HOME SLEEP TEST/TYPE 3 PORTA: HCPCS

## 2024-11-12 NOTE — PROGRESS NOTES
Home Sleep Study Documentation    HOME STUDY DEVICE: Noxturnal no                                           Alma Delia G3 yes device # 6      Pre-Sleep Home Study:    Set-up and instructions performed by: Priscila    Technician performed demonstration for Patient: yes    Return demonstration performed by Patient: yes    Written instructions provided to Patient: yes    Patient signed consent form: yes        Post-Sleep Home Study:    Additional comments by Patient: None    Home Sleep Study Failed:no:    Failure reason: N/A    Reported or Detected: N/A    Scored by: TATI Ghotra

## 2024-11-14 ENCOUNTER — HOSPITAL ENCOUNTER (OUTPATIENT)
Dept: INFUSION CENTER | Facility: HOSPITAL | Age: 49
Discharge: HOME | End: 2024-11-14
Attending: INTERNAL MEDICINE
Payer: COMMERCIAL

## 2024-11-14 DIAGNOSIS — E53.8 B12 DEFICIENCY: Primary | ICD-10-CM

## 2024-11-14 PROCEDURE — 96372 THER/PROPH/DIAG INJ SC/IM: CPT

## 2024-11-14 RX ORDER — CYANOCOBALAMIN 1000 UG/ML
1000 INJECTION, SOLUTION INTRAMUSCULAR; SUBCUTANEOUS ONCE
Status: COMPLETED | OUTPATIENT
Start: 2024-11-14 | End: 2024-11-14

## 2024-11-14 RX ORDER — CYANOCOBALAMIN 1000 UG/ML
1000 INJECTION, SOLUTION INTRAMUSCULAR; SUBCUTANEOUS ONCE
OUTPATIENT
Start: 2024-11-21

## 2024-11-14 RX ADMIN — CYANOCOBALAMIN 1000 MCG: 1000 INJECTION, SOLUTION INTRAMUSCULAR; SUBCUTANEOUS at 08:08

## 2024-11-14 NOTE — PROGRESS NOTES
Yaneth Waldron  tolerated treatment well with no complications.      Yaneth Waldron is aware of future appt on 11/21/24 at 1130.     AVS declined by Yaneth Waldron.

## 2024-11-15 PROBLEM — G47.33 OSA (OBSTRUCTIVE SLEEP APNEA): Status: ACTIVE | Noted: 2024-11-15

## 2024-11-17 PROCEDURE — 95806 SLEEP STUDY UNATT&RESP EFFT: CPT | Performed by: INTERNAL MEDICINE

## 2024-11-20 ENCOUNTER — RESULTS FOLLOW-UP (OUTPATIENT)
Dept: FAMILY MEDICINE CLINIC | Facility: CLINIC | Age: 49
End: 2024-11-20

## 2024-11-20 DIAGNOSIS — G47.33 OSA (OBSTRUCTIVE SLEEP APNEA): Primary | ICD-10-CM

## 2024-11-20 NOTE — TELEPHONE ENCOUNTER
Spoke to patient aware of results and referral. They called to her schedule for appt. Patient states that she is only willing to go to to the [place] office so she only can be seen in June of 2025 for new patient ---- Message from RIAZ Yi sent at 11/20/2024  1:29 PM EST -----  Please let pt know her sleep study did reveal obstructive sleep apnea. Referral to follow up with Sleep Medicine placed to discuss CPAP

## 2024-11-25 DIAGNOSIS — E53.8 B12 DEFICIENCY: Primary | ICD-10-CM

## 2024-11-25 RX ORDER — CYANOCOBALAMIN 1000 UG/ML
1000 INJECTION, SOLUTION INTRAMUSCULAR; SUBCUTANEOUS ONCE
Status: CANCELLED | OUTPATIENT
Start: 2024-11-27

## 2024-11-27 ENCOUNTER — HOSPITAL ENCOUNTER (OUTPATIENT)
Dept: INFUSION CENTER | Facility: HOSPITAL | Age: 49
Discharge: HOME | End: 2024-11-27
Attending: INTERNAL MEDICINE
Payer: COMMERCIAL

## 2024-11-27 DIAGNOSIS — E53.8 B12 DEFICIENCY: Primary | ICD-10-CM

## 2024-11-27 PROCEDURE — 96372 THER/PROPH/DIAG INJ SC/IM: CPT

## 2024-11-27 RX ORDER — CYANOCOBALAMIN 1000 UG/ML
1000 INJECTION, SOLUTION INTRAMUSCULAR; SUBCUTANEOUS ONCE
Status: COMPLETED | OUTPATIENT
Start: 2024-11-27 | End: 2024-11-27

## 2024-11-27 RX ORDER — CYANOCOBALAMIN 1000 UG/ML
1000 INJECTION, SOLUTION INTRAMUSCULAR; SUBCUTANEOUS ONCE
Status: CANCELLED | OUTPATIENT
Start: 2024-12-05

## 2024-11-27 RX ADMIN — CYANOCOBALAMIN 1000 MCG: 1000 INJECTION, SOLUTION INTRAMUSCULAR at 11:05

## 2024-12-05 ENCOUNTER — HOSPITAL ENCOUNTER (OUTPATIENT)
Dept: INFUSION CENTER | Facility: HOSPITAL | Age: 49
Discharge: HOME | End: 2024-12-05
Attending: INTERNAL MEDICINE
Payer: COMMERCIAL

## 2024-12-05 DIAGNOSIS — E53.8 B12 DEFICIENCY: Primary | ICD-10-CM

## 2024-12-05 PROCEDURE — 96372 THER/PROPH/DIAG INJ SC/IM: CPT

## 2024-12-05 RX ORDER — CYANOCOBALAMIN 1000 UG/ML
1000 INJECTION, SOLUTION INTRAMUSCULAR; SUBCUTANEOUS ONCE
Status: CANCELLED | OUTPATIENT
Start: 2024-12-11

## 2024-12-05 RX ORDER — CYANOCOBALAMIN 1000 UG/ML
1000 INJECTION, SOLUTION INTRAMUSCULAR; SUBCUTANEOUS ONCE
Status: COMPLETED | OUTPATIENT
Start: 2024-12-05 | End: 2024-12-05

## 2024-12-05 RX ADMIN — CYANOCOBALAMIN 1000 MCG: 1000 INJECTION, SOLUTION INTRAMUSCULAR; SUBCUTANEOUS at 11:53

## 2024-12-05 NOTE — PROGRESS NOTES
Yaneth Waldron  tolerated B12 injection in L deltoid well with no complications.      Yaneth Waldron has no future appointments with the infusion center at this time - therapy complete.    AVS printed and given to Yaneth Waldron:  No (Declined by Yaneth Waldron)     Left unit in stable condition.

## 2024-12-05 NOTE — PLAN OF CARE
Problem: HEMATOLOGIC - ADULT  Goal: Maintains hematologic stability  Description: INTERVENTIONS  - Monitor labs  - Administer B12 as ordered  Outcome: Progressing

## 2024-12-06 NOTE — ASSESSMENT & PLAN NOTE
Stable and managed by Gynecology, sees GYN every 6 months and is up to date with this   She is s/p cold knife cone and vulvar bx 2/2022 (margins negative)   vital signs normal or at patient baseline, no recurrent hypotension not met, still orthostatic +, MD Dr Palmer aware and IV bolus started  -Nephrology consult  following and s/o  -CC consult to return to [facility] following

## 2024-12-17 ENCOUNTER — ANNUAL EXAM (OUTPATIENT)
Dept: OBGYN CLINIC | Facility: CLINIC | Age: 49
End: 2024-12-17
Payer: COMMERCIAL

## 2024-12-17 VITALS — WEIGHT: 241.8 LBS | BODY MASS INDEX: 41.5 KG/M2 | DIASTOLIC BLOOD PRESSURE: 80 MMHG | SYSTOLIC BLOOD PRESSURE: 118 MMHG

## 2024-12-17 DIAGNOSIS — L90.0 LICHEN SCLEROSUS: ICD-10-CM

## 2024-12-17 DIAGNOSIS — Z01.419 PAP SMEAR, AS PART OF ROUTINE GYNECOLOGICAL EXAMINATION: ICD-10-CM

## 2024-12-17 DIAGNOSIS — Z12.31 ENCOUNTER FOR SCREENING MAMMOGRAM FOR BREAST CANCER: ICD-10-CM

## 2024-12-17 DIAGNOSIS — Z01.419 ENCOUNTER FOR GYNECOLOGICAL EXAMINATION WITHOUT ABNORMAL FINDING: Primary | ICD-10-CM

## 2024-12-17 PROCEDURE — G0101 CA SCREEN;PELVIC/BREAST EXAM: HCPCS | Performed by: OBSTETRICS & GYNECOLOGY

## 2024-12-17 NOTE — PROGRESS NOTES
Assessment/Plan:    No problem-specific Assessment & Plan notes found for this encounter.       Diagnoses and all orders for this visit:    Encounter for gynecological examination without abnormal finding    Pap smear, as part of routine gynecological examination    Encounter for screening mammogram for breast cancer  -     Mammo screening bilateral w 3d and cad; Future    Lichen sclerosus          Normal gynecological physical examination.  Self-breast examination stressed.  Mammogram ordered.  Discussed regular exercise, healthy diet, importance of vitamin D and calcium supplements.  Discussed importance of sun block use during periods of prolonged sun exposure.  Patient will be seen in 1 year for routine gynecologic and medical examination.  Patient will call office for any problems, concerns, or issues which may arise during the interim.     Subjective:          HPI    Patient ID: Yaneth Waldron is a 49 y.o. female who presents today for her annual gynecologic and medical examination    Menstrual status: Patient reports that her menstrual cycles still occur on a fairly regular basis each month but have become lighter for her.  She denies any significant pelvic or abdominal pain.  She denies any bleeding between cycles as well.    Vasomotor symptoms: Reports occasional hot flashes at night.    Patient reports normal appetite    Patient reports normal bowel and bladder habits    Patient denies any significant pelvic or abdominal pain    Patient denies any headaches, chest pain, shortness of breath fever shakes or chills    Patient denies any COVID 19 symptoms including cough or loss of taste or smell    COVID vaccine status: Aware COVID vaccination protocols    Medical problems: No significant medical problems    Colonoscopy status: Had recent colonoscopy with polyps and will be followed in 5 years.    Mammogram status: Patient does regular self breast exams and is up-to-date with screening mammography.   Appropriate arrangements for her annual screening mammogram were placed into the EMR system at today's visit.    The following portions of the patient's history were reviewed and updated as appropriate: allergies, current medications, past family history, past medical history, past social history, past surgical history and problem list.    Review of Systems   Constitutional: Negative.  Negative for appetite change, diaphoresis, fatigue, fever and unexpected weight change.   HENT: Negative.     Eyes: Negative.    Respiratory: Negative.     Cardiovascular: Negative.    Gastrointestinal: Negative.  Negative for abdominal pain, blood in stool, constipation, diarrhea, nausea and vomiting.   Endocrine: Negative.  Negative for cold intolerance and heat intolerance.   Genitourinary: Negative.  Negative for dysuria, frequency, hematuria, urgency, vaginal bleeding, vaginal discharge and vaginal pain.   Musculoskeletal: Negative.    Skin: Negative.    Allergic/Immunologic: Negative.    Neurological: Negative.    Hematological: Negative.  Negative for adenopathy.   Psychiatric/Behavioral: Negative.           Objective:      /80   Wt 110 kg (241 lb 12.8 oz)   BMI 41.50 kg/m²          Physical Exam  Constitutional:       General: She is not in acute distress.     Appearance: Normal appearance. She is well-developed. She is not diaphoretic.   HENT:      Head: Normocephalic and atraumatic.   Eyes:      Pupils: Pupils are equal, round, and reactive to light.   Cardiovascular:      Rate and Rhythm: Normal rate and regular rhythm.      Heart sounds: Normal heart sounds. No murmur heard.     No friction rub. No gallop.   Pulmonary:      Effort: Pulmonary effort is normal.      Breath sounds: Normal breath sounds.   Chest:   Breasts:     Breasts are symmetrical.      Right: No inverted nipple, mass, nipple discharge, skin change or tenderness.      Left: No inverted nipple, mass, nipple discharge, skin change or tenderness.    Abdominal:      General: Bowel sounds are normal.      Palpations: Abdomen is soft.   Genitourinary:     General: Normal vulva.      Exam position: Supine.      Labia:         Right: No rash or lesion (Probable lichen sclerosis).         Left: No rash or lesion (Probable lichen sclerosis).       Urethra: No urethral swelling or urethral lesion.      Vagina: Normal. No vaginal discharge, erythema, tenderness or bleeding.      Cervix: No discharge or friability.      Uterus: Not enlarged and not tender.       Adnexa:         Right: No mass, tenderness or fullness.          Left: No mass, tenderness or fullness.        Rectum: Normal. Guaiac result negative.          Comments: External genitalia show changes consistent with extensive lichen sclerosis.  Will schedule for follow-up biopsy after the first of the year  Musculoskeletal:         General: Normal range of motion.      Cervical back: Normal range of motion and neck supple.   Lymphadenopathy:      Cervical: No cervical adenopathy.      Upper Body:      Right upper body: No supraclavicular adenopathy.      Left upper body: No supraclavicular adenopathy.   Skin:     General: Skin is warm and dry.      Findings: No rash.   Neurological:      General: No focal deficit present.      Mental Status: She is alert and oriented to person, place, and time.   Psychiatric:         Mood and Affect: Mood normal.         Speech: Speech normal.         Behavior: Behavior normal.         Thought Content: Thought content normal.         Judgment: Judgment normal.

## 2024-12-17 NOTE — PATIENT INSTRUCTIONS
Normal gynecological physical examination.  Self-breast examination stressed.  Mammogram ordered.  Discussed regular exercise, healthy diet, importance of vitamin D and calcium supplements.  Discussed importance of sun block use during periods of prolonged sun exposure.  Patient will be seen in 1 year for routine gynecologic and medical examination.  Patient will call office for any problems, concerns, or issues which may arise during the interim.  She will be seen in January for vulvar biopsy for possible lichen sclerosis

## 2024-12-19 LAB
CLINICAL INFO: NORMAL
CYTO CVX: NORMAL
CYTOLOGY CMNT CVX/VAG CYTO-IMP: NORMAL
DATE PREVIOUS BX: NORMAL
HPV E6+E7 MRNA CVX QL NAA+PROBE: NOT DETECTED
LMP START DATE: NORMAL
SL AMB PREV. PAP:: NORMAL
SPECIMEN SOURCE CVX/VAG CYTO: NORMAL

## 2025-01-17 ENCOUNTER — PROCEDURE VISIT (OUTPATIENT)
Dept: OBGYN CLINIC | Facility: CLINIC | Age: 50
End: 2025-01-17
Payer: COMMERCIAL

## 2025-01-17 VITALS — BODY MASS INDEX: 41.71 KG/M2 | SYSTOLIC BLOOD PRESSURE: 118 MMHG | WEIGHT: 243 LBS | DIASTOLIC BLOOD PRESSURE: 66 MMHG

## 2025-01-17 DIAGNOSIS — L90.0 LICHEN SCLEROSUS: Primary | ICD-10-CM

## 2025-01-17 PROCEDURE — 56605 BIOPSY OF VULVA/PERINEUM: CPT | Performed by: OBSTETRICS & GYNECOLOGY

## 2025-01-17 NOTE — PATIENT INSTRUCTIONS
Topic: Vulvar biopsy left side     Procedure: Punch biopsy of the left vulva    Excellent hemostasis noted     Instructions and restrictions given     All questions answered     Further treatment and follow-up planning will be based upon final pathology results     Patient to call for any problems, questions, issues or concerns which may arise for her

## 2025-01-17 NOTE — PROGRESS NOTES
Biopsy    Date/Time: 1/17/2025 11:00 AM    Performed by: Cruz Hernandez MD  Authorized by: Cruz Hernandez MD  Universal Protocol:  procedure performed by consultantConsent: Verbal consent obtained.  Risks and benefits: risks, benefits and alternatives were discussed  Consent given by: patient  Patient understanding: patient states understanding of the procedure being performed  Patient consent: the patient's understanding of the procedure matches consent given  Procedure consent: procedure consent matches procedure scheduled  Relevant documents: relevant documents present and verified  Test results: test results available and properly labeled  Site marked: the operative site was not marked  Radiology Images displayed and confirmed. If images not available, report reviewed: imaging studies not available  Required items: required blood products, implants, devices, and special equipment available  Patient identity confirmed: verbally with patient    Procedure Details - Lesion Biopsy:     Body area:  Anogenital    Anogenital location:  Vulva    Vaginal Lesion: No      Biopsy method: punch biopsy      Biopsy tissue type: skin    Initial size (mm):  10    Final defect size (mm):  10    Malignancy: benign lesion       Topic: Hide vulvar biopsy rule out lichen sclerosis     Procedure: Left vulvar punch biopsy    Excellent hemostasis noted     Instructions and restrictions given     All questions answered     Further treatment and follow-up planning will be based upon final pathology results     Patient to call for any problems, questions, issues or concerns which may arise for her

## 2025-01-21 LAB
CLINICAL INFO: NORMAL
PATH REPORT.FINAL DX SPEC: NORMAL
SPECIMEN SOURCE: NORMAL

## 2025-01-23 ENCOUNTER — RESULTS FOLLOW-UP (OUTPATIENT)
Dept: OBGYN CLINIC | Facility: CLINIC | Age: 50
End: 2025-01-23

## 2025-01-23 DIAGNOSIS — L90.0 LICHEN SCLEROSUS: ICD-10-CM

## 2025-01-23 DIAGNOSIS — E55.9 VITAMIN D DEFICIENCY: ICD-10-CM

## 2025-01-23 RX ORDER — ERGOCALCIFEROL 1.25 MG/1
50000 CAPSULE, LIQUID FILLED ORAL WEEKLY
Qty: 12 CAPSULE | Refills: 0 | OUTPATIENT
Start: 2025-01-23

## 2025-01-23 RX ORDER — TRIAMCINOLONE ACETONIDE 0.25 MG/G
CREAM TOPICAL 2 TIMES DAILY
Qty: 30 G | Refills: 1 | Status: SHIPPED | OUTPATIENT
Start: 2025-01-23

## 2025-02-24 ENCOUNTER — TELEPHONE (OUTPATIENT)
Dept: HEMATOLOGY ONCOLOGY | Facility: CLINIC | Age: 50
End: 2025-02-24

## 2025-02-24 NOTE — TELEPHONE ENCOUNTER
Message left on voicemail as a reminder to complete labs ordered [facility]

## 2025-02-25 ENCOUNTER — APPOINTMENT (OUTPATIENT)
Dept: LAB | Facility: CLINIC | Age: 50
End: 2025-02-25
Payer: MEDICARE

## 2025-02-25 DIAGNOSIS — E53.8 B12 DEFICIENCY: ICD-10-CM

## 2025-02-25 DIAGNOSIS — R79.0 LOW FERRITIN: ICD-10-CM

## 2025-02-25 LAB
BASOPHILS # BLD AUTO: 0.05 THOUSANDS/ÂΜL (ref 0–0.1)
BASOPHILS NFR BLD AUTO: 1 % (ref 0–1)
EOSINOPHIL # BLD AUTO: 0.18 THOUSAND/ÂΜL (ref 0–0.61)
EOSINOPHIL NFR BLD AUTO: 2 % (ref 0–6)
ERYTHROCYTE [DISTWIDTH] IN BLOOD BY AUTOMATED COUNT: 13.2 % (ref 11.6–15.1)
FERRITIN SERPL-MCNC: 24 NG/ML (ref 11–307)
HCT VFR BLD AUTO: 42.6 % (ref 34.8–46.1)
HGB BLD-MCNC: 13.4 G/DL (ref 11.5–15.4)
IMM GRANULOCYTES # BLD AUTO: 0.02 THOUSAND/UL (ref 0–0.2)
IMM GRANULOCYTES NFR BLD AUTO: 0 % (ref 0–2)
IRON SATN MFR SERPL: 13 % (ref 15–50)
IRON SERPL-MCNC: 46 UG/DL (ref 50–212)
LYMPHOCYTES # BLD AUTO: 2.41 THOUSANDS/ÂΜL (ref 0.6–4.47)
LYMPHOCYTES NFR BLD AUTO: 27 % (ref 14–44)
MCH RBC QN AUTO: 28.1 PG (ref 26.8–34.3)
MCHC RBC AUTO-ENTMCNC: 31.5 G/DL (ref 31.4–37.4)
MCV RBC AUTO: 89 FL (ref 82–98)
MONOCYTES # BLD AUTO: 0.45 THOUSAND/ÂΜL (ref 0.17–1.22)
MONOCYTES NFR BLD AUTO: 5 % (ref 4–12)
NEUTROPHILS # BLD AUTO: 5.7 THOUSANDS/ÂΜL (ref 1.85–7.62)
NEUTS SEG NFR BLD AUTO: 65 % (ref 43–75)
NRBC BLD AUTO-RTO: 0 /100 WBCS
PLATELET # BLD AUTO: 351 THOUSANDS/UL (ref 149–390)
PMV BLD AUTO: 11.6 FL (ref 8.9–12.7)
RBC # BLD AUTO: 4.77 MILLION/UL (ref 3.81–5.12)
TIBC SERPL-MCNC: 354.2 UG/DL (ref 250–450)
TRANSFERRIN SERPL-MCNC: 253 MG/DL (ref 203–362)
UIBC SERPL-MCNC: 308 UG/DL (ref 155–355)
VIT B12 SERPL-MCNC: 206 PG/ML (ref 180–914)
WBC # BLD AUTO: 8.81 THOUSAND/UL (ref 4.31–10.16)

## 2025-02-25 PROCEDURE — 82607 VITAMIN B-12: CPT

## 2025-02-25 PROCEDURE — 85025 COMPLETE CBC W/AUTO DIFF WBC: CPT

## 2025-02-25 PROCEDURE — 83540 ASSAY OF IRON: CPT

## 2025-02-25 PROCEDURE — 36415 COLL VENOUS BLD VENIPUNCTURE: CPT

## 2025-02-25 PROCEDURE — 83550 IRON BINDING TEST: CPT

## 2025-02-25 PROCEDURE — 82728 ASSAY OF FERRITIN: CPT

## 2025-02-26 ENCOUNTER — OFFICE VISIT (OUTPATIENT)
Dept: HEMATOLOGY ONCOLOGY | Facility: CLINIC | Age: 50
End: 2025-02-26
Payer: MEDICARE

## 2025-02-26 ENCOUNTER — TELEPHONE (OUTPATIENT)
Dept: HEMATOLOGY ONCOLOGY | Facility: CLINIC | Age: 50
End: 2025-02-26

## 2025-02-26 VITALS
SYSTOLIC BLOOD PRESSURE: 120 MMHG | BODY MASS INDEX: 41.02 KG/M2 | HEART RATE: 108 BPM | DIASTOLIC BLOOD PRESSURE: 72 MMHG | TEMPERATURE: 98.1 F | WEIGHT: 239 LBS | OXYGEN SATURATION: 97 %

## 2025-02-26 DIAGNOSIS — D50.9 IRON DEFICIENCY ANEMIA, UNSPECIFIED IRON DEFICIENCY ANEMIA TYPE: ICD-10-CM

## 2025-02-26 DIAGNOSIS — E53.8 B12 DEFICIENCY: Primary | ICD-10-CM

## 2025-02-26 PROCEDURE — 99214 OFFICE O/P EST MOD 30 MIN: CPT | Performed by: NURSE PRACTITIONER

## 2025-02-26 RX ORDER — SODIUM CHLORIDE 9 MG/ML
20 INJECTION, SOLUTION INTRAVENOUS ONCE
Status: CANCELLED | OUTPATIENT
Start: 2025-03-12

## 2025-02-26 RX ORDER — CYANOCOBALAMIN 1000 UG/ML
1000 INJECTION, SOLUTION INTRAMUSCULAR; SUBCUTANEOUS ONCE
Status: CANCELLED | OUTPATIENT
Start: 2025-03-12 | End: 2025-03-12

## 2025-02-26 NOTE — ASSESSMENT & PLAN NOTE
Iron saturation 13% with a ferritin level of 24.  Venofer infusions were completed in July 2024.  We we will make arrangements for another round of infusions.  I reviewed indications and adverse reactions including shortness of breath, chest heaviness, headache, itching, Anaphylaxis/allergic reaction, arthralgias/myalgias, nausea; agrees to proceed with plan. Patient is advised to contact our office if they has any of the symptoms.  Patient is up-to-date on colonoscopy and mammogram.  Orders:    CBC; Future    Iron Panel (Includes Ferritin, Iron Sat%, Iron, and TIBC); Future

## 2025-02-26 NOTE — PROGRESS NOTES
Name: Yaneth Waldron      : 1975      MRN: [id number]  Encounter Provider: RIAZ Naik  Encounter Date: 2025   Encounter department: [facility] HEMATOLOGY ON[facility] [place]  :  Assessment & Plan  B12 deficiency   Patient completed B12 injections on 2024.  Labs from 2025 show a normal CBC and differential, ferritin 24, iron saturation 13%, vitamin B12 level 206.  We will make arrangements for Venofer 300 mg IV and vitamin B12 injections 1000 mcg IM weekly x 4.  I also advised her to start taking a natures made B12 gummy 3000 mcg once daily for 2 months then decrease to 1 gummy daily.  She also could consider a prenatal softgel vitamin that has B12 iron and folate.  Orders:    CBC; Future    Vitamin B12; Future    Iron deficiency anemia, unspecified iron deficiency anemia type   Iron saturation 13% with a ferritin level of 24.  Venofer infusions were completed in 2024.  We we will make arrangements for another round of infusions.  I reviewed indications and adverse reactions including shortness of breath, chest heaviness, headache, itching, Anaphylaxis/allergic reaction, arthralgias/myalgias, nausea; agrees to proceed with plan. Patient is advised to contact our office if they has any of the symptoms.  Patient is up-to-date on colonoscopy and mammogram.  Orders:    CBC; Future    Iron Panel (Includes Ferritin, Iron Sat%, Iron, and TIBC); Future    Return in about 19 weeks (around 2025) for Office Visit, Infusion - See Treatment Plan.    History of Present Illness   Chief Complaint   Patient presents with    Follow-up     Pertinent Medical History     25: Patient is a 49-year-old female with a history of impaired fasting glucose, cervical intraepithelial neoplasia III follows with gynecology, elevated LDL, bipolar, and iron deficiency. Patient completed a round of 3 iron infusions with vitamin B12 injections in 2024.  Repeat vitamin B12 in October was  183.  Arrangements were made for B12 injections 1000 mcg IM x 4.     Review of Systems   Constitutional:  Negative for activity change, appetite change, fatigue, fever and unexpected weight change.   Respiratory:  Negative for cough and shortness of breath.    Cardiovascular:  Negative for chest pain and leg swelling.   Gastrointestinal:  Negative for abdominal pain, constipation, diarrhea and nausea.   Endocrine: Negative for cold intolerance and heat intolerance.   Musculoskeletal:  Negative for arthralgias and myalgias.   Skin: Negative.    Neurological:  Negative for dizziness, weakness and headaches.   Hematological:  Negative for adenopathy. Does not bruise/bleed easily.       Objective   /72 (BP Location: Left arm, Patient Position: Sitting, Cuff Size: Large)   Pulse (!) 108   Temp 98.1 °F (36.7 °C) (Temporal)   Wt 108 kg (239 lb)   SpO2 97%   BMI 41.02 kg/m²     Physical Exam  Vitals reviewed.   Constitutional:       Appearance: Normal appearance. She is well-developed.   HENT:      Head: Normocephalic and atraumatic.   Eyes:      Conjunctiva/sclera: Conjunctivae normal.      Pupils: Pupils are equal, round, and reactive to light.   Pulmonary:      Effort: Pulmonary effort is normal. No respiratory distress.   Musculoskeletal:         General: Normal range of motion.      Cervical back: Normal range of motion.   Lymphadenopathy:      Cervical: No cervical adenopathy.   Skin:     General: Skin is dry.   Neurological:      Mental Status: She is alert and oriented to person, place, and time.   Psychiatric:         Behavior: Behavior normal.     Labs: I have reviewed the following labs:  Results for orders placed or performed in visit on 02/25/25   CBC and differential   Result Value Ref Range    WBC 8.81 4.31 - 10.16 Thousand/uL    RBC 4.77 3.81 - 5.12 Million/uL    Hemoglobin 13.4 11.5 - 15.4 g/dL    Hematocrit 42.6 34.8 - 46.1 %    MCV 89 82 - 98 fL    MCH 28.1 26.8 - 34.3 pg    MCHC 31.5 31.4 -  37.4 g/dL    RDW 13.2 11.6 - 15.1 %    MPV 11.6 8.9 - 12.7 fL    Platelets 351 149 - 390 Thousands/uL    nRBC 0 /100 WBCs    Segmented % 65 43 - 75 %    Immature Grans % 0 0 - 2 %    Lymphocytes % 27 14 - 44 %    Monocytes % 5 4 - 12 %    Eosinophils Relative 2 0 - 6 %    Basophils Relative 1 0 - 1 %    Absolute Neutrophils 5.70 1.85 - 7.62 Thousands/µL    Absolute Immature Grans 0.02 0.00 - 0.20 Thousand/uL    Absolute Lymphocytes 2.41 0.60 - 4.47 Thousands/µL    Absolute Monocytes 0.45 0.17 - 1.22 Thousand/µL    Eosinophils Absolute 0.18 0.00 - 0.61 Thousand/µL    Basophils Absolute 0.05 0.00 - 0.10 Thousands/µL   Vitamin B12   Result Value Ref Range    Vitamin B-12 206 180 - 914 pg/mL   TIBC Panel (incl. Iron, TIBC, % Iron Saturation)   Result Value Ref Range    Iron Saturation 13 (L) 15 - 50 %    TIBC 354.2 250 - 450 ug/dL    Iron 46 (L) 50 - 212 ug/dL    Transferrin 253 203 - 362 mg/dL    UIBC 308 155 - 355 ug/dL   Result Value Ref Range    Ferritin 24 11 - 307 ng/mL

## 2025-02-26 NOTE — ASSESSMENT & PLAN NOTE
Patient completed B12 injections on 12/5/2024.  Labs from 2/25/2025 show a normal CBC and differential, ferritin 24, iron saturation 13%, vitamin B12 level 206.  We will make arrangements for Venofer 300 mg IV and vitamin B12 injections 1000 mcg IM weekly x 4.  I also advised her to start taking a natures made B12 gummy 3000 mcg once daily for 2 months then decrease to 1 gummy daily.  She also could consider a prenatal softgel vitamin that has B12 iron and folate.  Orders:    CBC; Future    Vitamin B12; Future

## 2025-02-26 NOTE — PATIENT INSTRUCTIONS
Please look for a gummy vitamin B12 and iron or a prenatal multivitamin with iron, b12 and folate. Natures made has a b12 3000 mcg gummy, take 2 gummies daily for 2 months then decrease to 1 gummy daily. The iron should be at least 18 mg once daily.

## 2025-03-10 ENCOUNTER — HOSPITAL ENCOUNTER (OUTPATIENT)
Dept: INFUSION CENTER | Facility: HOSPITAL | Age: 50
Discharge: HOME | End: 2025-03-10
Attending: INTERNAL MEDICINE
Payer: MEDICARE

## 2025-03-10 VITALS
RESPIRATION RATE: 16 BRPM | DIASTOLIC BLOOD PRESSURE: 81 MMHG | TEMPERATURE: 97.4 F | HEART RATE: 76 BPM | SYSTOLIC BLOOD PRESSURE: 124 MMHG

## 2025-03-10 DIAGNOSIS — D50.9 IRON DEFICIENCY ANEMIA, UNSPECIFIED IRON DEFICIENCY ANEMIA TYPE: ICD-10-CM

## 2025-03-10 DIAGNOSIS — E53.8 B12 DEFICIENCY: Primary | ICD-10-CM

## 2025-03-10 PROCEDURE — 96366 THER/PROPH/DIAG IV INF ADDON: CPT

## 2025-03-10 PROCEDURE — 96372 THER/PROPH/DIAG INJ SC/IM: CPT

## 2025-03-10 PROCEDURE — 96365 THER/PROPH/DIAG IV INF INIT: CPT

## 2025-03-10 RX ORDER — SODIUM CHLORIDE 9 MG/ML
20 INJECTION, SOLUTION INTRAVENOUS ONCE
Status: CANCELLED | OUTPATIENT
Start: 2025-03-17

## 2025-03-10 RX ORDER — CYANOCOBALAMIN 1000 UG/ML
1000 INJECTION, SOLUTION INTRAMUSCULAR; SUBCUTANEOUS ONCE
Status: COMPLETED | OUTPATIENT
Start: 2025-03-10 | End: 2025-03-10

## 2025-03-10 RX ORDER — CYANOCOBALAMIN 1000 UG/ML
1000 INJECTION, SOLUTION INTRAMUSCULAR; SUBCUTANEOUS ONCE
Status: CANCELLED | OUTPATIENT
Start: 2025-03-17 | End: 2025-03-17

## 2025-03-10 RX ORDER — SODIUM CHLORIDE 9 MG/ML
20 INJECTION, SOLUTION INTRAVENOUS ONCE
Status: COMPLETED | OUTPATIENT
Start: 2025-03-10 | End: 2025-03-10

## 2025-03-10 RX ADMIN — CYANOCOBALAMIN 1000 MCG: 1000 INJECTION, SOLUTION INTRAMUSCULAR at 08:37

## 2025-03-10 RX ADMIN — SODIUM CHLORIDE 20 ML/HR: 0.9 INJECTION, SOLUTION INTRAVENOUS at 08:37

## 2025-03-10 RX ADMIN — IRON SUCROSE 300 MG: 20 INJECTION, SOLUTION INTRAVENOUS at 08:37

## 2025-03-10 NOTE — PROGRESS NOTES
Yaneth Waldron  tolerated treatment well with no complications.      Yaneth Waldron is aware of future appt on 3/17/25 at 1300.     AVS declined by Yaneth Waldron.

## 2025-03-17 ENCOUNTER — HOSPITAL ENCOUNTER (OUTPATIENT)
Dept: INFUSION CENTER | Facility: HOSPITAL | Age: 50
Discharge: HOME | End: 2025-03-17
Attending: INTERNAL MEDICINE
Payer: MEDICARE

## 2025-03-17 VITALS
DIASTOLIC BLOOD PRESSURE: 81 MMHG | HEART RATE: 91 BPM | RESPIRATION RATE: 18 BRPM | SYSTOLIC BLOOD PRESSURE: 123 MMHG | TEMPERATURE: 97 F

## 2025-03-17 DIAGNOSIS — D50.9 IRON DEFICIENCY ANEMIA, UNSPECIFIED IRON DEFICIENCY ANEMIA TYPE: ICD-10-CM

## 2025-03-17 DIAGNOSIS — L90.0 LICHEN SCLEROSUS: ICD-10-CM

## 2025-03-17 DIAGNOSIS — E53.8 B12 DEFICIENCY: Primary | ICD-10-CM

## 2025-03-17 PROCEDURE — 96365 THER/PROPH/DIAG IV INF INIT: CPT

## 2025-03-17 PROCEDURE — 96366 THER/PROPH/DIAG IV INF ADDON: CPT

## 2025-03-17 PROCEDURE — 96372 THER/PROPH/DIAG INJ SC/IM: CPT

## 2025-03-17 RX ORDER — SODIUM CHLORIDE 9 MG/ML
20 INJECTION, SOLUTION INTRAVENOUS ONCE
Status: COMPLETED | OUTPATIENT
Start: 2025-03-17 | End: 2025-03-17

## 2025-03-17 RX ORDER — CYANOCOBALAMIN 1000 UG/ML
1000 INJECTION, SOLUTION INTRAMUSCULAR; SUBCUTANEOUS ONCE
Status: COMPLETED | OUTPATIENT
Start: 2025-03-17 | End: 2025-03-17

## 2025-03-17 RX ORDER — CYANOCOBALAMIN 1000 UG/ML
1000 INJECTION, SOLUTION INTRAMUSCULAR; SUBCUTANEOUS ONCE
Status: CANCELLED | OUTPATIENT
Start: 2025-03-24 | End: 2025-03-24

## 2025-03-17 RX ORDER — SODIUM CHLORIDE 9 MG/ML
20 INJECTION, SOLUTION INTRAVENOUS ONCE
Status: CANCELLED | OUTPATIENT
Start: 2025-03-24

## 2025-03-17 RX ADMIN — CYANOCOBALAMIN 1000 MCG: 1000 INJECTION, SOLUTION INTRAMUSCULAR; SUBCUTANEOUS at 12:58

## 2025-03-17 RX ADMIN — SODIUM CHLORIDE 20 ML/HR: 0.9 INJECTION, SOLUTION INTRAVENOUS at 12:54

## 2025-03-17 RX ADMIN — IRON SUCROSE 300 MG: 20 INJECTION, SOLUTION INTRAVENOUS at 12:55

## 2025-03-17 NOTE — PROGRESS NOTES
Yaneth Waldron  tolerated treatment well with no complications.      Yaneth Waldron is aware of future appt on 3/24 at 11am.     AVS printed and given to Yaneth Waldron:    No (Declined by Yaneth Waldron)

## 2025-03-18 RX ORDER — TRIAMCINOLONE ACETONIDE 0.25 MG/G
CREAM TOPICAL 2 TIMES DAILY
Qty: 30 G | Refills: 1 | Status: SHIPPED | OUTPATIENT
Start: 2025-03-18

## 2025-03-24 ENCOUNTER — HOSPITAL ENCOUNTER (OUTPATIENT)
Dept: INFUSION CENTER | Facility: HOSPITAL | Age: 50
Discharge: HOME | End: 2025-03-24
Attending: INTERNAL MEDICINE
Payer: MEDICARE

## 2025-03-24 VITALS
HEART RATE: 87 BPM | TEMPERATURE: 96.6 F | SYSTOLIC BLOOD PRESSURE: 122 MMHG | DIASTOLIC BLOOD PRESSURE: 77 MMHG | RESPIRATION RATE: 18 BRPM

## 2025-03-24 DIAGNOSIS — D50.9 IRON DEFICIENCY ANEMIA, UNSPECIFIED IRON DEFICIENCY ANEMIA TYPE: ICD-10-CM

## 2025-03-24 DIAGNOSIS — E53.8 B12 DEFICIENCY: Primary | ICD-10-CM

## 2025-03-24 PROCEDURE — 96372 THER/PROPH/DIAG INJ SC/IM: CPT

## 2025-03-24 PROCEDURE — 96365 THER/PROPH/DIAG IV INF INIT: CPT

## 2025-03-24 RX ORDER — SODIUM CHLORIDE 9 MG/ML
20 INJECTION, SOLUTION INTRAVENOUS ONCE
Status: COMPLETED | OUTPATIENT
Start: 2025-03-24 | End: 2025-03-24

## 2025-03-24 RX ORDER — SODIUM CHLORIDE 9 MG/ML
20 INJECTION, SOLUTION INTRAVENOUS ONCE
OUTPATIENT
Start: 2025-03-31

## 2025-03-24 RX ORDER — CYANOCOBALAMIN 1000 UG/ML
1000 INJECTION, SOLUTION INTRAMUSCULAR; SUBCUTANEOUS ONCE
OUTPATIENT
Start: 2025-03-31 | End: 2025-03-31

## 2025-03-24 RX ORDER — CYANOCOBALAMIN 1000 UG/ML
1000 INJECTION, SOLUTION INTRAMUSCULAR; SUBCUTANEOUS ONCE
Status: COMPLETED | OUTPATIENT
Start: 2025-03-24 | End: 2025-03-24

## 2025-03-24 RX ADMIN — IRON SUCROSE 300 MG: 20 INJECTION, SOLUTION INTRAVENOUS at 10:55

## 2025-03-24 RX ADMIN — SODIUM CHLORIDE 20 ML/HR: 0.9 INJECTION, SOLUTION INTRAVENOUS at 10:54

## 2025-03-24 RX ADMIN — CYANOCOBALAMIN 1000 MCG: 1000 INJECTION, SOLUTION INTRAMUSCULAR; SUBCUTANEOUS at 10:55

## 2025-03-24 NOTE — PLAN OF CARE
Problem: Potential for Falls  Goal: Patient will remain free of falls  Description: INTERVENTIONS:- Educate patient/family on patient safety including physical limitations- Instruct patient to call for assistance with activity - Consult OT/PT to assist with strengthening/mobility - Keep Call bell within reach- Keep bed low and locked with side rails adjusted as appropriate- Keep care items and personal belongings within reach- Initiate and maintain comfort rounds- Make Fall Risk Sign visible to staff-  Apply yellow socks and bracelet for high fall risk patients- Consider moving patient to room near nurses station  Outcome: Progressing

## 2025-03-24 NOTE — PROGRESS NOTES
Yaneth Waldron  tolerated Venofer well with no complications.      Yaneth Waldron is aware of future appt on 3/31 at 1000.     AVS printed:  No (Declined by Yaneth Waldron)

## 2025-03-28 ENCOUNTER — RA CDI HCC (OUTPATIENT)
Dept: OTHER | Facility: HOSPITAL | Age: 50
End: 2025-03-28

## 2025-03-31 ENCOUNTER — HOSPITAL ENCOUNTER (OUTPATIENT)
Dept: INFUSION CENTER | Facility: HOSPITAL | Age: 50
Discharge: HOME | End: 2025-03-31
Attending: INTERNAL MEDICINE
Payer: MEDICARE

## 2025-03-31 VITALS
RESPIRATION RATE: 16 BRPM | DIASTOLIC BLOOD PRESSURE: 80 MMHG | SYSTOLIC BLOOD PRESSURE: 127 MMHG | TEMPERATURE: 97.4 F | HEART RATE: 80 BPM

## 2025-03-31 DIAGNOSIS — E53.8 B12 DEFICIENCY: Primary | ICD-10-CM

## 2025-03-31 DIAGNOSIS — D50.9 IRON DEFICIENCY ANEMIA, UNSPECIFIED IRON DEFICIENCY ANEMIA TYPE: ICD-10-CM

## 2025-03-31 PROCEDURE — 96366 THER/PROPH/DIAG IV INF ADDON: CPT

## 2025-03-31 PROCEDURE — 96372 THER/PROPH/DIAG INJ SC/IM: CPT

## 2025-03-31 PROCEDURE — 96365 THER/PROPH/DIAG IV INF INIT: CPT

## 2025-03-31 RX ORDER — CYANOCOBALAMIN 1000 UG/ML
1000 INJECTION, SOLUTION INTRAMUSCULAR; SUBCUTANEOUS ONCE
Status: CANCELLED | OUTPATIENT
Start: 2025-04-07 | End: 2025-04-07

## 2025-03-31 RX ORDER — SODIUM CHLORIDE 9 MG/ML
20 INJECTION, SOLUTION INTRAVENOUS ONCE
Status: CANCELLED | OUTPATIENT
Start: 2025-04-07

## 2025-03-31 RX ORDER — CYANOCOBALAMIN 1000 UG/ML
1000 INJECTION, SOLUTION INTRAMUSCULAR; SUBCUTANEOUS ONCE
Status: COMPLETED | OUTPATIENT
Start: 2025-03-31 | End: 2025-03-31

## 2025-03-31 RX ORDER — SODIUM CHLORIDE 9 MG/ML
20 INJECTION, SOLUTION INTRAVENOUS ONCE
Status: COMPLETED | OUTPATIENT
Start: 2025-03-31 | End: 2025-03-31

## 2025-03-31 RX ADMIN — CYANOCOBALAMIN 1000 MCG: 1000 INJECTION, SOLUTION INTRAMUSCULAR; SUBCUTANEOUS at 10:05

## 2025-03-31 RX ADMIN — SODIUM CHLORIDE 20 ML/HR: 0.9 INJECTION, SOLUTION INTRAVENOUS at 10:02

## 2025-03-31 RX ADMIN — IRON SUCROSE 300 MG: 20 INJECTION, SOLUTION INTRAVENOUS at 10:02

## 2025-03-31 NOTE — PROGRESS NOTES
Yaneth Waldron  tolerated treatment well with no complications.      Yaneth Waldron is aware that she has no further infusion appts at this time.     AVS printed and given to Yaneth Waldron:    No (Declined by Yaneth Waldron)

## 2025-04-09 ENCOUNTER — HOSPITAL ENCOUNTER (OUTPATIENT)
Dept: RADIOLOGY | Age: 50
Discharge: HOME | End: 2025-04-09
Payer: MEDICARE

## 2025-04-09 VITALS — BODY MASS INDEX: 40.97 KG/M2 | WEIGHT: 240 LBS | HEIGHT: 64 IN

## 2025-04-09 DIAGNOSIS — Z12.31 ENCOUNTER FOR SCREENING MAMMOGRAM FOR BREAST CANCER: ICD-10-CM

## 2025-04-09 PROCEDURE — 77063 BREAST TOMOSYNTHESIS BI: CPT

## 2025-04-09 PROCEDURE — 77067 SCR MAMMO BI INCL CAD: CPT

## 2025-04-16 ENCOUNTER — OFFICE VISIT (OUTPATIENT)
Dept: FAMILY MEDICINE CLINIC | Facility: CLINIC | Age: 50
End: 2025-04-16
Payer: MEDICARE

## 2025-04-16 VITALS
BODY MASS INDEX: 40.34 KG/M2 | OXYGEN SATURATION: 98 % | HEART RATE: 78 BPM | WEIGHT: 235 LBS | DIASTOLIC BLOOD PRESSURE: 76 MMHG | RESPIRATION RATE: 18 BRPM | TEMPERATURE: 98 F | SYSTOLIC BLOOD PRESSURE: 116 MMHG

## 2025-04-16 DIAGNOSIS — E66.813 CLASS 3 SEVERE OBESITY DUE TO EXCESS CALORIES WITHOUT SERIOUS COMORBIDITY WITH BODY MASS INDEX (BMI) OF 40.0 TO 44.9 IN ADULT: Primary | ICD-10-CM

## 2025-04-16 DIAGNOSIS — G47.33 OSA (OBSTRUCTIVE SLEEP APNEA): ICD-10-CM

## 2025-04-16 DIAGNOSIS — F31.77 BIPOLAR DISORDER, IN PARTIAL REMISSION, MOST RECENT EPISODE MIXED (HCC): ICD-10-CM

## 2025-04-16 DIAGNOSIS — D50.9 IRON DEFICIENCY ANEMIA, UNSPECIFIED IRON DEFICIENCY ANEMIA TYPE: ICD-10-CM

## 2025-04-16 DIAGNOSIS — R73.01 IFG (IMPAIRED FASTING GLUCOSE): ICD-10-CM

## 2025-04-16 DIAGNOSIS — Z00.00 ANNUAL WELLNESS VISIT: ICD-10-CM

## 2025-04-16 DIAGNOSIS — D06.9 CIN III (CERVICAL INTRAEPITHELIAL NEOPLASIA III): ICD-10-CM

## 2025-04-16 DIAGNOSIS — E78.00 ELEVATED LDL CHOLESTEROL LEVEL: Chronic | ICD-10-CM

## 2025-04-16 DIAGNOSIS — E53.8 B12 DEFICIENCY: ICD-10-CM

## 2025-04-16 PROCEDURE — 99214 OFFICE O/P EST MOD 30 MIN: CPT | Performed by: NURSE PRACTITIONER

## 2025-04-16 PROCEDURE — G0439 PPPS, SUBSEQ VISIT: HCPCS | Performed by: NURSE PRACTITIONER

## 2025-04-16 PROCEDURE — G2211 COMPLEX E/M VISIT ADD ON: HCPCS | Performed by: NURSE PRACTITIONER

## 2025-04-16 NOTE — PATIENT INSTRUCTIONS
Medicare Preventive Visit Patient Instructions  Thank you for completing your Welcome to Medicare Visit or Medicare Annual Wellness Visit today. Your next wellness visit will be due in one year (4/17/2026).  The screening/preventive services that you may require over the next 5-10 years are detailed below. Some tests may not apply to you based off risk factors and/or age. Screening tests ordered at today's visit but not completed yet may show as past due. Also, please note that scanned in results may not display below.  Preventive Screenings:  Service Recommendations Previous Testing/Comments   Colorectal Cancer Screening  * Colonoscopy    * Fecal Occult Blood Test (FOBT)/Fecal Immunochemical Test (FIT)  * Fecal DNA/Cologuard Test  * Flexible Sigmoidoscopy Age: 45-75 years old   Colonoscopy: every 10 years (may be performed more frequently if at higher risk)  OR  FOBT/FIT: every 1 year  OR  Cologuard: every 3 years  OR  Sigmoidoscopy: every 5 years  Screening may be recommended earlier than age 45 if at higher risk for colorectal cancer. Also, an individualized decision between you and your healthcare provider will decide whether screening between the ages of 76-85 would be appropriate. Colonoscopy: 01/27/2022  FOBT/FIT: Not on file  Cologuard: Not on file  Sigmoidoscopy: Not on file    Screening Current     Breast Cancer Screening Age: 40+ years old  Frequency: every 1-2 years  Not required if history of left and right mastectomy Mammogram: 04/09/2025    Screening Current   Cervical Cancer Screening Between the ages of 21-29, pap smear recommended once every 3 years.   Between the ages of 30-65, can perform pap smear with HPV co-testing every 5 years.   Recommendations may differ for women with a history of total hysterectomy, cervical cancer, or abnormal pap smears in past. Pap Smear: 12/17/2024    History Cervical Cancer   Hepatitis C Screening Once for adults born between 1945 and 1965  More frequently in  patients at high risk for Hepatitis C Hep C Antibody: 02/23/2024    Screening Current   Diabetes Screening 1-2 times per year if you're at risk for diabetes or have pre-diabetes Fasting glucose: 101 mg/dL (10/20/2024)  A1C: 5.5 % (10/20/2024)  Screening Current   Cholesterol Screening Once every 5 years if you don't have a lipid disorder. May order more often based on risk factors. Lipid panel: 10/20/2024    Screening Current     Other Preventive Screenings Covered by Medicare:  Abdominal Aortic Aneurysm (AAA) Screening: covered once if your at risk. You're considered to be at risk if you have a family history of AAA.  Lung Cancer Screening: covers low dose CT scan once per year if you meet all of the following conditions: (1) Age 55-77; (2) No signs or symptoms of lung cancer; (3) Current smoker or have quit smoking within the last 15 years; (4) You have a tobacco smoking history of at least 20 pack years (packs per day multiplied by number of years you smoked); (5) You get a written order from a healthcare provider.  Glaucoma Screening: covered annually if you're considered high risk: (1) You have diabetes OR (2) Family history of glaucoma OR (3)  aged 50 and older OR (4)  American aged 65 and older  Osteoporosis Screening: covered every 2 years if you meet one of the following conditions: (1) You're estrogen deficient and at risk for osteoporosis based off medical history and other findings; (2) Have a vertebral abnormality; (3) On glucocorticoid therapy for more than 3 months; (4) Have primary hyperparathyroidism; (5) On osteoporosis medications and need to assess response to drug therapy.   Last bone density test (DXA Scan): Not on file.  HIV Screening: covered annually if you're between the age of 15-65. Also covered annually if you are younger than 15 and older than 65 with risk factors for HIV infection. For pregnant patients, it is covered up to 3 times per  pregnancy.    Immunizations:  Immunization Recommendations   Influenza Vaccine Annual influenza vaccination during flu season is recommended for all persons aged >= 6 months who do not have contraindications   Pneumococcal Vaccine   * Pneumococcal conjugate vaccine = PCV13 (Prevnar 13), PCV15 (Vaxneuvance), PCV20 (Prevnar 20)  * Pneumococcal polysaccharide vaccine = PPSV23 (Pneumovax) Adults 19-63 yo with certain risk factors or if 65+ yo  If never received any pneumonia vaccine: recommend Prevnar 20 (PCV20)  Give PCV20 if previously received 1 dose of PCV13 or PPSV23   Hepatitis B Vaccine 3 dose series if at intermediate or high risk (ex: diabetes, end stage renal disease, liver disease)   Respiratory syncytial virus (RSV) Vaccine - COVERED BY MEDICARE PART D  * RSVPreF3 (Arexvy) CDC recommends that adults 60 years of age and older may receive a single dose of RSV vaccine using shared clinical decision-making (SCDM)   Tetanus (Td) Vaccine - COST NOT COVERED BY MEDICARE PART B Following completion of primary series, a booster dose should be given every 10 years to maintain immunity against tetanus. Td may also be given as tetanus wound prophylaxis.   Tdap Vaccine - COST NOT COVERED BY MEDICARE PART B Recommended at least once for all adults. For pregnant patients, recommended with each pregnancy.   Shingles Vaccine (Shingrix) - COST NOT COVERED BY MEDICARE PART B  2 shot series recommended in those 19 years and older who have or will have weakened immune systems or those 50 years and older     Health Maintenance Due:      Topic Date Due   • Breast Cancer Screening: Mammogram  04/09/2026   • Colorectal Cancer Screening  01/26/2027   • Cervical Cancer Screening  12/17/2029   • HIV Screening  Completed   • Hepatitis C Screening  Completed     Immunizations Due:      Topic Date Due   • COVID-19 Vaccine (4 - 2024-25 season) 09/01/2024     Advance Directives   What are advance directives?  Advance directives are legal  documents that state your wishes and plans for medical care. These plans are made ahead of time in case you lose your ability to make decisions for yourself. Advance directives can apply to any medical decision, such as the treatments you want, and if you want to donate organs.   What are the types of advance directives?  There are many types of advance directives, and each state has rules about how to use them. You may choose a combination of any of the following:  Living will:  This is a written record of the treatment you want. You can also choose which treatments you do not want, which to limit, and which to stop at a certain time. This includes surgery, medicine, IV fluid, and tube feedings.   Durable power of  for healthcare (DPAHC):  This is a written record that states who you want to make healthcare choices for you when you are unable to make them for yourself. This person, called a proxy, is usually a family member or a friend. You may choose more than 1 proxy.  Do not resuscitate (DNR) order:  A DNR order is used in case your heart stops beating or you stop breathing. It is a request not to have certain forms of treatment, such as CPR. A DNR order may be included in other types of advance directives.  Medical directive:  This covers the care that you want if you are in a coma, near death, or unable to make decisions for yourself. You can list the treatments you want for each condition. Treatment may include pain medicine, surgery, blood transfusions, dialysis, IV or tube feedings, and a ventilator (breathing machine).  Values history:  This document has questions about your views, beliefs, and how you feel and think about life. This information can help others choose the care that you would choose.  Why are advance directives important?  An advance directive helps you control your care. Although spoken wishes may be used, it is better to have your wishes written down. Spoken wishes can be  misunderstood, or not followed. Treatments may be given even if you do not want them. An advance directive may make it easier for your family to make difficult choices about your care.   Urinary Incontinence   Urinary incontinence (UI)  is when you lose control of your bladder. UI develops because your bladder cannot store or empty urine properly. The 3 most common types of UI are stress incontinence, urge incontinence, or both.  Medicines:   May be given to help strengthen your bladder control. Report any side effects of medication to your healthcare provider.  Do pelvic muscle exercises often:  Your pelvic muscles help you stop urinating. Squeeze these muscles tight for 5 seconds, then relax for 5 seconds. Gradually work up to squeezing for 10 seconds. Do 3 sets of 15 repetitions a day, or as directed. This will help strengthen your pelvic muscles and improve bladder control.  Train your bladder:  Go to the bathroom at set times, such as every 2 hours, even if you do not feel the urge to go. You can also try to hold your urine when you feel the urge to go. For example, hold your urine for 5 minutes when you feel the urge to go. As that becomes easier, hold your urine for 10 minutes.   Self-care:   Keep a UI record.  Write down how often you leak urine and how much you leak. Make a note of what you were doing when you leaked urine.  Drink liquids as directed. You may need to limit the amount of liquid you drink to help control your urine leakage. Do not drink any liquid right before you go to bed. Limit or do not have drinks that contain caffeine or alcohol.   Prevent constipation.  Eat a variety of high-fiber foods. Good examples are high-fiber cereals, beans, vegetables, and whole-grain breads. Walking is the best way to trigger your intestines to have a bowel movement.  Exercise regularly and maintain a healthy weight.  Weight loss and exercise will decrease pressure on your bladder and help you control your  leakage.   Use a catheter as directed  to help empty your bladder. A catheter is a tiny, plastic tube that is put into your bladder to drain your urine.   Go to behavior therapy as directed.  Behavior therapy may be used to help you learn to control your urge to urinate.    Weight Management   Why it is important to manage your weight:  Being overweight increases your risk of health conditions such as heart disease, high blood pressure, type 2 diabetes, and certain types of cancer. It can also increase your risk for osteoarthritis, sleep apnea, and other respiratory problems. Aim for a slow, steady weight loss. Even a small amount of weight loss can lower your risk of health problems.  How to lose weight safely:  A safe and healthy way to lose weight is to eat fewer calories and get regular exercise. You can lose up about 1 pound a week by decreasing the number of calories you eat by 500 calories each day.   Healthy meal plan for weight management:  A healthy meal plan includes a variety of foods, contains fewer calories, and helps you stay healthy. A healthy meal plan includes the following:  Eat whole-grain foods more often.  A healthy meal plan should contain fiber. Fiber is the part of grains, fruits, and vegetables that is not broken down by your body. Whole-grain foods are healthy and provide extra fiber in your diet. Some examples of whole-grain foods are whole-wheat breads and pastas, oatmeal, brown rice, and bulgur.  Eat a variety of vegetables every day.  Include dark, leafy greens such as spinach, kale, brina greens, and mustard greens. Eat yellow and orange vegetables such as carrots, sweet potatoes, and winter squash.   Eat a variety of fruits every day.  Choose fresh or canned fruit (canned in its own juice or light syrup) instead of juice. Fruit juice has very little or no fiber.  Eat low-fat dairy foods.  Drink fat-free (skim) milk or 1% milk. Eat fat-free yogurt and low-fat cottage cheese. Try  low-fat cheeses such as mozzarella and other reduced-fat cheeses.  Choose meat and other protein foods that are low in fat.  Choose beans or other legumes such as split peas or lentils. Choose fish, skinless poultry (chicken or turkey), or lean cuts of red meat (beef or pork). Before you cook meat or poultry, cut off any visible fat.   Use less fat and oil.  Try baking foods instead of frying them. Add less fat, such as margarine, sour cream, regular salad dressing and mayonnaise to foods. Eat fewer high-fat foods. Some examples of high-fat foods include french fries, doughnuts, ice cream, and cakes.  Eat fewer sweets.  Limit foods and drinks that are high in sugar. This includes candy, cookies, regular soda, and sweetened drinks.  Exercise:  Exercise at least 30 minutes per day on most days of the week. Some examples of exercise include walking, biking, dancing, and swimming. You can also fit in more physical activity by taking the stairs instead of the elevator or parking farther away from stores. Ask your healthcare provider about the best exercise plan for you.      © Copyright [employer] 2018 Information is for End User's use only and may not be sold, redistributed or otherwise used for commercial purposes. All illustrations and images included in CareNotes® are the copyrighted property of [employer] or [employer]

## 2025-04-16 NOTE — ASSESSMENT & PLAN NOTE
Updated A1C and FBS ordered today  Follow a low carb/ low sugar diet  Work on regular exercise     Orders:    Comprehensive metabolic panel; Future    Hemoglobin A1C; Future

## 2025-04-16 NOTE — ASSESSMENT & PLAN NOTE
Stable and being managed by Hematology   Last iron infusion 3/2025  She has lab work to be completed prior to her Hematology follow up in [place]

## 2025-04-16 NOTE — ASSESSMENT & PLAN NOTE
Yaneth is currently on a waiting list for CBT with [facility]  I did encourage her to reach out to her insurance for other offices in the area that might be taking new patients   She declined any medications at this time as she feels her mood has been quite stable    Orders:    Comprehensive metabolic panel; Future    TSH, 3rd generation with Free T4 reflex; Future

## 2025-04-16 NOTE — ASSESSMENT & PLAN NOTE
Encouraged lifestyle modifications  No interest in seeing Weight Management at this time    Orders:    Comprehensive metabolic panel; Future    Hemoglobin A1C; Future    Lipid panel; Future    TSH, 3rd generation with Free T4 reflex; Future

## 2025-04-16 NOTE — PROGRESS NOTES
Name: Yaneth Waldron      : 1975      MRN: [id number]  Encounter Provider: RIAZ Yi  Encounter Date: 2025   Encounter department: [facility]  :  Assessment & Plan  Annual wellness visit         Class 3 severe obesity due to excess calories without serious comorbidity with body mass index (BMI) of 40.0 to 44.9 in adult (HCC)  Encouraged lifestyle modifications  No interest in seeing Weight Management at this time    Orders:    Comprehensive metabolic panel; Future    Hemoglobin A1C; Future    Lipid panel; Future    TSH, 3rd generation with Free T4 reflex; Future    Bipolar disorder, in partial remission, most recent episode mixed (HCC)  Yaneth is currently on a waiting list for CBT with [facility]  I did encourage her to reach out to her insurance for other offices in the area that might be taking new patients   She declined any medications at this time as she feels her mood has been quite stable    Orders:    Comprehensive metabolic panel; Future    TSH, 3rd generation with Free T4 reflex; Future    Elevated LDL cholesterol level  Updated lipid panel ordered  Current ASCVD risk at 1.0%, no current statin therapy     Orders:    Lipid panel; Future    TSH, 3rd generation with Free T4 reflex; Future    JOHN (obstructive sleep apnea)  Home Sleep study completed 10/2024 with mild JOHN  Upcoming Sleep Medicine evaluation for CPAP initiation in 2025         LIZETH III (cervical intraepithelial neoplasia III)  Stable and managed by Gynecology  Last PAP 2024  She is s/p cold knife cone and vulvar bx 2022 (margins negative)         IFG (impaired fasting glucose)  Updated A1C and FBS ordered today  Follow a low carb/ low sugar diet  Work on regular exercise     Orders:    Comprehensive metabolic panel; Future    Hemoglobin A1C; Future    Iron deficiency anemia, unspecified iron deficiency anemia type  Stable and being managed by Hematology   Last iron infusion  3/2025  She has lab work to be completed prior to her Hematology follow up in June            B12 deficiency  Stable and managed by Hematology            Preventive health issues were discussed with patient, and age appropriate screening tests were ordered as noted in patient's After Visit Summary. Personalized health advice and appropriate referrals for health education or preventive services given if needed, as noted in patient's After Visit Summary.    History of Present Illness     HPI     Pt presents by herself today for a routine follow up and AWV  Doing well overall, no acute concerns today     Following with Hematology for WILLY and Vitamin B12 deficiency, received Venofer infusion 3/31/25 and Vitamin B12 injection    Upcoming evaluation with Sleep Medicine for JOHN, last home sleep study 10/4/24 which demonstrated mild JOHN, recommending initiation of CPAP. Pt notes her Sleep Med follow up with rescheduled until this June     Colonoscopy last 1/27/2022, recall due 1/2027  Mammogram completed 4/9/2025     Pt follows with Gynecology for LIZETH III treated with cervical LEEP excision.  Last PAP 12/17/2024     Pt notes a diagnosis of bipolar disorder- notes she has seen a therapist in the past and is currently on a waiting list to begin therapy again.  No current medications and she denies wanting to start anything now.  She does note a good support system      Patient Care Team:  RIAZ Yi as PCP - General (Family Medicine)    Review of Systems   Constitutional:  Negative for activity change, appetite change, chills, diaphoresis, fatigue, fever and unexpected weight change.   Eyes:  Negative for visual disturbance.   Respiratory:  Negative for cough, chest tightness, shortness of breath and wheezing.    Cardiovascular:  Negative for chest pain, palpitations and leg swelling.   Gastrointestinal:  Negative for abdominal pain, blood in stool, constipation, diarrhea and nausea.   Genitourinary:  Negative  for dysuria.   Musculoskeletal:  Negative for arthralgias and myalgias.   Skin:  Negative for rash and wound.   Neurological:  Negative for dizziness, weakness, numbness and headaches.   Psychiatric/Behavioral:  Negative for dysphoric mood, self-injury, sleep disturbance and suicidal ideas. The patient is not nervous/anxious.      Medical History Reviewed by provider this encounter:       Annual Wellness Visit Questionnaire   Last Medicare Wellness visit information reviewed, patient interviewed and updates made to the record today.      Health Risk Assessment:   Patient rates overall health as fair. Patient feels that their physical health rating is slightly better. Patient is satisfied with their life. Eyesight was rated as same. Hearing was rated as same. Patient feels that their emotional and mental health rating is same. Patients states they are never, rarely angry. Patient states they are sometimes unusually tired/fatigued. Pain experienced in the last 7 days has been none. Patient states that she has experienced no weight loss or gain in last 6 months.     Depression Screening:   PHQ-9 Score: 1      Fall Risk Screening:   In the past year, patient has experienced: no history of falling in past year      Urinary Incontinence Screening:   Patient has leaked urine accidently in the last six months.     Home Safety:  Patient does not have trouble with stairs inside or outside of their home. Patient has working smoke alarms and has working carbon monoxide detector. Home safety hazards include: none.     Nutrition:   Current diet is Regular.     Medications:   Patient is not currently taking any over-the-counter supplements. Patient is able to manage medications.     Activities of Daily Living (ADLs)/Instrumental Activities of Daily Living (IADLs):   Walk and transfer into and out of bed and chair?: Yes  Dress and groom yourself?: Yes    Bathe or shower yourself?: Yes    Feed yourself? Yes  Do your  laundry/housekeeping?: Yes  Manage your money, pay your bills and track your expenses?: Yes  Make your own meals?: Yes    Do your own shopping?: Yes    Previous Hospitalizations:   Any hospitalizations or ED visits within the last 12 months?: No      Advance Care Planning:   Living will: No    Durable POA for healthcare: No    Advanced directive: No    ACP document given: Yes      Cognitive Screening:   Provider or family/friend/caregiver concerned regarding cognition?: No    Preventive Screenings      Cardiovascular Screening:    General: Screening Current and Risks and Benefits Discussed      Diabetes Screening:     General: Screening Current and Risks and Benefits Discussed      Colorectal Cancer Screening:     General: Screening Current      Breast Cancer Screening:     General: Screening Current and Risks and Benefits Discussed      Cervical Cancer Screening:    General: History Cervical Cancer and Screening Current      Osteoporosis Screening:    General: Risks and Benefits Discussed and Screening Not Indicated      Abdominal Aortic Aneurysm (AAA) Screening:        General: Screening Not Indicated and Risks and Benefits Discussed      Lung Cancer Screening:     General: Screening Not Indicated and Risks and Benefits Discussed      Hepatitis C Screening:    General: Screening Current    Screening, Brief Intervention, and Referral to Treatment (SBIRT)     Screening  Typical number of drinks in a day: 0  Typical number of drinks in a week: 1  Interpretation: Low risk drinking behavior.    AUDIT-C Screenin) How often did you have a drink containing alcohol in the past year? monthly or less  2) How many drinks did you have on a typical day when you were drinking in the past year? 1 to 2  3) How often did you have 6 or more drinks on one occasion in the past year? never    AUDIT-C Score: 1  Interpretation: Score 0-2 (female): Negative screen for alcohol misuse    Single Item Drug Screening:  How often have you  "used an illegal drug (including marijuana) or a prescription medication for non-medical reasons in the past year? less than monthly    Single Item Drug Screen Score: 1  Interpretation: POSITIVE screen for possible drug use disorder    Drug Abuse Screening Test (DAST-10):  1) Have you used drugs other than those required for medical reasons? Yes  2) Do you abuse more than one drug at a time? No  3) Are you always able to stop using drugs when you want to? Yes  4) Have you had \"blackouts\" or \"flashbacks\" as a result of drug use? No  5) Do you ever feel bad or guilty about your drug use? No  6) Does your spouse (or parents) ever complain about your involvement with drugs? No  7) Have you neglected your family because of your use of drugs? No  8) Have you engaged in illegal activities in order to obtain drugs? No  9) Have you ever experienced withdrawal symptoms (felt sick) when you stopped taking drugs? No  10) Have you had medical problems as a result of your drug use (e.g., memory loss, hepatitis, convulsions, bleeding, etc.)? No    DAST-10 Score: 1  Interpretation: Low level problems related to drug abuse    SDOH Risk Assessment  Social determinants of health (SDOH) risk assesment tool was completed. The tool at a minimum covered housing stability, food insecurity, transportation needs, and utility difficulty. Patient had at risk responses for the following SDOH domains: food insecurity, housing stability and utilities.     Social Drivers of Health     Financial Resource Strain: High Risk (2/19/2024)    Overall Financial Resource Strain (CARDIA)     Difficulty of Paying Living Expenses: Hard   Food Insecurity: Food Insecurity Present (4/11/2025)    Hunger Vital Sign     Worried About Running Out of Food in the Last Year: Often true     Ran Out of Food in the Last Year: Often true   Transportation Needs: No Transportation Needs (4/11/2025)    PRAPARE - Transportation     Lack of Transportation (Medical): No     Lack " of Transportation (Non-Medical): No   Housing Stability: High Risk (4/11/2025)    Housing Stability Vital Sign     Unable to Pay for Housing in the Last Year: Yes     Number of Times Moved in the Last Year: 0     Homeless in the Last Year: No   Utilities: At Risk (4/11/2025)    [facility] Utilities     Threatened with loss of utilities: Yes     No results found.    Objective   /76 (BP Location: Left arm, Patient Position: Sitting, Cuff Size: Standard)   Pulse 78   Temp 98 °F (36.7 °C)   Resp 18   Wt 107 kg (235 lb)   LMP 03/09/2025 (Approximate)   SpO2 98%   BMI 40.34 kg/m²     Physical Exam  Constitutional:       General: She is not in acute distress.     Appearance: She is well-developed. She is obese. She is not ill-appearing, toxic-appearing or diaphoretic.   HENT:      Head: Normocephalic and atraumatic.   Eyes:      Extraocular Movements: Extraocular movements intact.      Conjunctiva/sclera: Conjunctivae normal.      Pupils: Pupils are equal, round, and reactive to light.   Neck:      Thyroid: No thyromegaly.   Cardiovascular:      Rate and Rhythm: Normal rate and regular rhythm.      Heart sounds: Normal heart sounds. No murmur heard.  Pulmonary:      Effort: Pulmonary effort is normal. No respiratory distress.      Breath sounds: Normal breath sounds. No wheezing.   Abdominal:      General: Bowel sounds are normal. There is no distension.      Palpations: Abdomen is soft.      Tenderness: There is no abdominal tenderness.   Musculoskeletal:         General: Normal range of motion.      Cervical back: Normal range of motion and neck supple. No rigidity or tenderness.      Right lower leg: No edema.      Left lower leg: No edema.   Lymphadenopathy:      Cervical: No cervical adenopathy.   Skin:     General: Skin is warm and dry.   Neurological:      General: No focal deficit present.      Mental Status: She is alert and oriented to person, place, and time.   Psychiatric:         Mood and Affect: Mood  normal.         Behavior: Behavior normal.         Thought Content: Thought content normal.         Judgment: Judgment normal.

## 2025-04-16 NOTE — ASSESSMENT & PLAN NOTE
Stable and managed by Gynecology  Last PAP 12/2024  She is s/p cold knife cone and vulvar bx 2/2022 (margins negative)

## 2025-04-16 NOTE — ASSESSMENT & PLAN NOTE
Home Sleep study completed 10/2024 with mild JOHN  Upcoming Sleep Medicine evaluation for CPAP initiation in June 2025

## 2025-07-23 ENCOUNTER — OFFICE VISIT (OUTPATIENT)
Dept: OBGYN CLINIC | Facility: CLINIC | Age: 50
End: 2025-07-23
Payer: MEDICARE

## 2025-07-23 VITALS — DIASTOLIC BLOOD PRESSURE: 80 MMHG | SYSTOLIC BLOOD PRESSURE: 110 MMHG | WEIGHT: 232.4 LBS | BODY MASS INDEX: 39.89 KG/M2

## 2025-07-23 DIAGNOSIS — L90.0 LICHEN SCLEROSUS: Primary | ICD-10-CM

## 2025-07-23 PROCEDURE — 99213 OFFICE O/P EST LOW 20 MIN: CPT | Performed by: OBSTETRICS & GYNECOLOGY

## 2025-07-23 NOTE — PATIENT INSTRUCTIONS
Topic: Lichen sclerosus    Physical examination revealed lichen sclerosus to be stable.  Present on both the right and left vulvar and vaginal introital areas.    Suggested patient wear loose clothing is much as possible and utilize the Kenalog and warm teabag soaks as needed.  She reported that the soaks are wet provide the most relief for her symptoms.    We will see her back in 6 months for her annual gynecologic and medical exam    All questions answered for her    Patient to call for any problems, questions, issues or concerns which may arise for her

## 2025-07-23 NOTE — PROGRESS NOTES
:  Assessment & Plan  Lichen sclerosus             History of Present Illness     Yaneth Waldron is a 50 y.o. female   Patient is a 50-year-old female who presents today for follow-up of lichen sclerosis.    Patient reports that she is not experiencing any abnormal vaginal bleeding    She reports that the lichen sclerosus has flared up during the summer with more itching noted.    She is utilize a Kenalog cream as well as the warm teabag soaks.    She reports that the teabag regimen has provided the most symptomatic relief for her.    I also suggested that she utilize loosefitting clothing and underwear in order to achieve even more symptomatic relief as well.    All questions were answered in detail for her during today's visit    I will see her back in 6 months for her annual gynecologic and medical exam    Patient to call for any problems, questions, issues or concerns which may arise for her.      Total time of today's visit was 20 minutes of which greater than 50% was spent face-to-face counseling the patient as well as coordination of care, review of chart and lab values, physical examination as well as computer entry into the [facility] medical record system.            Review of Systems   Constitutional: Negative.  Negative for appetite change, diaphoresis, fatigue, fever and unexpected weight change.   HENT: Negative.     Eyes: Negative.    Respiratory: Negative.     Cardiovascular: Negative.    Gastrointestinal: Negative.  Negative for abdominal pain, blood in stool, constipation, diarrhea, nausea and vomiting.   Endocrine: Negative.  Negative for cold intolerance and heat intolerance.   Genitourinary: Negative.  Negative for dysuria, frequency, hematuria, urgency, vaginal bleeding, vaginal discharge and vaginal pain.   Musculoskeletal: Negative.    Skin: Negative.    Allergic/Immunologic: Negative.    Neurological: Negative.         History of migraines   Hematological: Negative.  Negative for adenopathy.    Psychiatric/Behavioral: Negative.       Objective   /80   Wt 105 kg (232 lb 6.4 oz)   BMI 39.89 kg/m²      Physical Exam  Constitutional:       Appearance: Normal appearance.   HENT:      Head: Normocephalic and atraumatic.     Eyes:      Pupils: Pupils are equal, round, and reactive to light.       Cardiovascular:      Rate and Rhythm: Normal rate.   Pulmonary:      Effort: Pulmonary effort is normal.   Genitourinary:     Labia:         Right: Lesion present. No rash, tenderness or injury.         Left: Lesion present. No rash, tenderness or injury.       Urethra: No urethral swelling or urethral lesion.      Comments: Will bilateral lichen sclerosus remains stable.  Patient encouraged to utilize Kenalog cream as well as warm teabag soaks as needed for symptomatic relief.  Also encouraged to use loosefitting clothing as well    Musculoskeletal:         General: Normal range of motion.      Cervical back: Normal range of motion and neck supple.     Skin:     General: Skin is warm and dry.     Neurological:      General: No focal deficit present.      Mental Status: She is alert and oriented to person, place, and time.     Psychiatric:         Mood and Affect: Mood normal.         Behavior: Behavior normal.         Thought Content: Thought content normal.         Judgment: Judgment normal.

## (undated) DEVICE — ELECTRODE BALL E-Z CLEAN 2IN -0015

## (undated) DEVICE — MEDI-VAC YANK SUCT HNDL W/TPRD BULBOUS TIP: Brand: CARDINAL HEALTH

## (undated) DEVICE — GLOVE SRG BIOGEL 6.5

## (undated) DEVICE — NEEDLE SPINAL18G X 3.5 IN QUINCKE

## (undated) DEVICE — PREMIUM DRY TRAY LF: Brand: MEDLINE INDUSTRIES, INC.

## (undated) DEVICE — SYRINGE 10ML LL CONTROL TOP

## (undated) DEVICE — CHLORHEXIDINE 4PCT 4 OZ

## (undated) DEVICE — GLOVE INDICATOR PI UNDERGLOVE SZ 6.5 BLUE

## (undated) DEVICE — 1820 FOAM BLOCK NEEDLE COUNTER: Brand: DEVON

## (undated) DEVICE — VIAL DECANTER

## (undated) DEVICE — PENCIL ELECTROSURG E-Z CLEAN -0035H

## (undated) DEVICE — STRL ALLENTOWN HYSTEROSCOPY PK: Brand: CARDINAL HEALTH

## (undated) DEVICE — SPONGE LAP 18 X 18 IN

## (undated) DEVICE — INTENDED FOR TISSUE SEPARATION, AND OTHER PROCEDURES THAT REQUIRE A SHARP SURGICAL BLADE TO PUNCTURE OR CUT.: Brand: BARD-PARKER ® CARBON RIB-BACK BLADES

## (undated) DEVICE — SURGICEL FIBRILLAR 1 X 2

## (undated) DEVICE — LIGHT HANDLE COVER SLEEVE DISP BLUE STELLAR

## (undated) DEVICE — SUT VICRYL 0 CT-1 36 IN J946H

## (undated) DEVICE — STERILE 8 INCH PROCTO SWAB: Brand: CARDINAL HEALTH